# Patient Record
Sex: MALE | Race: BLACK OR AFRICAN AMERICAN | NOT HISPANIC OR LATINO | Employment: UNEMPLOYED | URBAN - METROPOLITAN AREA
[De-identification: names, ages, dates, MRNs, and addresses within clinical notes are randomized per-mention and may not be internally consistent; named-entity substitution may affect disease eponyms.]

---

## 2017-05-25 ENCOUNTER — HISTORICAL (OUTPATIENT)
Dept: ADMINISTRATIVE | Facility: HOSPITAL | Age: 47
End: 2017-05-25

## 2017-05-25 LAB
ABS NEUT (OLG): 1.77 X10(3)/MCL (ref 2.1–9.2)
ALBUMIN SERPL-MCNC: 4 GM/DL (ref 3.4–5)
ALBUMIN/GLOB SERPL: 1 RATIO (ref 1–2)
ALP SERPL-CCNC: 62 UNIT/L (ref 20–120)
ALT SERPL-CCNC: 43 UNIT/L
AST SERPL-CCNC: 37 UNIT/L
BASOPHILS # BLD AUTO: 0.01 X10(3)/MCL
BASOPHILS NFR BLD AUTO: 0 % (ref 0–1)
BILIRUB SERPL-MCNC: 0.3 MG/DL
BILIRUBIN DIRECT+TOT PNL SERPL-MCNC: <0.1 MG/DL
BILIRUBIN DIRECT+TOT PNL SERPL-MCNC: >0.2 MG/DL
BUN SERPL-MCNC: 11 MG/DL (ref 7–25)
CALCIUM SERPL-MCNC: 9.1 MG/DL (ref 8.4–10.3)
CD3+CD4+ CELLS # SPEC: 940 UNIT/L (ref 589–1505)
CD3+CD4+ CELLS NFR BLD: 45.4 % (ref 31–59)
CHLORIDE SERPL-SCNC: 104 MMOL/L (ref 96–110)
CHOLEST SERPL-MCNC: 197 MG/DL
CHOLEST/HDLC SERPL: 2.6 {RATIO} (ref 0–5)
CO2 SERPL-SCNC: 28 MMOL/L (ref 24–32)
CREAT SERPL-MCNC: 0.73 MG/DL (ref 0.7–1.4)
EOSINOPHIL # BLD AUTO: 0.11 10*3/UL
EOSINOPHIL NFR BLD AUTO: 2 % (ref 0–5)
ERYTHROCYTE [DISTWIDTH] IN BLOOD BY AUTOMATED COUNT: 11.9 % (ref 11.5–14.5)
GLOBULIN SER-MCNC: 4.4 GM/ML (ref 2.3–3.5)
GLUCOSE SERPL-MCNC: 95 MG/DL (ref 65–99)
HCT VFR BLD AUTO: 43 % (ref 40–51)
HDLC SERPL-MCNC: 76 MG/DL
HGB BLD-MCNC: 15 GM/DL (ref 13.5–17.5)
IMM GRANULOCYTES # BLD AUTO: 0.01 10*3/UL
IMM GRANULOCYTES NFR BLD AUTO: 0 %
LDLC SERPL CALC-MCNC: 107 MG/DL (ref 0–130)
LYMPHOCYTES # BLD AUTO: 2.04 X10(3)/MCL
LYMPHOCYTES # BLD AUTO: 2070 UNIT/L (ref 1260–5520)
LYMPHOCYTES NFR BLD AUTO: 46 % (ref 15–40)
LYMPHOCYTES NFR LN MANUAL: 46 % (ref 28–48)
LYMPHOMA - T-CELL MARKERS SPEC-IMP: NORMAL
MCH RBC QN AUTO: 36.7 PG (ref 26–34)
MCHC RBC AUTO-ENTMCNC: 34.9 GM/DL (ref 31–37)
MCV RBC AUTO: 105.1 FL (ref 80–100)
MONOCYTES # BLD AUTO: 0.52 X10(3)/MCL
MONOCYTES NFR BLD AUTO: 12 % (ref 4–12)
NEUTROPHILS # BLD AUTO: 1.77 X10(3)/MCL
NEUTROPHILS NFR BLD AUTO: 40 X10(3)/MCL
PLATELET # BLD AUTO: 186 X10(3)/MCL (ref 130–400)
PMV BLD AUTO: 10.4 FL (ref 7.4–10.4)
POTASSIUM SERPL-SCNC: 4.3 MMOL/L (ref 3.6–5.2)
PROT SERPL-MCNC: 8.4 GM/DL (ref 6–8)
RBC # BLD AUTO: 4.09 X10(6)/MCL (ref 4.5–5.9)
RPR SER QL: REACTIVE
SODIUM SERPL-SCNC: 136 MMOL/L (ref 135–146)
TRIGL SERPL-MCNC: 70 MG/DL
VLDLC SERPL CALC-MCNC: 14 MG/DL
WBC # BLD AUTO: 4500 /MM3 (ref 4500–11500)
WBC # SPEC AUTO: 4.5 X10(3)/MCL (ref 4.5–11)

## 2017-08-22 ENCOUNTER — HISTORICAL (OUTPATIENT)
Dept: ADMINISTRATIVE | Facility: HOSPITAL | Age: 47
End: 2017-08-22

## 2018-02-01 ENCOUNTER — HISTORICAL (OUTPATIENT)
Dept: INTERNAL MEDICINE | Facility: CLINIC | Age: 48
End: 2018-02-01

## 2018-02-01 LAB
ABS NEUT (OLG): 1.25 X10(3)/MCL (ref 2.1–9.2)
ALBUMIN SERPL-MCNC: 3.4 GM/DL (ref 3.4–5)
ALBUMIN/GLOB SERPL: 1 RATIO (ref 1–2)
ALP SERPL-CCNC: 60 UNIT/L (ref 45–117)
ALT SERPL-CCNC: 29 UNIT/L (ref 12–78)
AMPHET UR QL SCN: NEGATIVE
AST SERPL-CCNC: 24 UNIT/L (ref 15–37)
BARBITURATE SCN PRESENT UR: NEGATIVE
BASOPHILS # BLD AUTO: 0.03 X10(3)/MCL
BASOPHILS NFR BLD AUTO: 1 % (ref 0–1)
BENZODIAZ UR QL SCN: NEGATIVE
BILIRUB SERPL-MCNC: 0.4 MG/DL (ref 0.2–1)
BILIRUBIN DIRECT+TOT PNL SERPL-MCNC: 0.1 MG/DL
BILIRUBIN DIRECT+TOT PNL SERPL-MCNC: 0.3 MG/DL
BUN SERPL-MCNC: 12 MG/DL (ref 7–18)
CALCIUM SERPL-MCNC: 8.4 MG/DL (ref 8.5–10.1)
CANNABINOIDS UR QL SCN: NEGATIVE
CHLORIDE SERPL-SCNC: 109 MMOL/L (ref 98–107)
CO2 SERPL-SCNC: 22 MMOL/L (ref 21–32)
COCAINE UR QL SCN: NEGATIVE
CREAT SERPL-MCNC: 0.8 MG/DL (ref 0.6–1.3)
EOSINOPHIL # BLD AUTO: 0.17 X10(3)/MCL
EOSINOPHIL NFR BLD AUTO: 5 % (ref 0–5)
ERYTHROCYTE [DISTWIDTH] IN BLOOD BY AUTOMATED COUNT: 11.1 % (ref 11.5–14.5)
ETHANOL SERPL-MCNC: <3 MG/DL
FERRITIN SERPL-MCNC: 185.2 NG/ML (ref 26–388)
GLOBULIN SER-MCNC: 4.8 GM/ML (ref 2.3–3.5)
GLUCOSE SERPL-MCNC: 91 MG/DL (ref 74–106)
HAV AB SER QL IA: REACTIVE
HBV CORE AB SERPL QL IA: NONREACTIVE
HBV SURFACE AB SER-ACNC: 26.56 MIU/ML
HBV SURFACE AB SERPL IA-ACNC: REACTIVE M[IU]/ML
HBV SURFACE AG SERPL QL IA: NEGATIVE
HCT VFR BLD AUTO: 42.5 % (ref 40–51)
HGB BLD-MCNC: 15.1 GM/DL (ref 13.5–17.5)
HIV 1+2 AB+HIV1 P24 AG SERPL QL IA: REACTIVE
INR PPP: 0.99 (ref 0.9–1.2)
IRON SATN MFR SERPL: 37.9 % (ref 15–50)
IRON SERPL-MCNC: 111 MCG/DL (ref 65–175)
LYMPHOCYTES # BLD AUTO: 1.73 X10(3)/MCL
LYMPHOCYTES NFR BLD AUTO: 48 % (ref 15–40)
MCH RBC QN AUTO: 36 PG (ref 26–34)
MCHC RBC AUTO-ENTMCNC: 35.5 GM/DL (ref 31–37)
MCV RBC AUTO: 101.4 FL (ref 80–100)
MONOCYTES # BLD AUTO: 0.46 X10(3)/MCL
MONOCYTES NFR BLD AUTO: 13 % (ref 4–12)
NEUTROPHILS # BLD AUTO: 1.25 X10(3)/MCL
NEUTROPHILS NFR BLD AUTO: 34 X10(3)/MCL
OPIATES UR QL SCN: NEGATIVE
PCP UR QL: NEGATIVE
PH UR STRIP.AUTO: 6 [PH] (ref 5–8)
PLATELET # BLD AUTO: 203 X10(3)/MCL (ref 130–400)
PMV BLD AUTO: 10.6 FL (ref 7.4–10.4)
POTASSIUM SERPL-SCNC: 3.9 MMOL/L (ref 3.5–5.1)
PROT SERPL-MCNC: 8.2 GM/DL (ref 6.4–8.2)
PROTHROMBIN TIME: 12.4 SECOND(S) (ref 11.9–14.4)
RBC # BLD AUTO: 4.19 X10(6)/MCL (ref 4.5–5.9)
SODIUM SERPL-SCNC: 138 MMOL/L (ref 136–145)
TEMPERATURE, URINE (OHS): 24.1 DEGC (ref 20–25)
TIBC SERPL-MCNC: 293 MCG/DL (ref 250–450)
TRANSFERRIN SERPL-MCNC: 245 MG/DL (ref 200–360)
WBC # SPEC AUTO: 3.6 X10(3)/MCL (ref 4.5–11)

## 2018-05-17 ENCOUNTER — HISTORICAL (OUTPATIENT)
Dept: ADMINISTRATIVE | Facility: HOSPITAL | Age: 48
End: 2018-05-17

## 2018-05-17 LAB
ABS NEUT (OLG): 1.59 X10(3)/MCL (ref 2.1–9.2)
ALBUMIN SERPL-MCNC: 3.6 GM/DL (ref 3.4–5)
ALBUMIN/GLOB SERPL: 1 RATIO (ref 1–2)
ALP SERPL-CCNC: 63 UNIT/L (ref 45–117)
ALT SERPL-CCNC: 46 UNIT/L (ref 12–78)
AST SERPL-CCNC: 31 UNIT/L (ref 15–37)
BASOPHILS # BLD AUTO: 0.02 X10(3)/MCL
BASOPHILS NFR BLD AUTO: 0 %
BILIRUB SERPL-MCNC: 0.4 MG/DL (ref 0.2–1)
BILIRUBIN DIRECT+TOT PNL SERPL-MCNC: 0.1 MG/DL
BILIRUBIN DIRECT+TOT PNL SERPL-MCNC: 0.3 MG/DL
BUN SERPL-MCNC: 10 MG/DL (ref 7–18)
CALCIUM SERPL-MCNC: 8.6 MG/DL (ref 8.5–10.1)
CD3+CD4+ CELLS # SPEC: 891 UNIT/L (ref 589–1505)
CD3+CD4+ CELLS NFR BLD: 53 % (ref 31–59)
CHLORIDE SERPL-SCNC: 107 MMOL/L (ref 98–107)
CHOLEST SERPL-MCNC: 166 MG/DL
CHOLEST/HDLC SERPL: 2.4 {RATIO} (ref 0–5)
CO2 SERPL-SCNC: 24 MMOL/L (ref 21–32)
CREAT SERPL-MCNC: 0.9 MG/DL (ref 0.6–1.3)
DEPRECATED CALCIDIOL+CALCIFEROL SERPL-MC: 18.77 NG/ML (ref 30–80)
EOSINOPHIL # BLD AUTO: 0.15 10*3/UL
EOSINOPHIL NFR BLD AUTO: 4 %
ERYTHROCYTE [DISTWIDTH] IN BLOOD BY AUTOMATED COUNT: 12.4 % (ref 11.5–14.5)
GLOBULIN SER-MCNC: 4.9 GM/ML (ref 2.3–3.5)
GLUCOSE SERPL-MCNC: 73 MG/DL (ref 74–106)
HCT VFR BLD AUTO: 44 % (ref 40–51)
HDLC SERPL-MCNC: 70 MG/DL
HGB BLD-MCNC: 15.2 GM/DL (ref 13.5–17.5)
IMM GRANULOCYTES # BLD AUTO: 0.02 10*3/UL
IMM GRANULOCYTES NFR BLD AUTO: 0 %
LDLC SERPL CALC-MCNC: 81 MG/DL (ref 0–130)
LYMPHOCYTES # BLD AUTO: 1.68 X10(3)/MCL
LYMPHOCYTES # BLD AUTO: 1681 UNIT/L (ref 1260–5520)
LYMPHOCYTES NFR BLD AUTO: 41 % (ref 13–40)
LYMPHOCYTES NFR LN MANUAL: 41 % (ref 28–48)
LYMPHOMA - T-CELL MARKERS SPEC-IMP: ABNORMAL
MCH RBC QN AUTO: 35.8 PG (ref 26–34)
MCHC RBC AUTO-ENTMCNC: 34.5 GM/DL (ref 31–37)
MCV RBC AUTO: 103.8 FL (ref 80–100)
MONOCYTES # BLD AUTO: 0.61 X10(3)/MCL
MONOCYTES NFR BLD AUTO: 15 % (ref 4–12)
NEG CONT SPOT COUNT: NORMAL
NEUTROPHILS # BLD AUTO: 1.59 X10(3)/MCL
NEUTROPHILS NFR BLD AUTO: 39 X10(3)/MCL
PANEL A SPOT COUNT: 0
PANEL B SPOT COUNT: 0
PLATELET # BLD AUTO: 225 X10(3)/MCL (ref 130–400)
PMV BLD AUTO: 11.1 FL (ref 7.4–10.4)
POS CONT SPOT COUNT: NORMAL
POTASSIUM SERPL-SCNC: 3.8 MMOL/L (ref 3.5–5.1)
PROT SERPL-MCNC: 8.5 GM/DL (ref 6.4–8.2)
RBC # BLD AUTO: 4.24 X10(6)/MCL (ref 4.5–5.9)
RPR SER QL: REACTIVE
SODIUM SERPL-SCNC: 137 MMOL/L (ref 136–145)
T PALLIDUM AB SER QL: REACTIVE
T-SPOT.TB: NEGATIVE
TRIGL SERPL-MCNC: 75 MG/DL
TSH SERPL-ACNC: 1.36 MIU/L (ref 0.36–3.74)
VLDLC SERPL CALC-MCNC: 15 MG/DL
WBC # BLD AUTO: 4100 /MM3 (ref 4500–11500)
WBC # SPEC AUTO: 4.1 X10(3)/MCL (ref 4.5–11)

## 2018-07-24 ENCOUNTER — HISTORICAL (OUTPATIENT)
Dept: ADMINISTRATIVE | Facility: HOSPITAL | Age: 48
End: 2018-07-24

## 2018-07-24 LAB
ABS NEUT (OLG): 1.75 X10(3)/MCL (ref 2.1–9.2)
ALBUMIN SERPL-MCNC: 3.5 GM/DL (ref 3.4–5)
ALBUMIN/GLOB SERPL: 1 RATIO (ref 1–2)
ALP SERPL-CCNC: 68 UNIT/L (ref 45–117)
ALT SERPL-CCNC: 25 UNIT/L (ref 12–78)
AMPHET UR QL SCN: NEGATIVE
AST SERPL-CCNC: 24 UNIT/L (ref 15–37)
BARBITURATE SCN PRESENT UR: NEGATIVE
BASOPHILS # BLD AUTO: 0.03 X10(3)/MCL
BASOPHILS NFR BLD AUTO: 1 %
BENZODIAZ UR QL SCN: NEGATIVE
BILIRUB SERPL-MCNC: 0.4 MG/DL (ref 0.2–1)
BILIRUBIN DIRECT+TOT PNL SERPL-MCNC: 0.1 MG/DL
BILIRUBIN DIRECT+TOT PNL SERPL-MCNC: 0.3 MG/DL
BUN SERPL-MCNC: 8 MG/DL (ref 7–18)
CALCIUM SERPL-MCNC: 8.5 MG/DL (ref 8.5–10.1)
CANNABINOIDS UR QL SCN: NEGATIVE
CHLORIDE SERPL-SCNC: 107 MMOL/L (ref 98–107)
CO2 SERPL-SCNC: 24 MMOL/L (ref 21–32)
COCAINE UR QL SCN: POSITIVE
CREAT SERPL-MCNC: 0.9 MG/DL (ref 0.6–1.3)
EOSINOPHIL # BLD AUTO: 0.22 X10(3)/MCL
EOSINOPHIL NFR BLD AUTO: 5 %
ERYTHROCYTE [DISTWIDTH] IN BLOOD BY AUTOMATED COUNT: 11.2 % (ref 11.5–14.5)
ETHANOL SERPL-MCNC: <3 MG/DL
GLOBULIN SER-MCNC: 4.8 GM/ML (ref 2.3–3.5)
GLUCOSE SERPL-MCNC: 87 MG/DL (ref 74–106)
HCT VFR BLD AUTO: 43.9 % (ref 40–51)
HGB BLD-MCNC: 15.3 GM/DL (ref 13.5–17.5)
IMM GRANULOCYTES # BLD AUTO: 0.01 10*3/UL
IMM GRANULOCYTES NFR BLD AUTO: 0 %
LYMPHOCYTES # BLD AUTO: 1.87 X10(3)/MCL
LYMPHOCYTES NFR BLD AUTO: 42 % (ref 13–40)
MCH RBC QN AUTO: 35 PG (ref 26–34)
MCHC RBC AUTO-ENTMCNC: 34.9 GM/DL (ref 31–37)
MCV RBC AUTO: 100.5 FL (ref 80–100)
MONOCYTES # BLD AUTO: 0.53 X10(3)/MCL
MONOCYTES NFR BLD AUTO: 12 % (ref 4–12)
NEUTROPHILS # BLD AUTO: 1.75 X10(3)/MCL
NEUTROPHILS NFR BLD AUTO: 40 X10(3)/MCL
OPIATES UR QL SCN: NEGATIVE
PCP UR QL: NEGATIVE
PH UR STRIP.AUTO: 5.5 [PH] (ref 5–8)
PLATELET # BLD AUTO: 229 X10(3)/MCL (ref 130–400)
PMV BLD AUTO: 10 FL (ref 7.4–10.4)
POTASSIUM SERPL-SCNC: 3.9 MMOL/L (ref 3.5–5.1)
PROT SERPL-MCNC: 8.3 GM/DL (ref 6.4–8.2)
RBC # BLD AUTO: 4.37 X10(6)/MCL (ref 4.5–5.9)
SODIUM SERPL-SCNC: 138 MMOL/L (ref 136–145)
TEMPERATURE, URINE (OHS): 25 DEGC (ref 20–25)
WBC # SPEC AUTO: 4.4 X10(3)/MCL (ref 4.5–11)

## 2018-10-23 ENCOUNTER — HISTORICAL (OUTPATIENT)
Dept: ADMINISTRATIVE | Facility: HOSPITAL | Age: 48
End: 2018-10-23

## 2018-10-23 LAB
ABS NEUT (OLG): 2.23 X10(3)/MCL (ref 2.1–9.2)
ALBUMIN SERPL-MCNC: 3.3 GM/DL (ref 3.4–5)
ALBUMIN/GLOB SERPL: 1 RATIO (ref 1–2)
ALP SERPL-CCNC: 73 UNIT/L (ref 45–117)
ALT SERPL-CCNC: 42 UNIT/L (ref 12–78)
AST SERPL-CCNC: 41 UNIT/L (ref 15–37)
BASOPHILS # BLD AUTO: 0.04 X10(3)/MCL
BASOPHILS NFR BLD AUTO: 1 %
BILIRUB SERPL-MCNC: 0.4 MG/DL (ref 0.2–1)
BILIRUBIN DIRECT+TOT PNL SERPL-MCNC: <0.1 MG/DL
BILIRUBIN DIRECT+TOT PNL SERPL-MCNC: ABNORMAL MG/DL
BUN SERPL-MCNC: 11 MG/DL (ref 7–18)
CALCIUM SERPL-MCNC: 8.6 MG/DL (ref 8.5–10.1)
CD3+CD4+ CELLS # SPEC: 886 UNIT/L (ref 589–1505)
CD3+CD4+ CELLS NFR BLD: 50.1 % (ref 31–59)
CHLORIDE SERPL-SCNC: 106 MMOL/L (ref 98–107)
CO2 SERPL-SCNC: 26 MMOL/L (ref 21–32)
CREAT SERPL-MCNC: 0.9 MG/DL (ref 0.6–1.3)
DEPRECATED CALCIDIOL+CALCIFEROL SERPL-MC: 19.78 NG/ML (ref 30–80)
EOSINOPHIL # BLD AUTO: 0.56 X10(3)/MCL
EOSINOPHIL NFR BLD AUTO: 11 %
ERYTHROCYTE [DISTWIDTH] IN BLOOD BY AUTOMATED COUNT: 12.8 % (ref 11.5–14.5)
EST. AVERAGE GLUCOSE BLD GHB EST-MCNC: 108 MG/DL
GLOBULIN SER-MCNC: 4.8 GM/ML (ref 2.3–3.5)
GLUCOSE SERPL-MCNC: 87 MG/DL (ref 74–106)
HBA1C MFR BLD: 5.4 % (ref 4.2–6.3)
HCT VFR BLD AUTO: 43.4 % (ref 40–51)
HGB BLD-MCNC: 14.8 GM/DL (ref 13.5–17.5)
IMM GRANULOCYTES # BLD AUTO: 0.01 10*3/UL
IMM GRANULOCYTES NFR BLD AUTO: 0 %
LYMPHOCYTES # BLD AUTO: 1.78 X10(3)/MCL
LYMPHOCYTES # BLD AUTO: 1768 UNIT/L (ref 1260–5520)
LYMPHOCYTES NFR BLD AUTO: 34 % (ref 13–40)
LYMPHOCYTES NFR LN MANUAL: 34 % (ref 28–48)
LYMPHOMA - T-CELL MARKERS SPEC-IMP: NORMAL
MCH RBC QN AUTO: 34 PG (ref 26–34)
MCHC RBC AUTO-ENTMCNC: 34.1 GM/DL (ref 31–37)
MCV RBC AUTO: 99.8 FL (ref 80–100)
MONOCYTES # BLD AUTO: 0.61 X10(3)/MCL
MONOCYTES NFR BLD AUTO: 12 % (ref 4–12)
NEUTROPHILS # BLD AUTO: 2.23 X10(3)/MCL
NEUTROPHILS NFR BLD AUTO: 43 X10(3)/MCL
PLATELET # BLD AUTO: 210 X10(3)/MCL (ref 130–400)
PMV BLD AUTO: 10.5 FL (ref 7.4–10.4)
POTASSIUM SERPL-SCNC: 3.9 MMOL/L (ref 3.5–5.1)
PROT SERPL-MCNC: 8.1 GM/DL (ref 6.4–8.2)
RBC # BLD AUTO: 4.35 X10(6)/MCL (ref 4.5–5.9)
RPR SER QL: REACTIVE
SODIUM SERPL-SCNC: 138 MMOL/L (ref 136–145)
T PALLIDUM AB SER QL: REACTIVE
TSH SERPL-ACNC: 1.43 MIU/L (ref 0.36–3.74)
WBC # BLD AUTO: 5200 /MM3 (ref 4500–11500)
WBC # SPEC AUTO: 5.2 X10(3)/MCL (ref 4.5–11)

## 2019-02-07 ENCOUNTER — HISTORICAL (OUTPATIENT)
Dept: ADMINISTRATIVE | Facility: HOSPITAL | Age: 49
End: 2019-02-07

## 2019-02-07 LAB
ALBUMIN SERPL-MCNC: 3.5 GM/DL (ref 3.4–5)
ALBUMIN/GLOB SERPL: 0.7 RATIO (ref 1.1–2)
ALP SERPL-CCNC: 72 UNIT/L (ref 45–117)
ALT SERPL-CCNC: 39 UNIT/L (ref 12–78)
AST SERPL-CCNC: 41 UNIT/L (ref 15–37)
BILIRUB SERPL-MCNC: 0.4 MG/DL (ref 0.2–1)
BILIRUBIN DIRECT+TOT PNL SERPL-MCNC: 0.1 MG/DL
BILIRUBIN DIRECT+TOT PNL SERPL-MCNC: 0.3 MG/DL
BUN SERPL-MCNC: 8 MG/DL (ref 7–18)
CALCIUM SERPL-MCNC: 8.7 MG/DL (ref 8.5–10.1)
CHLORIDE SERPL-SCNC: 106 MMOL/L (ref 98–107)
CO2 SERPL-SCNC: 25 MMOL/L (ref 21–32)
CREAT SERPL-MCNC: 0.9 MG/DL (ref 0.6–1.3)
ERYTHROCYTE [DISTWIDTH] IN BLOOD BY AUTOMATED COUNT: 12.1 % (ref 11.5–14.5)
GLOBULIN SER-MCNC: 5 GM/ML (ref 2.3–3.5)
GLUCOSE SERPL-MCNC: 88 MG/DL (ref 74–106)
HCT VFR BLD AUTO: 41.8 % (ref 40–51)
HGB BLD-MCNC: 14.3 GM/DL (ref 13.5–17.5)
MCH RBC QN AUTO: 34.8 PG (ref 26–34)
MCHC RBC AUTO-ENTMCNC: 34.2 GM/DL (ref 31–37)
MCV RBC AUTO: 101.7 FL (ref 80–100)
PLATELET # BLD AUTO: 213 X10(3)/MCL (ref 130–400)
PMV BLD AUTO: 10.3 FL (ref 7.4–10.4)
POTASSIUM SERPL-SCNC: 3.8 MMOL/L (ref 3.5–5.1)
PROT SERPL-MCNC: 8.5 GM/DL (ref 6.4–8.2)
RBC # BLD AUTO: 4.11 X10(6)/MCL (ref 4.5–5.9)
SODIUM SERPL-SCNC: 136 MMOL/L (ref 136–145)
WBC # SPEC AUTO: 5.3 X10(3)/MCL (ref 4.5–11)

## 2019-02-12 ENCOUNTER — HISTORICAL (OUTPATIENT)
Dept: SURGERY | Facility: HOSPITAL | Age: 49
End: 2019-02-12

## 2019-02-12 LAB
AMPHET UR QL SCN: NEGATIVE
BARBITURATE SCN PRESENT UR: NEGATIVE
BENZODIAZ UR QL SCN: NEGATIVE
CANNABINOIDS UR QL SCN: NEGATIVE
COCAINE UR QL SCN: POSITIVE
OPIATES UR QL SCN: NEGATIVE
PCP UR QL: NEGATIVE
PH UR STRIP.AUTO: 6 [PH] (ref 5–8)
TEMPERATURE, URINE (OHS): 20.2 DEGC (ref 20–25)

## 2019-05-02 ENCOUNTER — HISTORICAL (OUTPATIENT)
Dept: ADMINISTRATIVE | Facility: HOSPITAL | Age: 49
End: 2019-05-02

## 2019-05-02 LAB
ABS NEUT (OLG): 1.7 X10(3)/MCL (ref 2.1–9.2)
ALBUMIN SERPL-MCNC: 3.5 GM/DL (ref 3.4–5)
ALBUMIN/GLOB SERPL: 0.6 RATIO (ref 1.1–2)
ALP SERPL-CCNC: 64 UNIT/L (ref 45–117)
ALT SERPL-CCNC: 51 UNIT/L (ref 12–78)
AST SERPL-CCNC: 63 UNIT/L (ref 15–37)
BASOPHILS # BLD AUTO: 0.02 X10(3)/MCL
BASOPHILS NFR BLD AUTO: 0 %
BILIRUB SERPL-MCNC: 0.4 MG/DL (ref 0.2–1)
BILIRUBIN DIRECT+TOT PNL SERPL-MCNC: 0.2 MG/DL
BILIRUBIN DIRECT+TOT PNL SERPL-MCNC: 0.2 MG/DL
BUN SERPL-MCNC: 9 MG/DL (ref 7–18)
CALCIUM SERPL-MCNC: 9.1 MG/DL (ref 8.5–10.1)
CD3+CD4+ CELLS # SPEC: 721 UNIT/L (ref 589–1505)
CD3+CD4+ CELLS NFR BLD: 46.3 % (ref 31–59)
CHLORIDE SERPL-SCNC: 107 MMOL/L (ref 98–107)
CHOLEST SERPL-MCNC: 184 MG/DL
CHOLEST/HDLC SERPL: 2.5 {RATIO} (ref 0–5)
CO2 SERPL-SCNC: 25 MMOL/L (ref 21–32)
CREAT SERPL-MCNC: 0.7 MG/DL (ref 0.6–1.3)
DEPRECATED CALCIDIOL+CALCIFEROL SERPL-MC: 15.47 NG/ML (ref 30–80)
EOSINOPHIL # BLD AUTO: 0.25 10*3/UL
EOSINOPHIL NFR BLD AUTO: 6 %
ERYTHROCYTE [DISTWIDTH] IN BLOOD BY AUTOMATED COUNT: 12 % (ref 11.5–14.5)
GLOBULIN SER-MCNC: 5.5 GM/ML (ref 2.3–3.5)
GLUCOSE SERPL-MCNC: 89 MG/DL (ref 74–106)
HCT VFR BLD AUTO: 43.7 % (ref 40–51)
HDLC SERPL-MCNC: 75 MG/DL
HGB BLD-MCNC: 14.8 GM/DL (ref 13.5–17.5)
IMM GRANULOCYTES # BLD AUTO: 0.01 10*3/UL
IMM GRANULOCYTES NFR BLD AUTO: 0 %
LDLC SERPL CALC-MCNC: 94 MG/DL (ref 0–130)
LYMPHOCYTES # BLD AUTO: 1.58 X10(3)/MCL
LYMPHOCYTES # BLD AUTO: 1558 UNIT/L (ref 1260–5520)
LYMPHOCYTES NFR BLD AUTO: 38 % (ref 13–40)
LYMPHOCYTES NFR LN MANUAL: 38 % (ref 28–48)
LYMPHOMA - T-CELL MARKERS SPEC-IMP: ABNORMAL
MCH RBC QN AUTO: 35.3 PG (ref 26–34)
MCHC RBC AUTO-ENTMCNC: 33.9 GM/DL (ref 31–37)
MCV RBC AUTO: 104.3 FL (ref 80–100)
MONOCYTES # BLD AUTO: 0.55 X10(3)/MCL
MONOCYTES NFR BLD AUTO: 13 % (ref 4–12)
NEG CONT SPOT COUNT: NORMAL
NEUTROPHILS # BLD AUTO: 1.7 X10(3)/MCL
NEUTROPHILS NFR BLD AUTO: 41 X10(3)/MCL
PANEL A SPOT COUNT: 0
PANEL B SPOT COUNT: 0
PLATELET # BLD AUTO: 219 X10(3)/MCL (ref 130–400)
PMV BLD AUTO: 10.6 FL (ref 7.4–10.4)
POS CONT SPOT COUNT: NORMAL
POTASSIUM SERPL-SCNC: 3.8 MMOL/L (ref 3.5–5.1)
PROT SERPL-MCNC: 9 GM/DL (ref 6.4–8.2)
RBC # BLD AUTO: 4.19 X10(6)/MCL (ref 4.5–5.9)
RPR SER QL: REACTIVE
SODIUM SERPL-SCNC: 139 MMOL/L (ref 136–145)
T PALLIDUM AB SER QL: REACTIVE
T-SPOT.TB: NEGATIVE
TRIGL SERPL-MCNC: 76 MG/DL
VLDLC SERPL CALC-MCNC: 15 MG/DL
WBC # BLD AUTO: 4100 /MM3 (ref 4500–11500)
WBC # SPEC AUTO: 4.1 X10(3)/MCL (ref 4.5–11)

## 2019-05-15 ENCOUNTER — HISTORICAL (OUTPATIENT)
Dept: ADMINISTRATIVE | Facility: HOSPITAL | Age: 49
End: 2019-05-15

## 2019-05-15 LAB
AMPHET UR QL SCN: NEGATIVE
APPEARANCE, UA: CLEAR
BACTERIA #/AREA URNS AUTO: NORMAL /[HPF]
BARBITURATE SCN PRESENT UR: NEGATIVE
BENZODIAZ UR QL SCN: NEGATIVE
BILIRUB UR QL STRIP: NEGATIVE
CANNABINOIDS UR QL SCN: NEGATIVE
COCAINE UR QL SCN: POSITIVE
COLOR UR: YELLOW
GLUCOSE (UA): NORMAL
HGB UR QL STRIP: NEGATIVE
HYALINE CASTS #/AREA URNS LPF: 0 /[LPF]
KETONES UR QL STRIP: NEGATIVE
LEUKOCYTE ESTERASE UR QL STRIP: NEGATIVE
NITRITE UR QL STRIP: NEGATIVE
OPIATES UR QL SCN: NEGATIVE
PCP UR QL: NEGATIVE
PH UR STRIP.AUTO: 7 [PH] (ref 5–8)
PH UR STRIP: 7 [PH] (ref 4.5–8)
PROT UR QL STRIP: NEGATIVE
RBC #/AREA URNS AUTO: NORMAL /[HPF]
SP GR UR STRIP: 1.01 (ref 1–1.03)
SQUAMOUS #/AREA URNS LPF: NORMAL /[LPF]
TEMPERATURE, URINE (OHS): 25 DEGC (ref 20–25)
UROBILINOGEN UR STRIP-ACNC: NORMAL
WBC #/AREA URNS AUTO: NORMAL /HPF

## 2020-05-06 ENCOUNTER — HISTORICAL (OUTPATIENT)
Dept: ADMINISTRATIVE | Facility: HOSPITAL | Age: 50
End: 2020-05-06

## 2020-05-06 LAB
ABS NEUT (OLG): 1.54 X10(3)/MCL (ref 2.1–9.2)
ALBUMIN SERPL-MCNC: 3.4 GM/DL (ref 3.4–5)
ALBUMIN/GLOB SERPL: 0.6 RATIO (ref 1.1–2)
ALP SERPL-CCNC: 78 UNIT/L (ref 45–117)
ALT SERPL-CCNC: 30 UNIT/L (ref 12–78)
APPEARANCE, UA: CLEAR
AST SERPL-CCNC: 27 UNIT/L (ref 15–37)
BACTERIA #/AREA URNS AUTO: ABNORMAL /HPF
BASOPHILS # BLD AUTO: 0 X10(3)/MCL (ref 0–0.2)
BASOPHILS NFR BLD AUTO: 1 %
BILIRUB SERPL-MCNC: 0.4 MG/DL (ref 0.2–1)
BILIRUB UR QL STRIP: NEGATIVE
BILIRUBIN DIRECT+TOT PNL SERPL-MCNC: <0.1 MG/DL (ref 0–0.2)
BILIRUBIN DIRECT+TOT PNL SERPL-MCNC: ABNORMAL MG/DL
BUN SERPL-MCNC: 8 MG/DL (ref 7–18)
CALCIUM SERPL-MCNC: 8.8 MG/DL (ref 8.5–10.1)
CD3+CD4+ CELLS # SPEC: 910 UNIT/L (ref 589–1505)
CD3+CD4+ CELLS NFR BLD: 49.3 % (ref 31–59)
CHLORIDE SERPL-SCNC: 109 MMOL/L (ref 98–107)
CO2 SERPL-SCNC: 26 MMOL/L (ref 21–32)
COLOR UR: NORMAL
CREAT SERPL-MCNC: 0.9 MG/DL (ref 0.6–1.3)
DEPRECATED CALCIDIOL+CALCIFEROL SERPL-MC: 11.7 NG/ML (ref 30–80)
EOSINOPHIL # BLD AUTO: 0.2 X10(3)/MCL (ref 0–0.9)
EOSINOPHIL NFR BLD AUTO: 4 %
ERYTHROCYTE [DISTWIDTH] IN BLOOD BY AUTOMATED COUNT: 12.4 % (ref 11.5–14.5)
GLOBULIN SER-MCNC: 5.3 GM/ML (ref 2.3–3.5)
GLUCOSE (UA): NEGATIVE
GLUCOSE SERPL-MCNC: 88 MG/DL (ref 74–106)
HCT VFR BLD AUTO: 43.1 % (ref 40–51)
HGB BLD-MCNC: 14.5 GM/DL (ref 13.5–17.5)
HGB UR QL STRIP: NEGATIVE
HYALINE CASTS #/AREA URNS LPF: ABNORMAL /LPF
KETONES UR QL STRIP: NEGATIVE
LEUKOCYTE ESTERASE UR QL STRIP: NEGATIVE
LYMPHOCYTES # BLD AUTO: 1.9 X10(3)/MCL (ref 0.6–4.6)
LYMPHOCYTES # BLD AUTO: 1845 UNIT/L (ref 1260–5520)
LYMPHOCYTES NFR BLD AUTO: 45 %
LYMPHOCYTES NFR LN MANUAL: 45 % (ref 28–48)
LYMPHOMA - T-CELL MARKERS SPEC-IMP: ABNORMAL
MCH RBC QN AUTO: 34.5 PG (ref 26–34)
MCHC RBC AUTO-ENTMCNC: 33.6 GM/DL (ref 31–37)
MCV RBC AUTO: 102.6 FL (ref 80–100)
MONOCYTES # BLD AUTO: 0.5 X10(3)/MCL (ref 0.1–1.3)
MONOCYTES NFR BLD AUTO: 12 %
NEUTROPHILS # BLD AUTO: 1.54 X10(3)/MCL (ref 2.1–9.2)
NEUTROPHILS NFR BLD AUTO: 38 %
NITRITE UR QL STRIP: NEGATIVE
PH UR STRIP: 6 [PH] (ref 4.5–8)
PLATELET # BLD AUTO: 244 X10(3)/MCL (ref 130–400)
PMV BLD AUTO: 10 FL (ref 7.4–10.4)
POTASSIUM SERPL-SCNC: 4 MMOL/L (ref 3.5–5.1)
PROT SERPL-MCNC: 8.7 GM/DL (ref 6.4–8.2)
PROT UR QL STRIP: NEGATIVE
RBC # BLD AUTO: 4.2 X10(6)/MCL (ref 4.5–5.9)
RBC #/AREA URNS AUTO: ABNORMAL /HPF
SODIUM SERPL-SCNC: 138 MMOL/L (ref 136–145)
SP GR UR STRIP: 1.01 (ref 1–1.03)
SQUAMOUS #/AREA URNS LPF: ABNORMAL /LPF
UROBILINOGEN UR STRIP-ACNC: NORMAL
WBC # BLD AUTO: 4100 /MM3 (ref 4500–11500)
WBC # SPEC AUTO: 4.1 X10(3)/MCL (ref 4.5–11)
WBC #/AREA URNS AUTO: ABNORMAL /HPF

## 2020-11-12 ENCOUNTER — HISTORICAL (OUTPATIENT)
Dept: ADMINISTRATIVE | Facility: HOSPITAL | Age: 50
End: 2020-11-12

## 2020-11-12 LAB
ABS NEUT (OLG): 2.54 X10(3)/MCL (ref 2.1–9.2)
ALBUMIN SERPL-MCNC: 4.2 GM/DL (ref 3.5–5)
ALBUMIN/GLOB SERPL: 1.1 RATIO (ref 1.1–2)
ALP SERPL-CCNC: 62 UNIT/L (ref 40–150)
ALT SERPL-CCNC: 12 UNIT/L (ref 0–55)
APPEARANCE, UA: CLEAR
AST SERPL-CCNC: 18 UNIT/L (ref 5–34)
BACTERIA #/AREA URNS AUTO: ABNORMAL /HPF
BASOPHILS # BLD AUTO: 0 X10(3)/MCL (ref 0–0.2)
BASOPHILS NFR BLD AUTO: 0 %
BILIRUB SERPL-MCNC: 0.7 MG/DL
BILIRUB UR QL STRIP: NEGATIVE
BILIRUBIN DIRECT+TOT PNL SERPL-MCNC: 0.3 MG/DL (ref 0–0.5)
BILIRUBIN DIRECT+TOT PNL SERPL-MCNC: 0.4 MG/DL (ref 0–0.8)
BUN SERPL-MCNC: 10 MG/DL (ref 8.4–25.7)
CALCIUM SERPL-MCNC: 9.3 MG/DL (ref 8.4–10.2)
CD3+CD4+ CELLS # SPEC: 943 UNIT/L (ref 589–1505)
CD3+CD4+ CELLS NFR BLD: 53.1 % (ref 31–59)
CHLORIDE SERPL-SCNC: 105 MMOL/L (ref 98–107)
CHOLEST SERPL-MCNC: 180 MG/DL
CHOLEST/HDLC SERPL: 3 {RATIO} (ref 0–5)
CO2 SERPL-SCNC: 25 MMOL/L (ref 22–29)
COLOR UR: NORMAL
CREAT SERPL-MCNC: 0.82 MG/DL (ref 0.73–1.18)
DEPRECATED CALCIDIOL+CALCIFEROL SERPL-MC: 16.9 NG/ML (ref 30–80)
EOSINOPHIL # BLD AUTO: 0.1 X10(3)/MCL (ref 0–0.9)
EOSINOPHIL NFR BLD AUTO: 1 %
ERYTHROCYTE [DISTWIDTH] IN BLOOD BY AUTOMATED COUNT: 12.2 % (ref 11.5–14.5)
EST. AVERAGE GLUCOSE BLD GHB EST-MCNC: 99.7 MG/DL
GLOBULIN SER-MCNC: 3.8 GM/DL (ref 2.4–3.5)
GLUCOSE (UA): NEGATIVE
GLUCOSE SERPL-MCNC: 115 MG/DL (ref 74–100)
HAV AB SER QL IA: REACTIVE
HBA1C MFR BLD: 5.1 %
HBV SURFACE AB SER-ACNC: 31.99 M[IU]/ML
HBV SURFACE AB SERPL IA-ACNC: REACTIVE M[IU]/ML
HCT VFR BLD AUTO: 44.1 % (ref 40–51)
HDLC SERPL-MCNC: 66 MG/DL (ref 35–60)
HGB BLD-MCNC: 15.1 GM/DL (ref 13.5–17.5)
HGB UR QL STRIP: NEGATIVE
HIV 1+2 AB+HIV1 P24 AG SERPL QL IA: REACTIVE
HYALINE CASTS #/AREA URNS LPF: ABNORMAL /LPF
IMM GRANULOCYTES # BLD AUTO: 0.01 10*3/UL
IMM GRANULOCYTES NFR BLD AUTO: 0 %
KETONES UR QL STRIP: NEGATIVE
LDLC SERPL CALC-MCNC: 104 MG/DL (ref 50–140)
LEUKOCYTE ESTERASE UR QL STRIP: NEGATIVE
LYMPHOCYTES # BLD AUTO: 1.8 X10(3)/MCL (ref 0.6–4.6)
LYMPHOCYTES # BLD AUTO: 1776 UNIT/L (ref 1260–5520)
LYMPHOCYTES NFR BLD AUTO: 37 %
LYMPHOCYTES NFR LN MANUAL: 37 % (ref 28–48)
LYMPHOMA - T-CELL MARKERS SPEC-IMP: NORMAL
MCH RBC QN AUTO: 35 PG (ref 26–34)
MCHC RBC AUTO-ENTMCNC: 34.2 GM/DL (ref 31–37)
MCV RBC AUTO: 102.3 FL (ref 80–100)
MONOCYTES # BLD AUTO: 0.4 X10(3)/MCL (ref 0.1–1.3)
MONOCYTES NFR BLD AUTO: 9 %
NEG CONT SPOT COUNT: NORMAL
NEUTROPHILS # BLD AUTO: 2.54 X10(3)/MCL (ref 2.1–9.2)
NEUTROPHILS NFR BLD AUTO: 52 %
NITRITE UR QL STRIP: NEGATIVE
PANEL A SPOT COUNT: 0
PANEL B SPOT COUNT: 0
PH UR STRIP: 6 [PH] (ref 4.5–8)
PLATELET # BLD AUTO: 222 X10(3)/MCL (ref 130–400)
PMV BLD AUTO: 10.1 FL (ref 7.4–10.4)
POS CONT SPOT COUNT: NORMAL
POTASSIUM SERPL-SCNC: 4.3 MMOL/L (ref 3.5–5.1)
PROT SERPL-MCNC: 8 GM/DL (ref 6.4–8.3)
PROT UR QL STRIP: NEGATIVE
RBC # BLD AUTO: 4.31 X10(6)/MCL (ref 4.5–5.9)
RBC #/AREA URNS AUTO: ABNORMAL /HPF
RPR SER QL: REACTIVE
SODIUM SERPL-SCNC: 136 MMOL/L (ref 136–145)
SP GR UR STRIP: 1.02 (ref 1–1.03)
SQUAMOUS #/AREA URNS LPF: ABNORMAL /LPF
T PALLIDUM AB SER QL: REACTIVE
T-SPOT.TB: NORMAL
TRIGL SERPL-MCNC: 50 MG/DL (ref 34–140)
UROBILINOGEN UR STRIP-ACNC: NORMAL
VLDLC SERPL CALC-MCNC: 10 MG/DL
WBC # BLD AUTO: 4800 /MM3 (ref 4500–11500)
WBC # SPEC AUTO: 4.8 X10(3)/MCL (ref 4.5–11)
WBC #/AREA URNS AUTO: ABNORMAL /HPF

## 2021-04-29 ENCOUNTER — HISTORICAL (OUTPATIENT)
Dept: ADMINISTRATIVE | Facility: HOSPITAL | Age: 51
End: 2021-04-29

## 2021-04-29 LAB
ABS NEUT (OLG): 2.57 X10(3)/MCL (ref 2.1–9.2)
ALBUMIN SERPL-MCNC: 3.8 GM/DL (ref 3.5–5)
ALBUMIN/GLOB SERPL: 0.9 RATIO (ref 1.1–2)
ALP SERPL-CCNC: 70 UNIT/L (ref 40–150)
ALT SERPL-CCNC: 33 UNIT/L (ref 0–55)
APPEARANCE, UA: CLEAR
AST SERPL-CCNC: 30 UNIT/L (ref 5–34)
BACTERIA #/AREA URNS AUTO: ABNORMAL /HPF
BASOPHILS # BLD AUTO: 0 X10(3)/MCL (ref 0–0.2)
BASOPHILS NFR BLD AUTO: 0 %
BILIRUB SERPL-MCNC: 0.8 MG/DL
BILIRUB UR QL STRIP: NEGATIVE
BILIRUBIN DIRECT+TOT PNL SERPL-MCNC: 0.3 MG/DL (ref 0–0.5)
BILIRUBIN DIRECT+TOT PNL SERPL-MCNC: 0.5 MG/DL (ref 0–0.8)
BUN SERPL-MCNC: 12.4 MG/DL (ref 8.4–25.7)
CALCIUM SERPL-MCNC: 9.8 MG/DL (ref 8.4–10.2)
CD3+CD4+ CELLS # SPEC: 1047 UNIT/L (ref 589–1505)
CD3+CD4+ CELLS NFR BLD: 47.1 % (ref 31–59)
CHLORIDE SERPL-SCNC: 107 MMOL/L (ref 98–107)
CHOLEST SERPL-MCNC: 188 MG/DL
CHOLEST/HDLC SERPL: 2 {RATIO} (ref 0–5)
CO2 SERPL-SCNC: 25 MMOL/L (ref 22–29)
COLOR UR: YELLOW
CREAT SERPL-MCNC: 1.16 MG/DL (ref 0.73–1.18)
DEPRECATED CALCIDIOL+CALCIFEROL SERPL-MC: 26.6 NG/ML (ref 30–80)
EOSINOPHIL # BLD AUTO: 0.1 X10(3)/MCL (ref 0–0.9)
EOSINOPHIL NFR BLD AUTO: 2 %
ERYTHROCYTE [DISTWIDTH] IN BLOOD BY AUTOMATED COUNT: 12 % (ref 11.5–14.5)
EST. AVERAGE GLUCOSE BLD GHB EST-MCNC: 102.5 MG/DL
GLOBULIN SER-MCNC: 4.4 GM/DL (ref 2.4–3.5)
GLUCOSE (UA): NEGATIVE
GLUCOSE SERPL-MCNC: 90 MG/DL (ref 74–100)
HAV IGM SERPL QL IA: NONREACTIVE
HBA1C MFR BLD: 5.2 %
HBV CORE IGM SERPL QL IA: NONREACTIVE
HBV SURFACE AG SERPL QL IA: NONREACTIVE
HCT VFR BLD AUTO: 42.8 % (ref 40–51)
HCV AB SERPL QL IA: REACTIVE
HDLC SERPL-MCNC: 83 MG/DL (ref 35–60)
HGB BLD-MCNC: 14.1 GM/DL (ref 13.5–17.5)
HGB UR QL STRIP: NEGATIVE
HYALINE CASTS #/AREA URNS LPF: ABNORMAL /LPF
IMM GRANULOCYTES # BLD AUTO: 0.02 10*3/UL
IMM GRANULOCYTES NFR BLD AUTO: 0 %
KETONES UR QL STRIP: ABNORMAL
LDLC SERPL CALC-MCNC: 75 MG/DL (ref 50–140)
LEUKOCYTE ESTERASE UR QL STRIP: NEGATIVE
LYMPHOCYTES # BLD AUTO: 2.2 X10(3)/MCL (ref 0.6–4.6)
LYMPHOCYTES # BLD AUTO: 2223 UNIT/L (ref 1260–5520)
LYMPHOCYTES NFR BLD AUTO: 39 %
LYMPHOCYTES NFR LN MANUAL: 39 % (ref 28–48)
LYMPHOMA - T-CELL MARKERS SPEC-IMP: NORMAL
MCH RBC QN AUTO: 35.3 PG (ref 26–34)
MCHC RBC AUTO-ENTMCNC: 32.9 GM/DL (ref 31–37)
MCV RBC AUTO: 107 FL (ref 80–100)
MONOCYTES # BLD AUTO: 0.7 X10(3)/MCL (ref 0.1–1.3)
MONOCYTES NFR BLD AUTO: 13 %
NEUTROPHILS # BLD AUTO: 2.57 X10(3)/MCL (ref 2.1–9.2)
NEUTROPHILS NFR BLD AUTO: 45 %
NITRITE UR QL STRIP: NEGATIVE
PH UR STRIP: 7.5 [PH] (ref 4.5–8)
PLATELET # BLD AUTO: 223 X10(3)/MCL (ref 130–400)
PMV BLD AUTO: 10.4 FL (ref 7.4–10.4)
POTASSIUM SERPL-SCNC: 4.1 MMOL/L (ref 3.5–5.1)
PROT SERPL-MCNC: 8.2 GM/DL (ref 6.4–8.3)
PROT UR QL STRIP: 10 MG/DL
RBC # BLD AUTO: 4 X10(6)/MCL (ref 4.5–5.9)
RBC #/AREA URNS AUTO: ABNORMAL /HPF
RPR SER QL: REACTIVE
SODIUM SERPL-SCNC: 141 MMOL/L (ref 136–145)
SP GR UR STRIP: 1.02 (ref 1–1.03)
SQUAMOUS #/AREA URNS LPF: ABNORMAL /LPF
T PALLIDUM AB SER QL: REACTIVE
TRIGL SERPL-MCNC: 151 MG/DL (ref 34–140)
TSH SERPL-ACNC: 1.12 UIU/ML (ref 0.35–4.94)
UROBILINOGEN UR STRIP-ACNC: 3 MG/DL
VLDLC SERPL CALC-MCNC: 30 MG/DL
WBC # BLD AUTO: 5700 /MM3 (ref 4500–11500)
WBC # SPEC AUTO: 5.7 X10(3)/MCL (ref 4.5–11)
WBC #/AREA URNS AUTO: ABNORMAL /HPF

## 2021-12-15 ENCOUNTER — HISTORICAL (OUTPATIENT)
Dept: ADMINISTRATIVE | Facility: HOSPITAL | Age: 51
End: 2021-12-15

## 2021-12-15 LAB
ABS NEUT (OLG): 1.79 X10(3)/MCL (ref 2.1–9.2)
ALBUMIN SERPL-MCNC: 4 GM/DL (ref 3.5–5)
ALBUMIN/GLOB SERPL: 1 RATIO (ref 1.1–2)
ALP SERPL-CCNC: 66 UNIT/L (ref 40–150)
ALT SERPL-CCNC: 18 UNIT/L (ref 0–55)
APPEARANCE, UA: CLEAR
AST SERPL-CCNC: 22 UNIT/L (ref 5–34)
BACTERIA #/AREA URNS AUTO: ABNORMAL /HPF
BASOPHILS # BLD AUTO: 0 X10(3)/MCL (ref 0–0.2)
BASOPHILS NFR BLD AUTO: 0 %
BILIRUB SERPL-MCNC: 1.1 MG/DL
BILIRUB UR QL STRIP: NEGATIVE
BILIRUBIN DIRECT+TOT PNL SERPL-MCNC: 0.4 MG/DL (ref 0–0.5)
BILIRUBIN DIRECT+TOT PNL SERPL-MCNC: 0.7 MG/DL (ref 0–0.8)
BUN SERPL-MCNC: 9.9 MG/DL (ref 8.4–25.7)
CALCIUM SERPL-MCNC: 9.5 MG/DL (ref 8.7–10.5)
CD3+CD4+ CELLS # SPEC: 994 UNIT/L (ref 589–1505)
CD3+CD4+ CELLS NFR BLD: 56.4 % (ref 31–59)
CHLORIDE SERPL-SCNC: 103 MMOL/L (ref 98–107)
CHOLEST SERPL-MCNC: 177 MG/DL
CHOLEST/HDLC SERPL: 2 {RATIO} (ref 0–5)
CO2 SERPL-SCNC: 25 MMOL/L (ref 22–29)
COLOR UR: ABNORMAL
CREAT SERPL-MCNC: 0.99 MG/DL (ref 0.73–1.18)
DEPRECATED CALCIDIOL+CALCIFEROL SERPL-MC: 17.1 NG/ML (ref 30–80)
EOSINOPHIL # BLD AUTO: 0.1 X10(3)/MCL (ref 0–0.9)
EOSINOPHIL NFR BLD AUTO: 2 %
ERYTHROCYTE [DISTWIDTH] IN BLOOD BY AUTOMATED COUNT: 12.7 % (ref 11.5–14.5)
EST CREAT CLEARANCE SER (OHS): 82.33 ML/MIN
GLOBULIN SER-MCNC: 4.1 GM/DL (ref 2.4–3.5)
GLUCOSE (UA): NEGATIVE
GLUCOSE SERPL-MCNC: 85 MG/DL (ref 74–100)
HAV IGM SERPL QL IA: NONREACTIVE
HBV CORE IGM SERPL QL IA: NONREACTIVE
HBV SURFACE AG SERPL QL IA: NONREACTIVE
HCT VFR BLD AUTO: 43.2 % (ref 40–51)
HCV AB SERPL QL IA: REACTIVE
HDLC SERPL-MCNC: 82 MG/DL (ref 35–60)
HGB BLD-MCNC: 15 GM/DL (ref 13.5–17.5)
HGB UR QL STRIP: NEGATIVE
HYALINE CASTS #/AREA URNS LPF: ABNORMAL /LPF
KETONES UR QL STRIP: NEGATIVE
LDLC SERPL CALC-MCNC: 80 MG/DL (ref 50–140)
LEUKOCYTE ESTERASE UR QL STRIP: NEGATIVE
LYMPHOCYTES # BLD AUTO: 1.7 X10(3)/MCL (ref 0.6–4.6)
LYMPHOCYTES # BLD AUTO: 1763 UNIT/L (ref 1260–5520)
LYMPHOCYTES NFR BLD AUTO: 43 %
LYMPHOCYTES NFR LN MANUAL: 43 % (ref 28–48)
LYMPHOMA - T-CELL MARKERS SPEC-IMP: ABNORMAL
MCH RBC QN AUTO: 36.4 PG (ref 26–34)
MCHC RBC AUTO-ENTMCNC: 34.7 GM/DL (ref 31–37)
MCV RBC AUTO: 104.9 FL (ref 80–100)
MONOCYTES # BLD AUTO: 0.5 X10(3)/MCL (ref 0.1–1.3)
MONOCYTES NFR BLD AUTO: 12 %
NEG CONT SPOT COUNT: NORMAL
NEUTROPHILS # BLD AUTO: 1.79 X10(3)/MCL (ref 2.1–9.2)
NEUTROPHILS NFR BLD AUTO: 44 %
NITRITE UR QL STRIP: NEGATIVE
NRBC BLD AUTO-RTO: 0 % (ref 0–0.2)
PANEL A SPOT COUNT: 0
PANEL B SPOT COUNT: 0
PH UR STRIP: 7 [PH] (ref 4.5–8)
PLATELET # BLD AUTO: 184 X10(3)/MCL (ref 130–400)
PMV BLD AUTO: 10.6 FL (ref 7.4–10.4)
POS CONT SPOT COUNT: NORMAL
POTASSIUM SERPL-SCNC: 4.2 MMOL/L (ref 3.5–5.1)
PROT SERPL-MCNC: 8.1 GM/DL (ref 6.4–8.3)
PROT UR QL STRIP: NEGATIVE
RBC # BLD AUTO: 4.12 X10(6)/MCL (ref 4.5–5.9)
RBC #/AREA URNS AUTO: ABNORMAL /HPF
RPR SER QL: NORMAL
SODIUM SERPL-SCNC: 137 MMOL/L (ref 136–145)
SP GR UR STRIP: 1.01 (ref 1–1.03)
SQUAMOUS #/AREA URNS LPF: ABNORMAL /LPF
T PALLIDUM AB SER QL: REACTIVE
T-SPOT.TB: NORMAL
TRIGL SERPL-MCNC: 77 MG/DL (ref 34–140)
TSH SERPL-ACNC: 1.55 UIU/ML (ref 0.35–4.94)
UROBILINOGEN UR STRIP-ACNC: 2 MG/DL
VLDLC SERPL CALC-MCNC: 15 MG/DL
WBC # BLD AUTO: 4100 /MM3 (ref 4500–11500)
WBC # SPEC AUTO: 4.1 X10(3)/MCL (ref 4.5–11)
WBC #/AREA URNS AUTO: ABNORMAL /HPF

## 2022-04-10 ENCOUNTER — HISTORICAL (OUTPATIENT)
Dept: ADMINISTRATIVE | Facility: HOSPITAL | Age: 52
End: 2022-04-10
Payer: MEDICAID

## 2022-04-29 VITALS
OXYGEN SATURATION: 98 % | WEIGHT: 180.31 LBS | DIASTOLIC BLOOD PRESSURE: 81 MMHG | WEIGHT: 190 LBS | WEIGHT: 195.56 LBS | BODY MASS INDEX: 30.69 KG/M2 | SYSTOLIC BLOOD PRESSURE: 147 MMHG | BODY MASS INDEX: 28.3 KG/M2 | HEIGHT: 67 IN | DIASTOLIC BLOOD PRESSURE: 95 MMHG | HEIGHT: 67 IN | BODY MASS INDEX: 29.82 KG/M2 | SYSTOLIC BLOOD PRESSURE: 127 MMHG | HEIGHT: 67 IN

## 2022-04-29 NOTE — PROGRESS NOTES
"   Patient:   Barry Hilton            MRN: [id number]            FIN: [id number]               Age:   46 years     Sex:  Male     :  1970   Associated Diagnoses:   Right carpal tunnel syndrome; Tobacco abuse counseling   Author:   Breezy Hunt NP      Chief Complaint   2017 12:49 CDT      RIGHT HAND PAIN, C/O PAIN 10/10        History of Present Illness   45 yo AA female presents to ortho clinic for c/o right hand pain and numbness.  Reports that the pain and numbness to right hand started "eight years ago after I had a procedure done on my right armpit".  Denies using any wrist brace for symptom relief.  Denies ever receiving injections for symptom relief.  Admits taking OTC medication as needed for pain with minimal relief.  Denies any known injury or trauma.  Denies ever having EMG/NCS completed.  Current daily smoker.  No other complaints today.      Review of Systems   ROS reviewed as documented in chart      Health Status   Allergies:    Allergic Reactions (Selected)  No Known Allergies,    Allergies (1) Active Reaction  No Known Allergies None Documented     Current medications:  (Selected)   Prescriptions  Prescribed  Atripla oral tablet: See Instructions, TAKE 1 TABLET BY MOUTH AT BEDTIME, # 30 tab(s), 4 Refill(s), Pharmacy: [facility]/pharmacy #[id number]  Vitamin D 50,000 intl units oral capsule: 50,000 IntUnit = 1 cap(s), Oral, qWeek, # 5 cap(s), 2 Refill(s), Pharmacy: [facility]/pharmacy #[id number]  gabapentin 300 mg oral capsule: 300 mg = 1 cap(s), Oral, TID, # 90 cap(s), 3 Refill(s), Pharmacy: [facility]/pharmacy #[id number]  tiZANidine 4 mg oral tablet: 4 mg = 1 tab(s), Oral, q8hr, PRN PRN as needed for muscle spasm, # 60 tab(s), 2 Refill(s), Pharmacy: [facility]/pharmacy #[id number],    Home Medications (4) Active  Atripla oral tablet See Instructions  gabapentin 300 mg oral capsule 300 mg = 1 cap(s), Oral, TID  tiZANidine 4 mg oral tablet 4 mg = 1 tab(s), PRN, Oral, q8hr  Vitamin D 50,000 intl units oral capsule 50,000 " IntUnit = 1 cap(s), Oral, qWeek  ,    No qualifying data available     Problem list:    All Problems  Abdominal mass(  Confirmed  ) / SNOMED CT 011204064 / Confirmed  HIV (Human Immunodeficiency Virus Infection) / ICD-9-CM V08 / Confirmed  Obesity / SNOMED CT 3354449646 / Probable  Tobacco user(  Probable Diagnosis  ) / SNOMED CT 207144235 / Confirmed  Hepatitis C without hepatic coma(  Confirmed  ) / SNOMED CT 88734785 / Confirmed  Vitamin D deficiency(  Confirmed  ) / SNOMED CT 97637286 / Confirmed,    Active Problems (6)  Abdominal mass(  Confirmed  )   Hepatitis C without hepatic coma(  Confirmed  )   HIV (Human Immunodeficiency Virus Infection)   Obesity   Tobacco user(  Probable Diagnosis  )   Vitamin D deficiency(  Confirmed  )         Histories   Past Medical History:    Active  HIV (Human Immunodeficiency Virus Infection) (V08)   Family History:    No family history items have been selected or recorded.   Procedure history:    Excision of pilonidal cyst or sinus; extensive (51000).  No ([id number]).  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 No    Do you ever drink more than intended? No    Has anyone been hurt or at risk by your drinking? No    Ready to change: No    Concerns about alcohol use in household: No    Employment/School  04/06/2015  Status: Unemployed    Exercise  04/06/2015  Exercise type: Walking    Home/Environment  04/06/2015  Lives with: Significant other    Living situation: Home/Independent    Alcohol abuse in household: No    Substance abuse in household: No    Smoker in household: Yes     Injuries/Abuse/Neglect in household: No    Feels unsafe at home: No    Safe place to go: Yes    Family/Friends available to help: Yes    Concern for family members at home: No    Major illness in household: No    Financial concerns: No    Concerns over TV/Computer/Game use: No    Nutrition/Health  04/06/2015  Type of diet: Regular    Substance Abuse  04/06/2015 Risk Assessment: Denies Substance Abuse    Tobacco  04/06/2015  Use: Current every day smoker    Type: Cigarettes    Tobacco use per day: 20    Number of years: 9    Started at age: 34.0 Years    Previous treatment: None    Ready to change: No    Concerns about tobacco use in household: No  .        Physical Examination   Vital Signs   8/22/2017 12:49 CDT      Peripheral Pulse Rate     91 bpm                             Systolic Blood Pressure   147 mmHg  HI                             Diastolic Blood Pressure  95 mmHg  HI     Measurements from flowsheet : Measurements   8/22/2017 12:49 CDT      Weight Dosing             86.18 kg                             Weight Measured           86.18 kg                             Weight Measured and Calculated in Lbs     189.99 lb                             Height/Length Dosing      170 cm                             Height/Length Measured    170 cm                             BSA Measured              2.02 m2                             Body Mass Index Measured  29.82 kg/m2     General:  Alert and oriented.    HENT:  Normocephalic.    Neck:  Supple.    Integumentary:  Warm, Dry, Pink.    Neurologic:  No focal deficits.    Cognition and Speech:  Speech clear and coherent.    Psychiatric:  Appropriate mood & affect.    Right hand/wrist exam:  Good ROM with no pain associated with movement.  Mild TTP to volar aspect of wrist with no swelling, bruising or erythema noted.  Tinels (+).  Phalens (+).  Durkans (+).  Skin intact.  +2 radial pulse.  Cap refill < 2 sec.      Health Maintenance      Health Maintenance     Pending  (in the next year)        OverDue           HIV Screening due  09/25/16  and every 1  year(s)        Due            Alcohol Misuse Screening due  08/22/17  and every 1  year(s)           Aspirin Therapy for CVD Prevention due  08/22/17  and every 1  year(s)        Due In Future            Influenza Vaccine not due until  10/02/17  and every 1  year(s)           Lipid Screening not due until  05/25/18  and every 1  year(s)     Satisfied (in the past 1 year)        Satisfied            Blood Pressure Screening on  08/22/17.  Satisfied by Shari Hassan MA           Body Mass Index Check on  08/22/17.  Satisfied by Shari Hassan MA           Depression Screening on  08/22/17.  Satisfied by Shari Hassan MA           Diabetes Screening on  05/25/17.  Satisfied by Netta Glover           Influenza Vaccine on  02/16/17.  Satisfied by Neetu Whitney LPN           Lipid Screening on  05/25/17.  Satisfied by Netta Glover           Obesity Screening on  08/22/17.  Satisfied by Shari Hassan MA           Tobacco Use Screening on  08/22/17.  Satisfied by Shari Hassan MA          Procedure   Joint aspiration/ injection procedure   Date/ Time:  8/22/2017 13:52:00.     Confirmed: patient, procedure, side, site, safety procedures followed.     Performed by: self.     Informed consent: signed by patient.     Indication: symptomatic relief.     Location: the right wrist.     Preparation and technique: skin prep alcohol/iodophor (Dura Prep), sterile preparation of site in usual fashion.     Joint injected: with  Lidocaine 1% 1mL and Kenalog 40mg 1mL.     Procedure tolerated: well.        Review / Management   Results review:     No qualifying data available.       Impression and Plan   Diagnosis     Right carpal tunnel syndrome (KDG08-VZ G56.01).     Tobacco abuse counseling (BUH02-YV Z71.6).       Plan:  1.  Right wrist injection.  2.  Velcro wrist brace given to use for symptom relief.  3.  Diclofenac sodium  prescription given for pain relief.  4.  Ice compresses and activity modifications as needed for symptom relief.  5.  Discussed risks of smoking and pt. does not want to quit at this time.  6.  RTC PRN.   Patient Instructions:  Carpal Tunnel Syndrome, Easy-to-Read, Steps to Quit Smoking.

## 2022-04-29 NOTE — PROGRESS NOTES
Patient:   Barry Hilton            MRN: [id number]            FIN: [id number]               Age:   48 years     Sex:  Male     :  1970   Associated Diagnoses:   None   Author:   Abiola MICHEL, Mainor KRAMER      Barry had surgery planned for 2019. This was cancelled due to a positive UDS. He will not be seen and rescheduled without a negative UDS obtained from his PCP.

## 2022-05-02 NOTE — HISTORICAL [facility] CERNER
This is a historical note converted from [facility]. Formatting and pictures may have been removed.  Please reference [facility] for original formatting and attached multimedia. Chief Complaint  Follow up, no complaints  History of Present Illness  46-year-old  male with history of co-infection HIV and hepatitis C, history of syphilis previously treated with low positive RPR titer.? Patient is compliant with Atripla, denying any compliant.? Last CD4 was 1055 in February 2016 with vital load undetectable [less than 20].? Patient still refuses any treatment for hepatitis C, liver functions within normal limit  For vitamin D deficiency patient forgot to  his vitamin D replacement  Review of Systems  14 items ROS reviewed, negative except as above  Physical Exam  Vitals & Measurements  T:?36.5? ?C ?(Oral)? HR:?71?(Peripheral)? RR:?20? BP:?137/91? HT:?168?cm? HT:?168?cm? WT:?86.8?kg? WT:?86.8?kg? BMI:?30.75?  General: Alert and oriented, no acute distress  Eye: PERRLA, EOMI  HEENT: Normocephalic atraumatic, moist oral mucosa  Neck: Supple, nontender, no carotid bruit, no JVD, no thyromegaly  Respiratory: Lungs clear to auscultation bilateral, nonlabored respirations, breath sounds equal bilaterally  Cardiovascular: Normal rate, regular rhythm, no murmur, no gallop, no peripheral edema, pulses in all extremity  Gastrointestinal: Soft, nontender, nondistended, no organomegaly, bowel sounds present in all 4 quadrants  Neurologic: Alert and oriented, normal sensation, normal motor power, no focal deficits, cranial nerves II-12 within normal limits,  Assessment/Plan  1.?HIV (human immunodeficiency virus infection)  ?CD4?1055, vital load less than 20  Continue Atripla  Vitamin D deficiency,?refill prescription?for?replacement, incarceration to ?occasional compliance  ?advised about safe sex, avoiding sharing razors, toothbrush, needles  Ordered:  .Cd4 Lymphocytes, Routine collect, *Est. 05/25/17 3:00:00  CDT, Blood, Order for future visit, *Est. Stop date 05/25/17 3:00:00 CDT, Lab Collect, HIV (human immunodeficiency virus infection)  Viral hepatitis C  Vitamin D deficiency  Tobacco user(  Probable Diagnosis...  CBC w/ Auto Diff, Routine collect, *Est. 05/25/17 3:00:00 CDT, Blood, Order for future visit, *Est. Stop date 05/25/17 3:00:00 CDT, Lab Collect, HIV (human immunodeficiency virus infection)  Viral hepatitis C  Vitamin D deficiency  Tobacco user(  Probable Diagnosis...  Clinic Follow up, *Est. 08/25/17 3:00:00 CDT, Order for future visit, HIV (human immunodeficiency virus infection)  Viral hepatitis C  Vitamin D deficiency  Tobacco user(  Probable Diagnosis  ), [facility]  Comprehensive Metabolic Panel, Routine collect, *Est. 05/25/17 3:00:00 CDT, Blood, Order for future visit, *Est. Stop date 05/25/17 3:00:00 CDT, Lab Collect, HIV (human immunodeficiency virus infection)  Viral hepatitis C  Vitamin D deficiency  Tobacco user(  Probable Diagnosis...  HIV-1 RNA Qnt By Real-Time PCR-[facility] [id number], Routine collect, *Est. 05/25/17 3:00:00 CDT, Blood, Order for future visit, *Est. Stop date 05/25/17 3:00:00 CDT, Lab Collect, HIV (human immunodeficiency virus infection)  Viral hepatitis C  Vitamin D deficiency  Tobacco user(  Probable Diagnosis...  Lipid Panel, Routine collect, *Est. 05/25/17 3:00:00 CDT, Blood, Order for future visit, *Est. Stop date 05/25/17 3:00:00 CDT, Lab Collect, HIV (human immunodeficiency virus infection)  Viral hepatitis C  Vitamin D deficiency  Tobacco user(  Probable Diagnosis...  RPR, Routine collect, *Est. 05/25/17 3:00:00 CDT, Blood, Order for future visit, *Est. Stop date 05/25/17 3:00:00 CDT, Lab Collect, HIV (human immunodeficiency virus infection)  Viral hepatitis C  Vitamin D deficiency  Tobacco user(  Probable Diagnosis...  ?  2.?Viral hepatitis C  ?Liver functions within normal limit, fiber sure f 1-2, patient continues to  refuse?treatment  Ordered:  .Cd4 Lymphocytes, Routine collect, *Est. 05/25/17 3:00:00 CDT, Blood, Order for future visit, *Est. Stop date 05/25/17 3:00:00 CDT, Lab Collect, HIV (human immunodeficiency virus infection)  Viral hepatitis C  Vitamin D deficiency  Tobacco user(  Probable Diagnosis...  CBC w/ Auto Diff, Routine collect, *Est. 05/25/17 3:00:00 CDT, Blood, Order for future visit, *Est. Stop date 05/25/17 3:00:00 CDT, Lab Collect, HIV (human immunodeficiency virus infection)  Viral hepatitis C  Vitamin D deficiency  Tobacco user(  Probable Diagnosis...  Clinic Follow up, *Est. 08/25/17 3:00:00 CDT, Order for future visit, HIV (human immunodeficiency virus infection)  Viral hepatitis C  Vitamin D deficiency  Tobacco user(  Probable Diagnosis  ), [facility]  Comprehensive Metabolic Panel, Routine collect, *Est. 05/25/17 3:00:00 CDT, Blood, Order for future visit, *Est. Stop date 05/25/17 3:00:00 CDT, Lab Collect, HIV (human immunodeficiency virus infection)  Viral hepatitis C  Vitamin D deficiency  Tobacco user(  Probable Diagnosis...  HIV-1 RNA Qnt By Real-Time PCR-[facility] [id number], Routine collect, *Est. 05/25/17 3:00:00 CDT, Blood, Order for future visit, *Est. Stop date 05/25/17 3:00:00 CDT, Lab Collect, HIV (human immunodeficiency virus infection)  Viral hepatitis C  Vitamin D deficiency  Tobacco user(  Probable Diagnosis...  Lipid Panel, Routine collect, *Est. 05/25/17 3:00:00 CDT, Blood, Order for future visit, *Est. Stop date 05/25/17 3:00:00 CDT, Lab Collect, HIV (human immunodeficiency virus infection)  Viral hepatitis C  Vitamin D deficiency  Tobacco user(  Probable Diagnosis...  RPR, Routine collect, *Est. 05/25/17 3:00:00 CDT, Blood, Order for future visit, *Est. Stop date 05/25/17 3:00:00 CDT, Lab Collect, HIV (human immunodeficiency virus infection)  Viral hepatitis C  Vitamin D deficiency  Tobacco user(  Probable Diagnosis...  ?  3.?Vitamin D  deficiency  ?Replacement  Ordered:  ergocalciferol, 50,000 IntUnit = 1 cap(s), Oral, qWeek, # 5 cap(s), 2 Refill(s), Pharmacy: [facility]/pharmacy #[id number]  .Cd4 Lymphocytes, Routine collect, *Est. 05/25/17 3:00:00 CDT, Blood, Order for future visit, *Est. Stop date 05/25/17 3:00:00 CDT, Lab Collect, HIV (human immunodeficiency virus infection)  Viral hepatitis C  Vitamin D deficiency  Tobacco user(  Probable Diagnosis...  CBC w/ Auto Diff, Routine collect, *Est. 05/25/17 3:00:00 CDT, Blood, Order for future visit, *Est. Stop date 05/25/17 3:00:00 CDT, Lab Collect, HIV (human immunodeficiency virus infection)  Viral hepatitis C  Vitamin D deficiency  Tobacco user(  Probable Diagnosis...  Clinic Follow up, *Est. 08/25/17 3:00:00 CDT, Order for future visit, HIV (human immunodeficiency virus infection)  Viral hepatitis C  Vitamin D deficiency  Tobacco user(  Probable Diagnosis  ), [facility]  Comprehensive Metabolic Panel, Routine collect, *Est. 05/25/17 3:00:00 CDT, Blood, Order for future visit, *Est. Stop date 05/25/17 3:00:00 CDT, Lab Collect, HIV (human immunodeficiency virus infection)  Viral hepatitis C  Vitamin D deficiency  Tobacco user(  Probable Diagnosis...  HIV-1 RNA Qnt By Real-Time PCR-[facility] [id number], Routine collect, *Est. 05/25/17 3:00:00 CDT, Blood, Order for future visit, *Est. Stop date 05/25/17 3:00:00 CDT, Lab Collect, HIV (human immunodeficiency virus infection)  Viral hepatitis C  Vitamin D deficiency  Tobacco user(  Probable Diagnosis...  Lipid Panel, Routine collect, *Est. 05/25/17 3:00:00 CDT, Blood, Order for future visit, *Est. Stop date 05/25/17 3:00:00 CDT, Lab Collect, HIV (human immunodeficiency virus infection)  Viral hepatitis C  Vitamin D deficiency  Tobacco user(  Probable Diagnosis...  RPR, Routine collect, *Est. 05/25/17 3:00:00 CDT, Blood, Order for future visit, *Est. Stop date 05/25/17 3:00:00 CDT, Lab Collect, HIV (human immunodeficiency virus infection)   Viral hepatitis C  Vitamin D deficiency  Tobacco user(  Probable Diagnosis...  ?  4.?Tobacco user(  Probable Diagnosis  )  ?Counseled about cessation, not interested  Ordered:  .Cd4 Lymphocytes, Routine collect, *Est. 05/25/17 3:00:00 CDT, Blood, Order for future visit, *Est. Stop date 05/25/17 3:00:00 CDT, Lab Collect, HIV (human immunodeficiency virus infection)  Viral hepatitis C  Vitamin D deficiency  Tobacco user(  Probable Diagnosis...  CBC w/ Auto Diff, Routine collect, *Est. 05/25/17 3:00:00 CDT, Blood, Order for future visit, *Est. Stop date 05/25/17 3:00:00 CDT, Lab Collect, HIV (human immunodeficiency virus infection)  Viral hepatitis C  Vitamin D deficiency  Tobacco user(  Probable Diagnosis...  Clinic Follow up, *Est. 08/25/17 3:00:00 CDT, Order for future visit, HIV (human immunodeficiency virus infection)  Viral hepatitis C  Vitamin D deficiency  Tobacco user(  Probable Diagnosis  ), [facility]  Comprehensive Metabolic Panel, Routine collect, *Est. 05/25/17 3:00:00 CDT, Blood, Order for future visit, *Est. Stop date 05/25/17 3:00:00 CDT, Lab Collect, HIV (human immunodeficiency virus infection)  Viral hepatitis C  Vitamin D deficiency  Tobacco user(  Probable Diagnosis...  HIV[facility] [id number], Routine collect, *Est. 05/25/17 3:00:00 CDT, Blood, Order for future visit, *Est. Stop date 05/25/17 3:00:00 CDT, Lab Collect, HIV (human immunodeficiency virus infection)  Viral hepatitis C  Vitamin D deficiency  Tobacco user(  Probable Diagnosis...  Lipid Panel, Routine collect, *Est. 05/25/17 3:00:00 CDT, Blood, Order for future visit, *Est. Stop date 05/25/17 3:00:00 CDT, Lab Collect, HIV (human immunodeficiency virus infection)  Viral hepatitis C  Vitamin D deficiency  Tobacco user(  Probable Diagnosis...  RPR, Routine collect, *Est. 05/25/17 3:00:00 CDT, Blood, Order for future visit, *Est. Stop date 05/25/17 3:00:00 CDT, Lab Collect, HIV  (human immunodeficiency virus infection)  Viral hepatitis C  Vitamin D deficiency  Tobacco user(  Probable Diagnosis...  ?  Orders:  efavirenz/emtricitabine/tenofovir, See Instructions, TAKE 1 TABLET BY MOUTH AT BEDTIME, # 30 tab(s), 4 Refill(s), Pharmacy: [facility]/pharmacy #[id number]  gabapentin, 300 mg = 1 cap(s), Oral, TID, # 90 cap(s), 3 Refill(s), Pharmacy: [facility]/pharmacy #[id number]   Problem List/Past Medical History  Abdominal mass(  Confirmed  )  Hepatitis C without hepatic coma(  Confirmed  )  HIV (Human Immunodeficiency Virus Infection)  Obesity  Tobacco user(  Probable Diagnosis  )  Vitamin D deficiency(  Confirmed  )  Historical  No historical problems  Procedure/Surgical History  Excision of pilonidal cyst or sinus; extensive., No.  Medications  Atripla oral tablet, See Instructions, 4 refills  gabapentin 300 mg oral capsule, 300 mg, 1 cap(s), Oral, TID, 3 refills  Mobic 7.5 mg oral tablet, 7.5 mg, 1 tab(s), Oral, Daily, PRN, 3 refills  tiZANidine 4 mg oral tablet, 4 mg, 1 tab(s), Oral, q8hr, PRN, 2 refills  Vitamin D 50,000 intl units oral capsule, 83066 IntUnit, 1 cap(s), Oral, qWeek, 2 refills  Allergies  No Known Allergies  Social History  Alcohol  Current, Beer, Liquor, 1-2 times per week, 52 drinks/episode average. Started age 14 Years. Previous treatment: None. Alcohol use interferes with work or home: No. Drinks more than intended: No. Others hurt by drinking: No. Ready to change: No. Household alcohol concerns: No.  Employment/School  Unemployed  Exercise  Exercise type: Walking.  Home/Environment  Lives with Significant other. Living situation: Home/Independent. Alcohol abuse in household: No. Substance abuse in household: No. Smoker in household: Yes. Injuries/Abuse/Neglect in household: No. Feels unsafe at home: No. Safe place to go: Yes. Family/Friends available for support: Yes. Concern for family members at home: No. Major illness in household: No. Financial concerns: No. TV/Computer concerns:  No.  Nutrition/Health  Regular  Substance Abuse - Denies Substance Abuse  Tobacco  Current every day smoker, Cigarettes, 20 per day. 9 year(s). Started age 34.0 Years. Previous treatment: None. Ready to change: No. Household tobacco concerns: No.      Patient seen and examined with resident. Agree with documented physical exam. Treatment plan reviewed and discussed with resident and is reasonable and appropriate.  MD Kajal  ?

## 2022-05-02 NOTE — HISTORICAL [facility] CERN[facility]
This is a historical note converted from [facility]. Formatting and pictures may have been removed.  Please reference [facility] for original formatting and attached multimedia. Chief Complaint  F/U Hep C and B 20 mgmt.  History of Present Illness  47 yo male with history of HIV/Hep C coinfection here today for followup  No complaints  Finished [place]  Here for SVR-12  Reports 100% compliance with Odefsey, missing no doses  Sexually active reports 100% compliance with condoms  No acute issues otherwise  No further issues today other than continued neuropathic pain  Review of Systems  Full 14 point ROS performed, all negative except as mentioned in HPI  Physical Exam  Vitals & Measurements  T:?36.7? ?C (Oral)? HR:?90(Peripheral)? RR:?12? BP:?122/79?  HT:?170?cm? HT:?170?cm? WT:?96.3?kg? WT:?96.3?kg? BMI:?33.32?  ????PE: Gen: AAOx3 in nad, comfortable  ????HEENT: no thrush  ????Neck: supple  ????CVS: RRR no m/r/g  ????C/L: CTA bilaterally  ????Abd: soft, nt/nd  ????Ext: no c/c/e  ????Skin: no rashes/nodules noted  Assessment/Plan  1.?HIV disease,?HIV disease  ?Discussed HIV status at length to include need for 100% medication compliance and adherence and safe sex counseling performed.? Patient voiced understanding to both.? Will check labs today to include cd4, viral load cbc and cmp.? Discussed importance of scheduled followup as well. Continue Odefsey.  Ordered:  emtricitabine/rilpivirine/tenofovir, 1 tab(s), Oral, Daily, # 30 tab(s), 4 Refill(s), Pharmacy: [facility]  ?  2.?Neuropathy  ?Secondary to HIV, debilitating unfortunately, continue neurontin.  ?  3.?Chronic hepatitis C,?Chronic hepatitis C  ?Check HCV Viral load today for SVR-12.? Avoid situations which could cause reinfection.  Ordered:  emtricitabine/rilpivirine/tenofovir, 1 tab(s), Oral, Daily, # 30 tab(s), 4 Refill(s), Pharmacy: [facility]  ?  Needs flu shot  ?Flu vaccine today  Ordered:  emtricitabine/rilpivirine/tenofovir, 1 tab(s),  Oral, Daily, # 30 tab(s), 4 Refill(s), Pharmacy: [facility]  ?  Orders:  gabapentin, 800 mg = 1 tab(s), Oral, TID, # 90 tab(s), 3 Refill(s), Pharmacy: [facility]   Problem List/Past Medical History  Ongoing  Abdominal mass(  Confirmed  )  Hepatitis C without hepatic coma(  Confirmed  )  HIV (Human Immunodeficiency Virus Infection)  Obesity  Tobacco user(  Probable Diagnosis  )  Vitamin D deficiency(  Confirmed  )  Historical  No qualifying data  Procedure/Surgical History  Excision of pilonidal cyst or sinus; extensive   Medications  CYCLOBENZAPR TAB 10MG, 10 mg= 1 tab(s), Oral, q8hr  DICLOFENAC TAB 75MG DR, 75 mg= 1 tab(s), Oral, BID  gabapentin 800 mg oral tablet, 800 mg= 1 tab(s), Oral, TID, 3 refills  Odefsey oral tablet, 1 tab(s), Oral, Daily, 4 refills  Allergies  No Known Allergies  Social History  Alcohol  Past, Beer, 07/24/2018  Employment/School  Unemployed, 04/06/2015  Exercise  Exercise type: Walking., 04/06/2015  Home/Environment  Lives with Significant other. Living situation: Home/Independent. Alcohol abuse in household: No. Substance abuse in household: No. Smoker in household: Yes. Injuries/Abuse/Neglect in household: No. Feels unsafe at home: No. Safe place to go: Yes. Family/Friends available for support: Yes. Concern for family members at home: No. Major illness in household: No. Financial concerns: No. TV/Computer concerns: No., 04/06/2015  Nutrition/Health  Regular, 04/06/2015  Sexual  Sexually active: No., 07/24/2018  Substance Abuse - Denies Substance Abuse, 04/06/2015  Never, 07/24/2018  Tobacco  Current every day smoker, Cigarettes, No, 20 per day., 10/23/2018  Immunizations  Vaccine Date Status Comments   influenza virus vaccine, inactivated 11/09/2017 Given    influenza virus vaccine, inactivated 02/16/2017 Given    pneumococcal 23-polyvalent vaccine 10/20/2016 Given    influenza virus vaccine, inactivated 10/20/2016 Given    influenza virus vaccine,  inactivated 02/22/2016 Given    tetanus/diphtheria/pertussis, acel(Tdap) 04/09/2015 Given Other :  thu would not scan   pneumococcal 13-valent conjugate vaccine 12/01/2014 Recorded    influenza virus vaccine, inactivated 12/01/2014 Recorded    influenza virus vaccine, inactivated 01/22/2014 Recorded    pneumococcal vacc 01/28/2013 Recorded    hepatitis A adult vaccine 09/05/2012 Recorded    hepatitis B vaccine 10/19/2011 Recorded    Health Maintenance  Health Maintenance  ???Pending?(in the next year)  ??? ??Due?  ??? ? ? ?ADL Screening due??10/23/18??and every 1??year(s)  ??? ? ? ?Alcohol Misuse Screening due??10/23/18??and every 1??year(s)  ??? ? ? ?Aspirin Therapy for CVD Prevention due??10/23/18??and every 1??year(s)  ??? ? ? ?Diabetes Screening due??10/23/18??and every?  ??? ? ? ?Influenza Vaccine due??10/23/18??and every?  ??? ??Due In Future?  ??? ? ? ?Smoking Cessation not due until??08/17/19??and every 1??year(s)  ???Satisfied?(in the past 1 year)  ??? ??Satisfied?  ??? ? ? ?Blood Pressure Screening on??10/23/18.??Satisfied by Rajinder López  ??? ? ? ?Body Mass Index Check on??10/23/18.??Satisfied by Rajinder López  ??? ? ? ?Depression Screening on??10/23/18.??Satisfied by Rajinder López  ??? ? ? ?Diabetes Screening on??07/24/18.??Satisfied by Netta Glover  ??? ? ? ?Influenza Vaccine on??10/23/18.??Satisfied by Rajinder López  ??? ? ? ?Lipid Screening on??05/17/18.??Satisfied by Susie Vanegas  ??? ? ? ?Obesity Screening on??10/23/18.??Satisfied by Rajinder López  ??? ? ? ?Smoking Cessation on??08/17/18.??Satisfied by Haroon ARIAS, Gareth Paz  ?  ?

## 2022-05-02 NOTE — HISTORICAL [facility] CERN[facility]
This is a historical note converted from [facility]. Formatting and pictures may have been removed.  Please reference [facility] for original formatting and attached multimedia. Chief Complaint  F/U for surgery booking Rt CTS  History of Present Illness  40-year-old male, right-hand-dominant, ,?EMG diagnosis of right cubital tunnel syndrome and right?carpal tunnel syndrome. ?Patient is HIV positive and hep C positive.??He has a history of cocaine use. ?He is less scheduled for surgery?carpal tunnel release cubital tunnel release back in February 2019. ?Unfortunately we have to cancel his surgery secondary to a positive UDS. ?He is here to discuss surgery. ?He says he is?quit?cocaine.? He still has symptoms of numbness?in the median nerve and ulnar nerve distributions of the right hand. ?He is using a?carpal tunnel splint which he gets some relief from.? He is tried a carpal tunnel injection in the past with mild benefit.  Review of Systems  As per HPI  Physical Exam  Vitals & Measurements  HT:?170?cm? WT:?88.7?kg? BMI:?30.69?  Gen:? NAD, A+Ox3  Cardio:? RRR  Pulm:? No increased WOB  ?   RUE:  Skin intact  No swelling  No TTP  Motor: intact ain, pin, ulnar  SILT: m/u/r  2+ RP, bcr, wwp  5 mm two-point discrimination and median ulnar nerve distributions right hand.  Positive Durkins, positive?Phalens. ?Negative Tinels at the medial elbow.  Assessment/Plan  Right hand pain?M79.641  48-year-old male, hep C positive, HIV positive, former cocaine user?with right carpal tunnel syndrome right cubital tunnel syndrome?confirmed on?electrodiagnostic testing.  -I will obtain a urine drug screen after his clinic visit today.? I explained to them that I think he would be a good candidate for right carpal tunnel release and right cubital tunnel release?however due to the fact that he had a positive UDS?several months ago?we cannot take the risk of expanding OR time?to schedule his case if he still actively using?illicit  substances?as he will be re-cancels and take a spot away from somebody else who could you surgery. ?He understands this and agrees to submit to a UDS. ?We will see him back in a couple weeks to review the results of the UDS.? If it is negative we consider moving forward with?surgery?if he is amenable.   Problem List/Past Medical History  Ongoing  Carpal tunnel syndrome  Cubital tunnel syndrome on right  Hepatitis C without hepatic coma(  Confirmed  )  HIV (Human Immunodeficiency Virus Infection)  Obesity  Tobacco user(  Probable Diagnosis  )  Vitamin D deficiency(  Confirmed  )  Historical  No qualifying data  Procedure/Surgical History  Excision of pilonidal cyst or sinus; extensive   Medications  Escitalopram 10 Mg Tablet, 10 mg= 1 tab(s), Oral, Daily  gabapentin 800 mg oral tablet, 800 mg= 1 tab(s), Oral, TID, 3 refills  Odefsey oral tablet, 1 tab(s), Oral, Daily, 4 refills  Allergies  No Known Allergies  Social History  Alcohol  Current, Beer, Liquor, 3-5 times per week, Started age 16 Years. Previous treatment: None. Alcohol use interferes with work or home: No. Drinks more than intended: No. Others hurt by drinking: No. Ready to change: No. Household alcohol concerns: No., 05/02/2019  Employment/School  Unemployed, 04/06/2015  Exercise  Exercise type: Walking., 04/06/2015  Home/Environment  Lives with Significant other. Living situation: Home/Independent. Alcohol abuse in household: No. Substance abuse in household: No. Smoker in household: Yes. Injuries/Abuse/Neglect in household: No. Feels unsafe at home: No. Safe place to go: Yes. Family/Friends available for support: Yes. Concern for family members at home: No. Major illness in household: No. Financial concerns: No. TV/Computer concerns: No., 04/06/2015  Nutrition/Health  Regular, 04/06/2015  Sexual  Gender Identity Identifies as male., 05/15/2019  Sexually active: No., 07/24/2018  Substance Abuse - Denies Substance Abuse, 04/06/2015  Never,  07/24/2018  Tobacco  10 or more cigarettes (1/2 pack or more)/day in last 30 days, Cigarettes, No, Ready to change: No. 30 per day., 05/15/2019  Immunizations  Vaccine Date Status Comments   meningococcal conjugate vaccine 05/02/2019 Given    influenza virus vaccine, inactivated 10/23/2018 Given    influenza virus vaccine, inactivated 11/09/2017 Given    influenza virus vaccine, inactivated 02/16/2017 Given    pneumococcal 23-polyvalent vaccine 10/20/2016 Given    influenza virus vaccine, inactivated 10/20/2016 Given    influenza virus vaccine, inactivated 02/22/2016 Given    tetanus/diphtheria/pertussis, acel(Tdap) 04/09/2015 Given Other :  armband would not scan   pneumococcal 13-valent conjugate vaccine 12/01/2014 Recorded    influenza virus vaccine, inactivated 12/01/2014 Recorded    influenza virus vaccine, inactivated 01/22/2014 Recorded    pneumococcal vacc 01/28/2013 Recorded    hepatitis A adult vaccine 09/05/2012 Recorded    hepatitis B vaccine 10/19/2011 Recorded    Health Maintenance  Health Maintenance  ???Pending?(in the next year)  ??? ??OverDue  ??? ? ? ?Diabetes Screening due??and every?  ??? ??Due?  ??? ? ? ?Aspirin Therapy for CVD Prevention due??05/15/19??and every 1??year(s)  ??? ??Due In Future?  ??? ? ? ?Alcohol Misuse Screening not due until??01/01/20??and every 1??year(s)  ??? ? ? ?Obesity Screening not due until??01/01/20??and every 1??year(s)  ??? ? ? ?Smoking Cessation not due until??01/01/20??and every 1??year(s)  ??? ? ? ?Blood Pressure Screening not due until??05/01/20??and every 1??year(s)  ??? ? ? ?Depression Screening not due until??05/01/20??and every 1??year(s)  ??? ? ? ?ADL Screening not due until??05/02/20??and every 1??year(s)  ??? ? ? ?Body Mass Index Check not due until??05/14/20??and every 1??year(s)  ???Satisfied?(in the past 1 year)  ??? ??Satisfied?  ??? ? ? ?ADL Screening on??05/02/19.??Satisfied by Key Orozco  ??? ? ? ?Alcohol Misuse Screening  on??05/02/19.??Satisfied by Key Orozco  ??? ? ? ?Blood Pressure Screening on??05/02/19.??Satisfied by Key Orozco  ??? ? ? ?Body Mass Index Check on??05/15/19.??Satisfied by Rajinder López  ??? ? ? ?Depression Screening on??05/02/19.??Satisfied by Key Orozco  ??? ? ? ?Diabetes Screening on??05/02/19.??Satisfied by Jamal Perez Jr.  ??? ? ? ?Influenza Vaccine on??10/23/18.??Satisfied by Kourtney Fish RN  ??? ? ? ?Lipid Screening on??05/02/19.??Satisfied by Jamal Perez Jr.  ??? ? ? ?Obesity Screening on??05/15/19.??Satisfied by Rajinder López  ??? ? ? ?Smoking Cessation on??05/02/19.??Satisfied by Key Orozco  ?  ?      Barry Hilton?s?medical history, physical exam findings right wrist /hand, diagnosis, and treatment outlined by??Shannan has been reviewed.??Treatment plan is determined to be reasonable and appropriate.?I was present during the evaluation.  Barry must have a negative UDS prior to surgical scheduling.  ?

## 2022-05-02 NOTE — HISTORICAL [facility] CERNER
This is a historical note converted from [facility]. Formatting and pictures may have been removed.  Please reference [facility] for original formatting and attached multimedia. Chief Complaint  Hep C f/u on [facility]  History of Present Illness  46 yo male with HIV/Hep C coinfection who presents to the office today without complaint for scheduled followup.  Feels well overall  No n/v/d/abd pain  No new rashes  ?  No fevers/chills/night sweats  Still c/o right hand pain tingling and burning, some improved with gabapentin, would like refill  Review of Systems  Full 14 point ROS performed, all negative except as mentioned in HPI  Physical Exam  Vitals & Measurements  T:?36.7? ?C (Oral)? HR:?72(Peripheral)? RR:?19? BP:?131/90?  HT:?170?cm? HT:?170?cm? WT:?93.6?kg? WT:?93.6?kg? BMI:?32.39?  ????PE: Gen: AAOx3 in nad, comfortable  ????HEENT: no thrush  ????Neck: supple  ????CVS: RRR no m/r/g  ????C/L: CTA bilaterally  ????Abd: soft, nt/nd  ????Ext: no c/c/e  ????Skin: no rashes/nodules noted  Assessment/Plan  1.?HIV disease  ?Discussed HIV status at length to include need for 100% medication compliance and adherence and safe sex counseling performed.? Patient voiced understanding to both.?? Discussed importance of scheduled followup as well.? Continue present ART  2.?Hepatitis C  ?????Encouraged continued abstinence from alcohol and illicit drugs. No tylenol at doses greater than 2g daily. No sharing needles, razors, nail clippers or razor blades. Counseled regarding safe sex at length, condoms offered.? Continue Harvoni, praised adherence.? Started therapy on 5/29, late on labs but obtain today   Problem List/Past Medical History  Ongoing  Abdominal mass(  Confirmed  )  Hepatitis C without hepatic coma(  Confirmed  )  HIV (Human Immunodeficiency Virus Infection)  Obesity  Tobacco user(  Probable Diagnosis  )  Vitamin D deficiency(  Confirmed  )  Historical  No qualifying data  Procedure/Surgical History  Excision of  pilonidal cyst or sinus; extensive  No   Medications  gabapentin 300 mg oral capsule, 300 mg= 1 cap(s), Oral, TID, 3 refills  Harvoni 90 mg-400 mg oral tablet, 1 tab(s), Oral, Daily, 2 refills  Mavyret 100 mg-40 mg oral tablet, 3 tab(s), Oral, Daily, 1 refills,? ?Unable to obtain  Odefsey oral tablet, 1 tab(s), Oral, Daily, 4 refills  Allergies  No Known Allergies  Social History  Alcohol  Past, Beer, 07/24/2018  Employment/School  Unemployed, 04/06/2015  Exercise  Exercise type: Walking., 04/06/2015  Home/Environment  Lives with Significant other. Living situation: Home/Independent. Alcohol abuse in household: No. Substance abuse in household: No. Smoker in household: Yes. Injuries/Abuse/Neglect in household: No. Feels unsafe at home: No. Safe place to go: Yes. Family/Friends available for support: Yes. Concern for family members at home: No. Major illness in household: No. Financial concerns: No. TV/Computer concerns: No., 04/06/2015  Nutrition/Health  Regular, 04/06/2015  Sexual  Sexually active: No., 07/24/2018  Substance Abuse - Denies Substance Abuse, 04/06/2015  Never, 07/24/2018  Tobacco  Current every day smoker, Cigarettes, 20 per day. 9 year(s). Started age 34.0 Years. Previous treatment: None. Ready to change: No. Household tobacco concerns: No., 04/06/2015  Immunizations  Vaccine Date Status Comments   influenza virus vaccine, inactivated 11/09/2017 Given    influenza virus vaccine, inactivated 02/16/2017 Given    pneumococcal 23-polyvalent vaccine 10/20/2016 Given    influenza virus vaccine, inactivated 10/20/2016 Given    influenza virus vaccine, inactivated 02/22/2016 Given    tetanus/diphtheria/pertussis, acel(Tdap) 04/09/2015 Given Other :  armband would not scan   pneumococcal 13-valent conjugate vaccine 12/01/2014 Recorded    influenza virus vaccine, inactivated 12/01/2014 Recorded    influenza virus vaccine, inactivated 01/22/2014 Recorded    pneumococcal vacc 01/28/2013 Recorded    hepatitis A  adult vaccine 09/05/2012 Recorded    hepatitis B vaccine 10/19/2011 Recorded    Health Maintenance  Health Maintenance  ???Pending?(in the next year)  ??? ??OverDue  ??? ? ? ?Influenza Vaccine due??and every?  ??? ??Due?  ??? ? ? ?Alcohol Misuse Screening due??07/24/18??and every 1??year(s)  ??? ? ? ?Aspirin Therapy for CVD Prevention due??07/24/18??and every 1??year(s)  ??? ? ? ?Diabetes Screening due??07/24/18??and every?  ??? ??Due In Future?  ??? ? ? ?Blood Pressure Screening not due until??05/17/19??and every 1??year(s)  ??? ? ? ?Body Mass Index Check not due until??05/17/19??and every 1??year(s)  ??? ? ? ?Depression Screening not due until??05/17/19??and every 1??year(s)  ???Satisfied?(in the past 1 year)  ??? ??Satisfied?  ??? ? ? ?Blood Pressure Screening on??07/24/18.??Satisfied by Rere Jackson RN  ??? ? ? ?Body Mass Index Check on??07/24/18.??Satisfied by Rere Jackson RN  ??? ? ? ?Depression Screening on??07/24/18.??Satisfied by Rere Jackson RN  ??? ? ? ?Diabetes Screening on??08/20/18.??Satisfied by Latrice Melo NP  ??? ? ? ?Influenza Vaccine on??11/09/17.??Satisfied by Kandis Killian LPN  ??? ? ? ?Lipid Screening on??05/17/18.??Satisfied by Susie Vanegas  ??? ? ? ?Obesity Screening on??07/24/18.??Satisfied by Rere Jackson RN  ??? ? ? ?Smoking Cessation on??07/24/18.??Satisfied by Rere Jackson RN  ?  ?

## 2022-05-02 NOTE — HISTORICAL [facility] CERNER
This is a historical note converted from [facility]. Formatting and pictures may have been removed.  Please reference [facility] for original formatting and attached multimedia. Chief Complaint  B20 checkup  History of Present Illness  50?yo male with history of HIV/Hep C coinfection here today for followup  No complaints  Finished Harvoni x 12 weeks, SVR12 achieved  Here for f/u without complaint other than having 2 episodes of angioedema related to seafood, symptoms have resolved but carries epi-pen with him, would like to discuss if he can eat different types of seafood today, Sheryl told him no.  Reports 100% compliance with Odefsey, missing no doses  Sexually active reports 100% compliance with condoms  No acute issues otherwise  No further issues today other than continued neuropathic pain  Review of Systems  Full 14 point ROS performed, all negative except as mentioned in HPI  Physical Exam  Vitals & Measurements  T:?36.7? ?C (Oral)? HR:?81(Peripheral)? RR:?16? BP:?132/88?  HT:?170.00?cm? WT:?83.800?kg? BMI:?29?  ????PE: Gen: AAOx3 in nad, comfortable  ????HEENT: no thrush  ????Neck: supple  ????CVS: RRR no m/r/g  ????C/L: CTA bilaterally  ????Abd: soft, nt/nd  ????Ext: no c/c/e  ????Skin: no rashes/nodules noted  Assessment/Plan  1.?HIV disease?B20  ?Discussed HIV status at length to include need for 100% medication compliance and adherence and safe sex counseling performed.? Patient voiced understanding to both.? Will check labs today to include cd4, viral load cbc and cmp.? Discussed importance of scheduled followup as well.? Refilled Odefsey.? Labs today.? Safe sex counseling performed.  Ordered:  cetirizine, 10 mg = 1 tab(s), Oral, Daily, # 30 tab(s), 3 Refill(s), Pharmacy: [facility], 170, cm, Height/Length Dosing, 11/12/20 13:27:00 CST, 83.8, kg, Weight Dosing, 11/12/20 13:27:00 CST  emtricitabine/rilpivirine/tenofovir, 1 tab(s), Oral, Daily, # 30 tab(s), 3 Refill(s), Pharmacy: [facility],  170, cm, Height/Length Dosing, 11/12/20 13:27:00 CST, 83.8, kg, Weight Dosing, 11/12/20 13:27:00 CST  ?  2.?Seafood allergy?Z91.013  start zyrtec 10mg po daily, advised to avoid seafood and carry his epi pen with him  ?  3.?Vitamin D deficiency?E55.9  ?refilled vitamin d today  Ordered:  ergocalciferol, 50,000 IntUnit = 1 cap(s), Oral, qWeek, # 12 cap(s), 0 Refill(s), Pharmacy: [facility], 170, cm, Height/Length Dosing, 11/12/20 13:27:00 CST, 83.8, kg, Weight Dosing, 11/12/20 13:27:00 CST  ?  4.?History of chronic hepatitis?Z87.19  ?Has completed svr-12 and achieved cure  ?   Problem List/Past Medical History  Ongoing  Carpal tunnel syndrome  Cubital tunnel syndrome on right  Hepatitis C without hepatic coma(  Confirmed  )  HIV (Human Immunodeficiency Virus Infection)  Obesity  Tobacco user(  Probable Diagnosis  )  Vitamin D deficiency(  Confirmed  )  Historical  No qualifying data  Procedure/Surgical History  Excision of pilonidal cyst or sinus; extensive   Medications  EPINEPHrine 0.3 mg injectable kit, 0.3 mg= 1 EA, IM, Once  ergocalciferol 50,000 intl units (1.25 mg) oral capsule, 58462 IntUnit= 1 cap(s), Oral, qWeek  Escitalopram 10 Mg Tablet, 10 mg= 1 tab(s), Oral, Daily  influenza virus vaccine, inactivated quadrivalent intramuscular suspension, 0.5 mL, IM, Once  Odefsey oral tablet, 1 tab(s), Oral, Daily, 3 refills  Zyrtec 10 mg oral tablet, 10 mg= 1 tab(s), Oral, Daily, 3 refills  Allergies  shellfish?(swelling, hives, itching)  Social History  Abuse/Neglect  No, No, Yes, 11/12/2020  Alcohol  Current, Beer, Liquor, 3-5 times per week, Started age 16 Years. Previous treatment: None. Alcohol use interferes with work or home: No. Drinks more than intended: No. Others hurt by drinking: No. Ready to change: No. Household alcohol concerns: No., 05/02/2019  Employment/School  Unemployed, 04/06/2015  Exercise  Exercise type: Walking., 04/06/2015  Home/Environment  Lives with Significant other.  Living situation: Home/Independent. Alcohol abuse in household: No. Substance abuse in household: No. Smoker in household: Yes. Injuries/Abuse/Neglect in household: No. Feels unsafe at home: No. Safe place to go: Yes. Family/Friends available for support: Yes. Concern for family members at home: No. Major illness in household: No. Financial concerns: No. TV/Computer concerns: No., 04/06/2015  Nutrition/Health  Regular, 04/06/2015  Sexual  Gender Identity Identifies as male., 05/15/2019  Sexually active: No., 07/24/2018  Substance Use - Denies Substance Abuse, 04/06/2015  Never, 07/24/2018  Tobacco  5-9 cigarettes (between 1/4 to 1/2 pack)/day in last 30 days, No, 11/12/2020  Immunizations  Vaccine Date Status Comments   meningococcal conjugate vaccine 05/02/2019 Given    influenza virus vaccine, inactivated 10/23/2018 Given    tetanus/diphtheria/pertussis, acel(Tdap) 10/15/2018 Recorded    influenza virus vaccine, inactivated 11/09/2017 Given    influenza virus vaccine, inactivated 02/16/2017 Given    pneumococcal 23-polyvalent vaccine 10/20/2016 Given    influenza virus vaccine, inactivated 10/20/2016 Given    influenza virus vaccine, inactivated 02/22/2016 Given    tetanus/diphtheria/pertussis, acel(Tdap) 04/09/2015 Given Other :  thu would not scan   pneumococcal 13-valent conjugate vaccine 12/01/2014 Recorded    influenza virus vaccine, inactivated 12/01/2014 Recorded    influenza virus vaccine, inactivated 01/22/2014 Recorded    pneumococcal vacc 01/28/2013 Recorded    hepatitis A adult vaccine 09/05/2012 Recorded    hepatitis B vaccine 10/19/2011 Recorded    influenza virus vaccine, inactivated 10/19/2011 Recorded    Health Maintenance  Health Maintenance  ???Pending?(in the next year)  ??? ??OverDue  ??? ? ? ?Smoking Cessation due??01/01/20??and every 1??year(s)  ??? ? ? ?Alcohol Misuse Screening due??01/02/20??and every 1??year(s)  ??? ? ? ?ADL Screening due??05/02/20??and every 1??year(s)  ???  ??Due?  ??? ? ? ?Influenza Vaccine due??10/01/20??and every 1??day(s)  ??? ? ? ?Aspirin Therapy for CVD Prevention due??11/12/20??and every 1??year(s)  ??? ? ? ?Colorectal Screening due??11/12/20??Unknown Frequency  ??? ? ? ?Zoster Vaccine due??11/12/20??Unknown Frequency  ??? ??Due In Future?  ??? ? ? ?Obesity Screening not due until??01/01/21??and every 1??year(s)  ???Satisfied?(in the past 1 year)  ??? ??Satisfied?  ??? ? ? ?Blood Pressure Screening on??11/12/20.??Satisfied by Camila Rosales  ??? ? ? ?Body Mass Index Check on??11/12/20.??Satisfied by Camila Rosales  ??? ? ? ?Depression Screening on??11/12/20.??Satisfied by Camila Rosales  ??? ? ? ?Diabetes Screening on??05/06/20.??Satisfied by Ana Gilmore, Jamal Brooks  ??? ? ? ?Influenza Vaccine on??11/12/20.??Satisfied by Pantera Adams MD  ??? ? ? ?Obesity Screening on??11/12/20.??Satisfied by Camila Rosales  ?

## 2022-05-02 NOTE — HISTORICAL [facility] CERNER
This is a historical note converted from [facility]. Formatting and pictures may have been removed.  Please reference [facility] for original formatting and attached multimedia. Chief Complaint  HCV F/U for SVR-12  History of Present Illness  Mr. Hilton is a 47 yo male with history of HIV/Hep C coinfection who presents to the office today without complaint for scheduled followup  Feels well overall, no n/v/d/abd pain  No new rashes  No fevers/chills/night sweats  No ha/changes in vision  Reports 100% compliance with Odefsey (ART) and missing no medications  In terms of his hepatitis C medication, was on Harvoni, finished his Harvoni in? August, but hasnt been back to see me for SVR-12/cure appt since that time  Here today for SVR-12  ?  Pretreatment HCV viral load of 3.65 million-->UD at week 4 and 12, awaiting SVR-12  ?  HIV labs:? CD4 886 with HIV VL undetectable in 10/18  Physical Exam  Vitals & Measurements  T:?36.9? ?C (Oral)? HR:?84(Peripheral)? RR:?18? BP:?132/94? SpO2:?98%?  HT:?170?cm? WT:?88.9?kg? BMI:?30.76?  ????PE: Gen: AAOx3 in nad, comfortable  ????HEENT: no thrush  ????Neck: supple  ????CVS: RRR no m/r/g  ????C/L: CTA bilaterally  ????Abd: soft, nt/nd  ????Ext: no c/c/e  ????Skin: no rashes/nodules noted  Assessment/Plan  1.?HIV disease?B20  ?Discussed HIV status at length to include need for 100% medication compliance and adherence and safe sex counseling performed.? Patient voiced understanding to both.? Will check labs today to include cd4, viral load cbc and cmp.? Discussed importance of scheduled followup as well.? Praised adherence, continue Odefsey, refills sent today.  Ordered:  emtricitabine/rilpivirine/tenofovir, 1 tab(s), Oral, Daily, # 30 tab(s), 4 Refill(s), Pharmacy: [facility]  ?  2.?Hepatitis C?B19.20  ?????Encouraged continued abstinence from alcohol and illicit drugs. No tylenol at doses greater than 2g daily. No sharing needles, razors, nail clippers or razor blades. Counseled  regarding safe sex at length, condoms offered.? Finished 12 weeks Harvoni.? SVR-12 labs today.? Advised to avoid situations which could cause reinfection.  ?  3.?Need for viral immunization?Z23  ?Menveo today  Ordered:  emtricitabine/rilpivirine/tenofovir, 1 tab(s), Oral, Daily, # 30 tab(s), 4 Refill(s), Pharmacy: [facility]  ?   Problem List/Past Medical History  Ongoing  Carpal tunnel syndrome  Cubital tunnel syndrome on right  Hepatitis C without hepatic coma(  Confirmed  )  HIV (Human Immunodeficiency Virus Infection)  Obesity  Tobacco user(  Probable Diagnosis  )  Vitamin D deficiency(  Confirmed  )  Historical  No qualifying data  Procedure/Surgical History  Excision of pilonidal cyst or sinus; extensive   Medications  Escitalopram 10 Mg Tablet, 10 mg= 1 tab(s), Oral, Daily  gabapentin 800 mg oral tablet, 800 mg= 1 tab(s), Oral, TID, 3 refills  Odefsey oral tablet, 1 tab(s), Oral, Daily, 4 refills  Allergies  No Known Allergies  Social History  Alcohol  Current, Beer, Liquor, 3-5 times per week, Started age 16 Years. Previous treatment: None. Alcohol use interferes with work or home: No. Drinks more than intended: No. Others hurt by drinking: No. Ready to change: No. Household alcohol concerns: No., 05/02/2019  Employment/School  Unemployed, 04/06/2015  Exercise  Exercise type: Walking., 04/06/2015  Home/Environment  Lives with Significant other. Living situation: Home/Independent. Alcohol abuse in household: No. Substance abuse in household: No. Smoker in household: Yes. Injuries/Abuse/Neglect in household: No. Feels unsafe at home: No. Safe place to go: Yes. Family/Friends available for support: Yes. Concern for family members at home: No. Major illness in household: No. Financial concerns: No. TV/Computer concerns: No., 04/06/2015  Nutrition/Health  Regular, 04/06/2015  Sexual  Sexually active: No., 07/24/2018  Substance Abuse - Denies Substance Abuse, 04/06/2015  Never,  07/24/2018  Tobacco  10 or more cigarettes (1/2 pack or more)/day in last 30 days, Cigarettes, No, Started age 34 Years. Previous treatment: None. Household tobacco concerns: No., 05/02/2019  Immunizations  Vaccine Date Status Comments   influenza virus vaccine, inactivated 10/23/2018 Given    influenza virus vaccine, inactivated 11/09/2017 Given    influenza virus vaccine, inactivated 02/16/2017 Given    pneumococcal 23-polyvalent vaccine 10/20/2016 Given    influenza virus vaccine, inactivated 10/20/2016 Given    influenza virus vaccine, inactivated 02/22/2016 Given    tetanus/diphtheria/pertussis, acel(Tdap) 04/09/2015 Given Other :  armband would not scan   pneumococcal 13-valent conjugate vaccine 12/01/2014 Recorded    influenza virus vaccine, inactivated 12/01/2014 Recorded    influenza virus vaccine, inactivated 01/22/2014 Recorded    pneumococcal vacc 01/28/2013 Recorded    hepatitis A adult vaccine 09/05/2012 Recorded    hepatitis B vaccine 10/19/2011 Recorded    Health Maintenance  Health Maintenance  ???Pending?(in the next year)  ??? ??OverDue  ??? ? ? ?Diabetes Screening due??and every?  ??? ??Due?  ??? ? ? ?Aspirin Therapy for CVD Prevention due??05/02/19??and every 1??year(s)  ??? ??Due In Future?  ??? ? ? ?Alcohol Misuse Screening not due until??01/01/20??and every 1??year(s)  ??? ? ? ?Obesity Screening not due until??01/01/20??and every 1??year(s)  ??? ? ? ?Smoking Cessation not due until??01/01/20??and every 1??year(s)  ??? ? ? ?Blood Pressure Screening not due until??05/01/20??and every 1??year(s)  ??? ? ? ?Body Mass Index Check not due until??05/01/20??and every 1??year(s)  ??? ? ? ?Depression Screening not due until??05/01/20??and every 1??year(s)  ???Satisfied?(in the past 1 year)  ??? ??Satisfied?  ??? ? ? ?ADL Screening on??05/02/19.??Satisfied by Key Orozco  ??? ? ? ?Alcohol Misuse Screening on??05/02/19.??Satisfied by Key Orozco  ??? ? ? ?Blood Pressure Screening  on??05/02/19.??Satisfied by Key Orozco  ??? ? ? ?Body Mass Index Check on??05/02/19.??Satisfied by Key Orozco  ??? ? ? ?Depression Screening on??05/02/19.??Satisfied by Key Orozco  ??? ? ? ?Diabetes Screening on??02/07/19.??Satisfied by Netta Glover  ??? ? ? ?Influenza Vaccine on??10/23/18.??Satisfied by Kourtney Fish RN  ??? ? ? ?Lipid Screening on??05/17/18.??Satisfied by Susie Vanegas  ??? ? ? ?Obesity Screening on??05/02/19.??Satisfied by Key Orozco  ??? ? ? ?Smoking Cessation on??05/02/19.??Satisfied by Key Orozco  ?  ?

## 2022-05-02 NOTE — HISTORICAL [facility] CERNER
This is a historical note converted from [employer]. Formatting and pictures may have been removed.  Please reference [employer] for original formatting and attached multimedia. Chief Complaint  follow up B20; c/o of 25lb weight loss since January and diarrhea off and on  History of Present Illness  50 year old male with a history of HIV, HEP C (SVR12 achieved) presents to clinic with c/o anxiety,?increased sleep, fatigue and weight loss. He reports a normal appetite, but has had some difficulty buying food due to finances; currently unemployed.?He was recently approved for food stamps, lives with a friend and feels safe at home.?He thinks?has lost 25 lbs over the last three months (records show 13 lb weight loss?since last visit?6 months ago).?He endorses both constipation and diarrhea intermittently, but denies melena or gross blood in his stool.?Pt reports increased anxiety?attributed to financial stress.?He endorses drinking 2 pints of liquor a day for over 10 years. He is compliant with his HIV medication and reports no adverse affects. He is sexually active with 1 partner and uses condoms every time. Additionally, he is concerned about a lump in his right axilla. He thinks its been there for a long time, but has recently been tender to touch. He denies any fever, night sweats, back pain, nausea, vomiting, chest pain, SOB, or abdominal pain.  Review of Systems  Constitutional:?+weight loss,?+?fatigue, no weakness, no fever  Eye:?no vision loss, eye redness, drainage, or pain  ENMT:?no sore throat, ear pain, sinus pain/congestion, nasal congestion/drainage  Respiratory:?no cough, no wheezing, no shortness of breath  Cardiovascular:?no chest pain, no palpitations, no edema  Gastrointestinal:?no nausea, vomiting, or diarrhea. No abdominal pain  Genitourinary:?no dysuria, no urinary frequency or urgency, no hematuria  Hema/Lymph:?no abnormal bruising or bleeding  Endocrine:?no heat or cold intolerance, no excessive thirst  or excessive urination  Musculoskeletal:?no muscle or joint pain, no joint swelling  Integumentary:?lump in his right axilla  Neurologic: no headache, no dizziness, no weakness or numbness  ?  Physical Exam  Vitals & Measurements  T:?36.8? ?C (Oral)? HR:?76(Peripheral)? RR:?18? BP:?132/82?  HT:?170.00?cm? WT:?77.950?kg? BMI:?26.97?  General:?well-developed well-nourished in no acute distress  Eye: PERRL, EOMI, clear conjunctiva, eyelids normal  HENT:?oropharynx and nasal mucosal surfaces moist and without exudate  Neck: full range of motion, no thyromegaly or lymphadenopathy  Respiratory:?clear to auscultation bilaterally  Cardiovascular:?regular rate and rhythm without murmurs, gallops or rubs  Gastrointestinal:?soft, non-tender, non-distended, without masses to palpation  Genitourinary: no CVA tenderness to palpation  Musculoskeletal:?full range of motion of all extremities/spine without limitation or discomfort  Integumentary: 1 cm black macule on left forearm with regular boarders, 1 golf ball sized lump in right axilla tender to palpation  Neurologic: cranial nerves II-VII grossly intact, no obvious focal neurological deficits  ?  Assessment/Plan  HIV Disease B20  -Pt on Odefsey, reports taking it every day without AE  -Discussed safe sex practices, pt acknowledged understanding  -CMC, CMP, CD4, Viral load, Hepatitis Panel, RPR, GC/CT NAAT, A1c, Lipids,?ordered  -Discussed importance of taking medication every day and following up regularly in clinic  ?   Unexplained Weight Loss  Anxiety vs Alcohol Use  -Pt has lost 13 lbs over the last 6 months.?His last viral load and CD4 count are reassuring. He denies and typical B symptoms.  -Recent financial trouble and alcohol use support poor caloric intake as probable cause  -FIT test and TSH?ordered  ?   Right Axillary Mass  -Probably a reactive lymph node  -About the size of a golfball and tender to palpation  -Referral to Surgery Clinic for evaluationh of  biopsy  ?   Skin Lesion  -Looks like a nevus with regular boarders and homogenous black color, but pt reports it has been growing in size recently  -Given recent weight loss, will punch biopsy today  ?   Anxiety  -Pt endorses constant worry about financial stress and weight loss  -Start Lexapro 10mg daily  ?   Vitamin D Deficiency  -Ergocalciferol refilled  -Vit D level ordered  ?   Hx of Hepatitis C  -SVR12 achieved, now cured  ?  ?   Gonzalo Mcdaniel  ?  ID ATTENDING ADDENDUM TO MEDICAL STUDENT NOTE ABOVE  Patient seen and examined with Medical Student. Lab and imaging review, physical examination and medication reconciliation performed personally by me.?  ?  Subjective:  Well known to me, here today c/o  1)? HIV followup:? reports 100% compliance with ART (odefsey) and missing no doses, afebrile, no cp/sob/ha/changes in vision and not sexually active  2)? Weight loss:? Impressive 25 lb weight loss subjectively in last 4 or so months, 13 lbs since last visit objectively, unintentional, has been drinking more etoh than prior and having more depression and situational difficulties than prior.? No BRBPR, no other issues to note.?  3)? Lesion to left forearm, dark, largening in size, not painful  4)? painful mass (egg sized) to right axilla, would like removed or biopsied, worrying him  5)? Depression, no si/hi  ?  Objective:  PE: Gen: AAOx3 in nad, comfortable  HEENT: no thrush  Neck: supple  CVS: RRR no m/r/g  C/L: CTA bilaterally  Abd: soft, nt/nd  Ext: no c/c/e  Skin: no rashes/nodules noted, hyperpigmented plaque to left forearm, 1x1 cm noted  Lymph:? large egg sized mobile firm painful nodule to right axilla  ?  Labs reviewed, imaging reviewed, physician notes reviewed  ?  ROS: ?Full 14 point ROS performed, all negative except as mentioned in HPI  ?  Impression:  HIV  Depression  ETOH use  Weight loss  Right axillary Mass  Left arm lesion  ?  Plan:  ?  Discussed HIV status at length to include need for 100%  medication compliance and adherence and safe sex counseling performed.? Patient voiced understanding to both.? Will check labs today to include cd4, viral load cbc and cmp.? Discussed importance of scheduled followup as well.? Refill Odefsey  ?  CT a/p in 2/21 normal  ?  Fit testing as he is 49 yo  ?  Refer to gen surg to remove/bx this right axillary mass  ?  Punch bx left forearm lesion  Procedure Note:  Date of Procedure: ?4.29.21  Procedure name: ?skin biopsy--punch  Procedure Diagnosis: ?skin lesion  Procedure in Detail: ?After consent obtained and time out performed, skin prepd with alcohol prep and locally anesthetized with 1% lidocaine with epinephrine. ?Lesion to the left upper extremity was then punched with 4mm punch bx and specimen sent to pathology for evaluation. ?Punch lesion the closed with 4-0 prolene. ?Patient tolerated procedure well and will be called with results and further treatment plan.  ?  Start Lexapro 20mg po daily  ?  ?  MD Kajal  ID Staff?  ?   Problem List/Past Medical History  Ongoing  Carpal tunnel syndrome  Cubital tunnel syndrome on right  Hepatitis C without hepatic coma(  Confirmed  )  HIV (Human Immunodeficiency Virus Infection)  Obesity  Tobacco user(  Probable Diagnosis  )  Vitamin D deficiency(  Confirmed  )  Historical  No qualifying data  Procedure/Surgical History  Excision of pilonidal cyst or sinus; extensive   Medications  EPINEPHrine 0.3 mg injectable kit, 0.3 mg= 1 EA, IM, Once,? ?Not Taking per Prescriber  ergocalciferol 50,000 intl units (1.25 mg) oral capsule, 74032 IntUnit= 1 cap(s), Oral, qWeek,? ?Not Taking, Completed Rx: needs refill  Escitalopram 10 Mg Tablet, 10 mg= 1 tab(s), Oral, Daily  Odefsey oral tablet, 1 tab(s), Oral, Daily, 3 refills  Zyrtec 10 mg oral tablet, 10 mg= 1 tab(s), Oral, Daily, 3 refills,? ?Still taking, not as prescribed: PRN; needs refill  Allergies  No Known Medication Allergies  shellfish?(swelling, hives, itching)  Social  History  Abuse/Neglect  No, No, Yes, 04/29/2021  Alcohol  Current, 1-2 times per week, 04/29/2021  Employment/School  Unemployed, 04/06/2015  Exercise  Exercise type: Walking., 04/06/2015  Home/Environment  Lives with Significant other. Living situation: Home/Independent. Alcohol abuse in household: No. Substance abuse in household: No. Smoker in household: Yes. Injuries/Abuse/Neglect in household: No. Feels unsafe at home: No. Safe place to go: Yes. Family/Friends available for support: Yes. Concern for family members at home: No. Major illness in household: No. Financial concerns: No. TV/Computer concerns: No., 04/06/2015  Nutrition/Health  Regular, 04/06/2015  Sexual  Gender Identity Identifies as male., 05/15/2019  Sexually active: No., 07/24/2018  Substance Use - Denies Substance Abuse, 04/06/2015  Never, 07/24/2018  Tobacco  4 or less cigarettes(less than 1/4 pack)/day in last 30 days, Cigarettes, No, 04/29/2021  Immunizations  Vaccine Date Status Comments   COVID-19, vector nr, [id number] - Miguel 03/23/2021 Recorded    influenza virus vaccine, inactivated 11/12/2020 Given    meningococcal conjugate vaccine 11/12/2020 Given    meningococcal conjugate vaccine 05/02/2019 Given    influenza virus vaccine, inactivated 10/23/2018 Given    tetanus/diphtheria/pertussis, acel(Tdap) 10/15/2018 Recorded    influenza virus vaccine, inactivated 11/09/2017 Given    influenza virus vaccine, inactivated 02/16/2017 Given    pneumococcal 23-polyvalent vaccine 10/20/2016 Given    influenza virus vaccine, inactivated 10/20/2016 Given    influenza virus vaccine, inactivated 02/22/2016 Given    tetanus/diphtheria/pertussis, acel(Tdap) 04/09/2015 Given Other :  armband would not scan   pneumococcal 13-valent conjugate vaccine 12/01/2014 Recorded    influenza virus vaccine, inactivated 12/01/2014 Recorded    influenza virus vaccine, inactivated 01/22/2014 Recorded    pneumococcal vacc 01/28/2013 Recorded    hepatitis A adult vaccine  09/05/2012 Recorded    hepatitis B vaccine 10/19/2011 Recorded    influenza virus vaccine, inactivated 10/19/2011 Recorded    Health Maintenance  Health Maintenance  ???Pending?(in the next year)  ??? ??OverDue  ??? ? ? ?ADL Screening due??05/02/20??and every 1??year(s)  ??? ? ? ?Smoking Cessation due??01/01/21??and every 1??year(s)  ??? ? ? ?Alcohol Misuse Screening due??01/02/21??and every 1??year(s)  ??? ??Due?  ??? ? ? ?Aspirin Therapy for CVD Prevention due??04/29/21??and every 1??year(s)  ??? ? ? ?Colorectal Screening due??04/29/21??Unknown Frequency  ??? ? ? ?Zoster Vaccine due??04/29/21??Unknown Frequency  ??? ??Due In Future?  ??? ? ? ?Influenza Vaccine not due until??10/01/21??and every 1??day(s)  ??? ? ? ?Blood Pressure Screening not due until??11/12/21??and every 1??year(s)  ??? ? ? ?Body Mass Index Check not due until??11/12/21??and every 1??year(s)  ??? ? ? ?Depression Screening not due until??11/12/21??and every 1??year(s)  ??? ? ? ?Obesity Screening not due until??01/01/22??and every 1??year(s)  ???Satisfied?(in the past 1 year)  ??? ??Satisfied?  ??? ? ? ?Blood Pressure Screening on??04/29/21.??Satisfied by Sandra Flower RN  ??? ? ? ?Body Mass Index Check on??04/29/21.??Satisfied by Sandra Flower RN.  ??? ? ? ?Depression Screening on??04/29/21.??Satisfied by Sandra Flower RN.  ??? ? ? ?Diabetes Screening on??02/26/21.??Satisfied by Miguelangel López  ??? ? ? ?Influenza Vaccine on??11/12/20.??Satisfied by Thalia Montaño  ??? ? ? ?Lipid Screening on??11/12/20.??Satisfied by Gail Gray  ??? ? ? ?Obesity Screening on??04/29/21.??Satisfied by Ching ARIAS, Sandra DEVLIN.  ?

## 2022-06-28 NOTE — PROGRESS NOTES
"Subjective:       Patient ID: Barry Hilton is a 51 y.o. male.    Chief Complaint: Follow-up (B20)    Mr Hilton is a 51 yr old BM MSM who presents for routine HIV visit.  He is on Odefsey which he states he tolerated well and rarely misses.  History of Hep C co-infection which was treated and cured.  Last sexually active about 6 months ago.  Sex always reported as protected.  Bottoms.  Pt with no current complaints.  Denies fever, chills, night sweats, rash, HA, vision changes, dizziness, CP/SOB, cough, N/V/D, abdominal pain, or urinary complaints.  Pt states is on a weekly vitamin D supplement.  Pt taking Lexapro daily with good results.  States really helps him.  No SI/HI/hallucinations.  Pt states RT axiallary mass / lymphadenopathy has resolved.  Smokes 1 - 1 1/2 ppd.  Drinks ETOH on weekends only; 1 pint mike.  No drug use reported.  Due for Twinrix #1 today to which pt is amendable.     12/15/2021 (per Dr SUGAR Grier)  Here today without any complaints for his HIV followup, tells me he is doing great on his ART (Odefsey) and missing no doses  Reports 100% compliance to same over the last month, but has missed a "few pills the months before that"   Denies fever/chills/night sweats/weight loss  No n/v/d/abd pain  No new rashes  No cp/sob  No ha/changes in vision    Sexually active occasionally, using condoms every time subjectively, exclusively the receptive partner in MSM encounters    Review of Systems   All other systems reviewed and are negative.        Objective:      Physical Exam  Vitals reviewed.   Constitutional:       General: He is awake. He is not in acute distress.     Appearance: Normal appearance.   HENT:      Head: Normocephalic and atraumatic.      Nose: Nose normal.      Mouth/Throat:      Mouth: Mucous membranes are moist.      Pharynx: Oropharynx is clear. No oropharyngeal exudate or posterior oropharyngeal erythema.      Comments: No thrush  Eyes:      Conjunctiva/sclera: Conjunctivae " normal.      Pupils: Pupils are equal, round, and reactive to light.   Neck:      Comments: No JVD noted  Cardiovascular:      Rate and Rhythm: Normal rate and regular rhythm.      Heart sounds: Normal heart sounds. No murmur heard.    No friction rub. No gallop.   Pulmonary:      Effort: Pulmonary effort is normal. No respiratory distress.      Breath sounds: Normal breath sounds. No wheezing.   Abdominal:      General: There is no distension.      Palpations: Abdomen is soft.      Tenderness: There is no abdominal tenderness.   Musculoskeletal:         General: No swelling or deformity. Normal range of motion.      Cervical back: Normal range of motion and neck supple.      Right lower leg: No edema.      Left lower leg: No edema.   Skin:     General: Skin is warm and dry.      Capillary Refill: Capillary refill takes less than 2 seconds.      Coloration: Skin is not jaundiced.      Findings: No rash.      Comments: RT axillary lymphadenopathy not palpated; no pain / tenderness; pt reports resolved   Neurological:      General: No focal deficit present.      Mental Status: He is alert and oriented to person, place, and time.   Psychiatric:         Mood and Affect: Mood normal.         Behavior: Behavior normal.         Assessment:       Problem List Items Addressed This Visit        ID    HIV disease - Primary    Relevant Medications    fubxtureot-eiwsgpbx-dmncrr ala (ODEFSEY) 200-25-25 mg Tab    Other Relevant Orders    CBC Auto Differential    CD4 Lymphocytes    Comprehensive Metabolic Panel    HIV-1 RNA, Quantitative, PCR with Reflex to Genotype    History of syphilis    Relevant Orders    SYPHILIS ANTIBODY (WITH REFLEX RPR)       Endocrine    Vitamin D deficiency    Relevant Medications    VITAMIN D2 1,250 mcg (50,000 unit) capsule    Other Relevant Orders    Vitamin D       GI    Hepatitis C virus infection cured after antiviral drug therapy    Relevant Orders    Hepatitis C RNA, Quantitative, PCR      Other  Visit Diagnoses     Routine screening for STI (sexually transmitted infection)        Relevant Orders    C.trach/N.gonor AMP RNA, (Non-Genital)    Chlamydia/GC, PCR    Chlamydia/GC, PCR    Hepatitis B Surface Antigen    Hepatitis C Antibody    Anxiety and depression        Relevant Medications    EScitalopram oxalate (LEXAPRO) 10 MG tablet    Need for vaccination        Relevant Medications    hepatitis A and B vaccine (PF) 720 SANDY unit- 20 mcg/mL suspension 720 Units (Completed)    hepatitis A and B vaccine (PF) 720 SANDY unit- 20 mcg/mL suspension 720 Units (Start on 8/29/2022  7:00 AM)          Plan:       HIV disease  -     qouuueikmv-tstqfwpx-eubeqq ala (ODEFSEY) 200-25-25 mg Tab; Take 1 tablet by mouth once daily at 6am.  Dispense: 30 tablet; Refill: 6  -     CBC Auto Differential; Future; Expected date: 06/29/2022  -     CD4 Lymphocytes  -     Comprehensive Metabolic Panel; Future; Expected date: 06/29/2022  -     HIV-1 RNA, Quantitative, PCR with Reflex to Genotype; Future; Expected date: 06/29/2022  Last CD4 994 (56.4) with VL undetectable  Continue Odefsey   Discussed HIV status at length to include need for 100% medication compliance and adherence, along with safe sex counseling performed.  Patient voiced understanding to both.  Will check labs today to include CD4, viral load, CBC and CMP.  Discussed importance of scheduled follow up as well.   RTC in 6 months with Khadijah FARAH    Hepatitis C virus infection cured after antiviral drug therapy  -     Hepatitis C RNA, Quantitative, PCR; Future; Expected date: 06/29/2022  Treated and cured  Last HCV VL undetectable 12/2021  Recheck labs today    Routine screening for STI (sexually transmitted infection)  -     C.trach/N.gonor AMP RNA, (Non-Genital)  -     Chlamydia/GC, PCR  -     Chlamydia/GC, PCR  -     Hepatitis B Surface Antigen; Future; Expected date: 06/29/2022  -     Hepatitis C Antibody; Future; Expected date: 06/29/2022  Due for GC/CT screening  (urine / oral / rectal), along with screening for syphilis and hepatitis    History of syphilis  -     SYPHILIS ANTIBODY (WITH REFLEX RPR)  Last RPR nonreactive (12/2021)  Repeat labs today    Vitamin D deficiency  -     VITAMIN D2 1,250 mcg (50,000 unit) capsule; Take 1 capsule (50,000 Units total) by mouth every 7 days.  Dispense: 4 capsule; Refill: 6  -     Vitamin D  Last Vitamin D level 17.1 (12/2021)  Continue Ergocalciferol  Recheck labs today    Anxiety and depression  -     EScitalopram oxalate (LEXAPRO) 10 MG tablet; Take 1 tablet (10 mg total) by mouth every evening.  Dispense: 30 tablet; Refill: 6  Doing well with medication  No SI/HI/hallucinations  Continue Escitalopram    Need for vaccination  -     hepatitis A and B vaccine (PF) 720 SANDY unit- 20 mcg/mL suspension 720 Units  -     hepatitis A and B vaccine (PF) 720 SANDY unit- 20 mcg/mL suspension 720 Units  Due for Twinrix vaccine #1 today  Will schedule Twinrix #2 in 2 months with nurse shot visit  Pt due for Shingrix #2 on an upcoming visit    Tobacco use  Smoking cessation encouraged  Pt not ready to quit

## 2022-06-29 ENCOUNTER — OFFICE VISIT (OUTPATIENT)
Dept: INFECTIOUS DISEASES | Facility: CLINIC | Age: 52
End: 2022-06-29
Payer: MEDICAID

## 2022-06-29 ENCOUNTER — TELEPHONE (OUTPATIENT)
Dept: INFECTIOUS DISEASES | Facility: CLINIC | Age: 52
End: 2022-06-29

## 2022-06-29 VITALS
WEIGHT: 191 LBS | TEMPERATURE: 99 F | HEART RATE: 77 BPM | DIASTOLIC BLOOD PRESSURE: 83 MMHG | RESPIRATION RATE: 18 BRPM | HEIGHT: 66 IN | SYSTOLIC BLOOD PRESSURE: 123 MMHG | BODY MASS INDEX: 30.7 KG/M2

## 2022-06-29 DIAGNOSIS — Z86.19 HISTORY OF SYPHILIS: ICD-10-CM

## 2022-06-29 DIAGNOSIS — Z23 NEED FOR VACCINATION: Primary | ICD-10-CM

## 2022-06-29 DIAGNOSIS — F32.A ANXIETY AND DEPRESSION: ICD-10-CM

## 2022-06-29 DIAGNOSIS — Z86.19 HEPATITIS C VIRUS INFECTION CURED AFTER ANTIVIRAL DRUG THERAPY: ICD-10-CM

## 2022-06-29 DIAGNOSIS — Z23 NEED FOR VACCINATION: ICD-10-CM

## 2022-06-29 DIAGNOSIS — Z11.3 ROUTINE SCREENING FOR STI (SEXUALLY TRANSMITTED INFECTION): ICD-10-CM

## 2022-06-29 DIAGNOSIS — E55.9 VITAMIN D DEFICIENCY: ICD-10-CM

## 2022-06-29 DIAGNOSIS — B20 HIV DISEASE: ICD-10-CM

## 2022-06-29 DIAGNOSIS — E83.52 HYPERCALCEMIA: ICD-10-CM

## 2022-06-29 DIAGNOSIS — F41.9 ANXIETY AND DEPRESSION: ICD-10-CM

## 2022-06-29 DIAGNOSIS — Z72.0 TOBACCO USE: ICD-10-CM

## 2022-06-29 DIAGNOSIS — B20 HIV DISEASE: Primary | ICD-10-CM

## 2022-06-29 LAB
ALBUMIN SERPL-MCNC: 3.9 GM/DL (ref 3.5–5)
ALBUMIN/GLOB SERPL: 0.9 RATIO (ref 1.1–2)
ALP SERPL-CCNC: 58 UNIT/L (ref 40–150)
ALT SERPL-CCNC: 14 UNIT/L (ref 0–55)
AST SERPL-CCNC: 17 UNIT/L (ref 5–34)
BASOPHILS # BLD AUTO: 0.02 X10(3)/MCL (ref 0–0.2)
BASOPHILS NFR BLD AUTO: 0.5 %
BILIRUBIN DIRECT+TOT PNL SERPL-MCNC: 0.4 MG/DL
BUN SERPL-MCNC: 16.2 MG/DL (ref 8.4–25.7)
C TRACH DNA SPEC QL NAA+PROBE: NOT DETECTED
C TRACH DNA SPEC QL NAA+PROBE: NOT DETECTED
CALCIUM SERPL-MCNC: 10.5 MG/DL (ref 8.4–10.2)
CHLORIDE SERPL-SCNC: 107 MMOL/L (ref 98–107)
CO2 SERPL-SCNC: 24 MMOL/L (ref 22–29)
CREAT SERPL-MCNC: 0.75 MG/DL (ref 0.73–1.18)
DEPRECATED CALCIDIOL+CALCIFEROL SERPL-MC: 27.5 NG/ML (ref 30–80)
EOSINOPHIL # BLD AUTO: 0.09 X10(3)/MCL (ref 0–0.9)
EOSINOPHIL NFR BLD AUTO: 2.1 %
ERYTHROCYTE [DISTWIDTH] IN BLOOD BY AUTOMATED COUNT: 11.6 % (ref 11.5–17)
GLOBULIN SER-MCNC: 4.2 GM/DL (ref 2.4–3.5)
GLUCOSE SERPL-MCNC: 102 MG/DL (ref 74–100)
HBV SURFACE AG SERPL QL IA: NONREACTIVE
HCT VFR BLD AUTO: 42.2 % (ref 42–52)
HCV AB SERPL QL IA: REACTIVE
HGB BLD-MCNC: 14.3 GM/DL (ref 14–18)
IMM GRANULOCYTES # BLD AUTO: 0.01 X10(3)/MCL (ref 0–0.02)
IMM GRANULOCYTES NFR BLD AUTO: 0.2 % (ref 0–0.43)
LYMPHOCYTES # BLD AUTO: 1.78 X10(3)/MCL (ref 0.6–4.6)
LYMPHOCYTES NFR BLD AUTO: 40.9 %
MCH RBC QN AUTO: 34.3 PG (ref 27–31)
MCHC RBC AUTO-ENTMCNC: 33.9 MG/DL (ref 33–36)
MCV RBC AUTO: 101.2 FL (ref 80–94)
MONOCYTES # BLD AUTO: 0.56 X10(3)/MCL (ref 0.1–1.3)
MONOCYTES NFR BLD AUTO: 12.9 %
N GONORRHOEA DNA SPEC QL NAA+PROBE: NOT DETECTED
N GONORRHOEA DNA SPEC QL NAA+PROBE: NOT DETECTED
NEUTROPHILS # BLD AUTO: 1.9 X10(3)/MCL (ref 2.1–9.2)
NEUTROPHILS NFR BLD AUTO: 43.4 %
NRBC BLD AUTO-RTO: 0 %
PLATELET # BLD AUTO: 212 X10(3)/MCL (ref 130–400)
PMV BLD AUTO: 10.7 FL (ref 9.4–12.4)
POTASSIUM SERPL-SCNC: 3.8 MMOL/L (ref 3.5–5.1)
PROT SERPL-MCNC: 8.1 GM/DL (ref 6.4–8.3)
RBC # BLD AUTO: 4.17 X10(6)/MCL (ref 4.7–6.1)
RPR SER QL: ABNORMAL
RPR SER QL: REACTIVE
RPR SER-TITR: ABNORMAL {TITER}
SODIUM SERPL-SCNC: 137 MMOL/L (ref 136–145)
T PALLIDUM AB SER QL: REACTIVE
WBC # SPEC AUTO: 4.4 X10(3)/MCL (ref 4.5–11.5)

## 2022-06-29 PROCEDURE — 99214 OFFICE O/P EST MOD 30 MIN: CPT | Mod: PBBFAC | Performed by: NURSE PRACTITIONER

## 2022-06-29 PROCEDURE — 36415 COLL VENOUS BLD VENIPUNCTURE: CPT

## 2022-06-29 PROCEDURE — 86361 T CELL ABSOLUTE COUNT: CPT | Performed by: NURSE PRACTITIONER

## 2022-06-29 PROCEDURE — 86592 SYPHILIS TEST NON-TREP QUAL: CPT | Performed by: NURSE PRACTITIONER

## 2022-06-29 PROCEDURE — 99215 PR OFFICE/OUTPT VISIT, EST, LEVL V, 40-54 MIN: ICD-10-PCS | Mod: S$PBB,,, | Performed by: NURSE PRACTITIONER

## 2022-06-29 PROCEDURE — 87522 HEPATITIS C REVRS TRNSCRPJ: CPT

## 2022-06-29 PROCEDURE — 87591 N.GONORRHOEAE DNA AMP PROB: CPT | Mod: 91 | Performed by: NURSE PRACTITIONER

## 2022-06-29 PROCEDURE — 90636 HEP A/HEP B VACC ADULT IM: CPT | Mod: PBBFAC

## 2022-06-29 PROCEDURE — 80053 COMPREHEN METABOLIC PANEL: CPT

## 2022-06-29 PROCEDURE — 87491 CHLMYD TRACH DNA AMP PROBE: CPT | Performed by: NURSE PRACTITIONER

## 2022-06-29 PROCEDURE — 86780 TREPONEMA PALLIDUM: CPT | Performed by: NURSE PRACTITIONER

## 2022-06-29 PROCEDURE — 99215 OFFICE O/P EST HI 40 MIN: CPT | Mod: S$PBB,,, | Performed by: NURSE PRACTITIONER

## 2022-06-29 PROCEDURE — 82306 VITAMIN D 25 HYDROXY: CPT | Performed by: NURSE PRACTITIONER

## 2022-06-29 PROCEDURE — 90471 IMMUNIZATION ADMIN: CPT | Mod: PBBFAC

## 2022-06-29 RX ORDER — ERGOCALCIFEROL 1.25 MG/1
50000 CAPSULE ORAL
COMMUNITY
Start: 2022-06-18 | End: 2022-06-29 | Stop reason: SDUPTHER

## 2022-06-29 RX ORDER — ESCITALOPRAM OXALATE 10 MG/1
10 TABLET ORAL
COMMUNITY
Start: 2021-12-15 | End: 2022-06-29 | Stop reason: SDUPTHER

## 2022-06-29 RX ORDER — ESCITALOPRAM OXALATE 10 MG/1
10 TABLET ORAL NIGHTLY
Qty: 30 TABLET | Refills: 6 | Status: SHIPPED | OUTPATIENT
Start: 2022-06-29 | End: 2022-07-29

## 2022-06-29 RX ORDER — EMTRICITABINE, RILPIVIRINE HYDROCHLORIDE, AND TENOFOVIR ALAFENAMIDE 200; 25; 25 MG/1; MG/1; MG/1
1 TABLET ORAL DAILY
Qty: 30 TABLET | Refills: 6 | Status: SHIPPED | OUTPATIENT
Start: 2022-06-29 | End: 2022-07-29

## 2022-06-29 RX ORDER — EMTRICITABINE, RILPIVIRINE HYDROCHLORIDE, AND TENOFOVIR ALAFENAMIDE 200; 25; 25 MG/1; MG/1; MG/1
TABLET ORAL
COMMUNITY
Start: 2021-12-15 | End: 2022-06-29 | Stop reason: SDUPTHER

## 2022-06-29 RX ORDER — ERGOCALCIFEROL 1.25 MG/1
50000 CAPSULE ORAL
Qty: 4 CAPSULE | Refills: 6 | Status: SHIPPED | OUTPATIENT
Start: 2022-06-29 | End: 2022-07-29

## 2022-06-29 RX ORDER — CETIRIZINE HYDROCHLORIDE 10 MG/1
10 TABLET ORAL DAILY
COMMUNITY
Start: 2022-05-02 | End: 2023-01-09 | Stop reason: SDUPTHER

## 2022-06-29 RX ADMIN — HEPATITIS A AND HEPATITIS B (RECOMBINANT) VACCINE 720 UNITS: 720; 20 INJECTION, SUSPENSION INTRAMUSCULAR at 10:06

## 2022-06-29 NOTE — PROGRESS NOTES
Reviewing labs.  Noted with elevated Ca at 10.5.  Please call pt and ask him to increase water intake, avoid additional Ca supplements (TUMS, Ca containing vitamins, etc), excess dietary Ca, etc.  Will plan to repeat lab in 2 weeks; lab ordered

## 2022-06-29 NOTE — TELEPHONE ENCOUNTER
----- Message from GABRIELA Buchanan sent at 6/29/2022  1:38 PM CDT -----  Reviewing labs.  Noted with elevated Ca at 10.5.  Please call pt and ask him to increase water intake, avoid additional Ca supplements (TUMS, Ca containing vitamins, etc), excess dietary Ca, etc.  Will plan to repeat lab in 2 weeks; lab ordered

## 2022-06-29 NOTE — TELEPHONE ENCOUNTER
Chief Complaint   Patient presents with     New Patient     New patient visit for rash on hips     Vicky Simons, CMA     Order for 2nd vaccine. Initial vaccine given 6/29/22.

## 2022-06-30 ENCOUNTER — TELEPHONE (OUTPATIENT)
Dept: INFECTIOUS DISEASES | Facility: CLINIC | Age: 52
End: 2022-06-30
Payer: MEDICAID

## 2022-06-30 ENCOUNTER — CLINICAL SUPPORT (OUTPATIENT)
Dept: INFECTIOUS DISEASES | Facility: CLINIC | Age: 52
End: 2022-06-30
Payer: MEDICAID

## 2022-06-30 DIAGNOSIS — A53.9 SYPHILIS: Primary | ICD-10-CM

## 2022-06-30 DIAGNOSIS — Z86.19 HISTORY OF SYPHILIS: Primary | ICD-10-CM

## 2022-06-30 LAB
AGE: 51
CD3+CD4+ CELLS # BLD: 41 % (ref 28–48)
CD3+CD4+ CELLS # SPEC: 1012.04 UNIT/L (ref 589–1505)
CD3+CD4+ CELLS NFR BLD: 56.1 %
LYMPHOCYTES # BLD AUTO: 1804 X10(3)/MCL (ref 1260–5520)
LYMPHOMA - T-CELL MARKERS SPEC-IMP: ABNORMAL
WBC # BLD AUTO: 4400 /MM3 (ref 4500–11500)

## 2022-06-30 PROCEDURE — 96372 THER/PROPH/DIAG INJ SC/IM: CPT | Mod: PBBFAC

## 2022-06-30 RX ADMIN — PENICILLIN G BENZATHINE 2.4 MILLION UNITS: 1200000 INJECTION, SUSPENSION INTRAMUSCULAR at 02:06

## 2022-06-30 NOTE — PROGRESS NOTES
Reviewed labs.  RPR nonreactive from 12/2021.  Now he had reactive titer at 0 dils.  Called and discussed with pt and cannot ensure it cannot be reinfection or just concern from lab read.  Pt desires treatment.  He will need Bicillin 2.4 million units IM x 1.  Pt states he is on his way to come get injection now.  Please schedule for nurse visit.

## 2022-06-30 NOTE — TELEPHONE ENCOUNTER
----- Message from GABRIELA Buchanan sent at 6/30/2022  1:38 PM CDT -----  Reviewed labs.  RPR nonreactive from 12/2021.  Now he had reactive titer at 0 dils.  Called and discussed with pt and cannot ensure it cannot be reinfection or just concern from lab read.  Pt desires treatment.  He will need Bicillin 2.4 million units IM x 1.  Pt states he is on his way to come get injection now.  Please schedule for nurse visit.

## 2022-06-30 NOTE — NURSING
Patient given a total of Bicillin 2.4 million units. 1.2 in left gluteal and 1.2 in right gluteal. Patient observed for 15 minutes for reactions, none noted. Patient tolerated well.

## 2022-07-01 LAB
HCV RNA SERPL NAA+PROBE-ACNC: NORMAL IU/ML
HIV1 RNA # PLAS NAA DL=20: NORMAL COPIES/ML

## 2022-07-02 LAB
C TRACH RRNA SPEC QL NAA+PROBE: NEGATIVE
N GONORRHOEA RRNA SPEC QL NAA+PROBE: NEGATIVE
SPECIMEN SOURCE: NORMAL
SPECIMEN SOURCE: NORMAL

## 2022-10-21 ENCOUNTER — TELEPHONE (OUTPATIENT)
Dept: INFECTIOUS DISEASES | Facility: CLINIC | Age: 52
End: 2022-10-21
Payer: MEDICARE

## 2022-10-21 NOTE — TELEPHONE ENCOUNTER
It seems likely his symptoms would be more related to his smoking. Encourage smoking cessation. This is something he needs to address further with his PCP though.  Please advise him of this.  Thank you

## 2022-10-21 NOTE — TELEPHONE ENCOUNTER
Spoke with patient. He states it hard breathing for long periods of time in the machine especially when he is not feeling well. He stated he as told to get a letter stating some of the side effects from his medication can cause fatigue and weakness. Please advise

## 2022-10-21 NOTE — TELEPHONE ENCOUNTER
----- Message from Joe Lowery sent at 10/21/2022 10:27 AM CDT -----  Regarding: Patient Called  #Khadijah#    Patient called wanting to know if Khadijah can send a letter to [employer] informing them of his health issues? He states that his pump needs to be turned down and hey will not do so unless they receive a litter. [phone]    Patient ID #- [id number]  Fax:[phone] - [phone]

## 2022-10-25 NOTE — TELEPHONE ENCOUNTER
Called and spoke with patient, advised him to contact his PCP to see if they would be able to help him. Patient was appreciative and understanding. He verbalized understanding.

## 2022-11-01 ENCOUNTER — OFFICE VISIT (OUTPATIENT)
Dept: URGENT CARE | Facility: CLINIC | Age: 52
End: 2022-11-01
Payer: MEDICARE

## 2022-11-01 VITALS
SYSTOLIC BLOOD PRESSURE: 146 MMHG | WEIGHT: 191.88 LBS | DIASTOLIC BLOOD PRESSURE: 87 MMHG | OXYGEN SATURATION: 100 % | TEMPERATURE: 98 F | HEIGHT: 67 IN | BODY MASS INDEX: 30.12 KG/M2 | HEART RATE: 74 BPM | RESPIRATION RATE: 18 BRPM

## 2022-11-01 DIAGNOSIS — Z11.52 ENCOUNTER FOR SCREENING FOR SEVERE ACUTE RESPIRATORY SYNDROME CORONAVIRUS 2 (SARS-COV-2) INFECTION: Primary | ICD-10-CM

## 2022-11-01 DIAGNOSIS — R68.89 FLU-LIKE SYMPTOMS: ICD-10-CM

## 2022-11-01 DIAGNOSIS — R09.81 NASAL CONGESTION: ICD-10-CM

## 2022-11-01 LAB
CTP QC/QA: YES
CTP QC/QA: YES
FLUAV AG NPH QL: NEGATIVE
FLUBV AG NPH QL: NEGATIVE
SARS-COV-2 RDRP RESP QL NAA+PROBE: NEGATIVE

## 2022-11-01 PROCEDURE — 87804 INFLUENZA ASSAY W/OPTIC: CPT | Mod: 59,PBBFAC

## 2022-11-01 PROCEDURE — 87635 SARS-COV-2 COVID-19 AMP PRB: CPT | Mod: PBBFAC

## 2022-11-01 PROCEDURE — 99213 PR OFFICE/OUTPT VISIT, EST, LEVL III, 20-29 MIN: ICD-10-PCS | Mod: S$PBB,,,

## 2022-11-01 PROCEDURE — 99213 OFFICE O/P EST LOW 20 MIN: CPT | Mod: S$PBB,,,

## 2022-11-01 PROCEDURE — 99214 OFFICE O/P EST MOD 30 MIN: CPT | Mod: PBBFAC

## 2022-11-01 RX ORDER — ERGOCALCIFEROL 1.25 MG/1
50000 CAPSULE ORAL
COMMUNITY
Start: 2022-09-26 | End: 2023-01-10 | Stop reason: SDUPTHER

## 2022-11-01 RX ORDER — EMTRICITABINE, RILPIVIRINE HYDROCHLORIDE, AND TENOFOVIR ALAFENAMIDE 200; 25; 25 MG/1; MG/1; MG/1
1 TABLET ORAL DAILY
COMMUNITY
Start: 2022-10-29 | End: 2023-01-09 | Stop reason: SDUPTHER

## 2022-11-01 RX ORDER — FLUTICASONE PROPIONATE 50 MCG
2 SPRAY, SUSPENSION (ML) NASAL DAILY
Qty: 18.2 ML | Refills: 0 | Status: SHIPPED | OUTPATIENT
Start: 2022-11-01

## 2022-11-01 RX ORDER — LORATADINE 10 MG/1
10 TABLET ORAL DAILY
Qty: 30 TABLET | Refills: 0 | Status: SHIPPED | OUTPATIENT
Start: 2022-11-01 | End: 2023-01-09 | Stop reason: SDUPTHER

## 2022-11-01 NOTE — PROGRESS NOTES
"Subjective:       Patient ID: Barry Hilton is a 52 y.o. male.    Vitals:  height is 5' 7" (1.702 m) and weight is 87 kg (191 lb 14.4 oz). His oral temperature is 98.1 °F (36.7 °C). His blood pressure is 146/87 (abnormal) and his pulse is 74. His respiration is 18 and oxygen saturation is 100%.     Chief Complaint: Diarrhea (X 10/9/22. Off and on) and Nasal Congestion (X 10/9/22. )    Pt states intermittent diarrhea and nasal congestion for one month. Pt states ID informed him that diarrhea is a common side effect of his medication. Pt not currently taking his antihistamine. Denies sick contact or fever.    Diarrhea       Constitution: Negative.   HENT:  Positive for congestion.    Neck: neck negative.   Cardiovascular: Negative.    Eyes: Negative.    Respiratory: Negative.     Gastrointestinal:  Positive for diarrhea.   Genitourinary: Negative.    Musculoskeletal: Negative.    Skin: Negative.    Allergic/Immunologic: Negative.    Neurological: Negative.      Objective:      Physical Exam   Constitutional: He is oriented to person, place, and time. normal  HENT:   Head: Normocephalic.   Ears:   Right Ear: impacted cerumen  Left Ear: impacted cerumen  Nose: Congestion present.   Mouth/Throat: Uvula is midline, oropharynx is clear and moist and mucous membranes are normal. Mucous membranes are moist. Oropharynx is clear.   Eyes: Pupils are equal, round, and reactive to light.   Neck: Neck supple.   Cardiovascular: Normal rate, regular rhythm, normal heart sounds and normal pulses.   Pulmonary/Chest: Effort normal and breath sounds normal.   Abdominal: Normal appearance. Soft.   Musculoskeletal: Normal range of motion.         General: Normal range of motion.   Neurological: He is alert and oriented to person, place, and time.   Skin: Skin is warm and dry.   Vitals reviewed.      Assessment:       1. Encounter for screening for severe acute respiratory syndrome coronavirus 2 (SARS-CoV-2) infection    2. Flu-like " symptoms    3. Nasal congestion            Plan:         Encounter for screening for severe acute respiratory syndrome coronavirus 2 (SARS-CoV-2) infection  -     POCT COVID-19 Rapid Screening    Flu-like symptoms  -     POCT Influenza A/B    Nasal congestion  -     fluticasone propionate (FLONASE) 50 mcg/actuation nasal spray; 2 sprays (100 mcg total) by Each Nostril route once daily.  Dispense: 18.2 mL; Refill: 0  -     loratadine (CLARITIN) 10 mg tablet; Take 1 tablet (10 mg total) by mouth once daily.  Dispense: 30 tablet; Refill: 0    - OTC cold/flu products as desired for symptoms  - Plenty of fluids  - Home from work/school  - Tylenol or Motrin for pain/fever    ER precautions given, pt verbalized understanding.     Please see provided patient education for guidance.

## 2022-11-01 NOTE — LETTER
November 1, 2022      [facility]  [facility] [facility] [address]  Phone: [phone]       Patient: aBrry Hilton   YOB: 1970  Date of Visit: 11/01/2022    To Whom It May Concern:    Sherri Hilton  was at [facility] on 11/01/2022. The patient may return to work/school on 11/3/22 with no restrictions. If you have any questions or concerns, or if I can be of further assistance, please do not hesitate to contact me.    Sincerely,    GABRIELA Gambino

## 2023-01-06 NOTE — PROGRESS NOTES
Subjective:       Patient ID: Barry Hilton is a 52 y.o. male.    Chief Complaint: Follow-up (B20)    Mr Reddy is a 52 yr old MSM  who presents for routine HIV visit.  He remains on Odefsey in which he is very well controlled. He reports most often compliance with ART and well tolerance. Last CD4 1012 (56.1%) with viral suppression. He has a history of hepatitis C co-infection in which he was treated and cured. Last hepatitis C viral load was undetectable. Pt reports no new exposures or need for additional screening at this time. Denies sexual activity or need for STI screening today. Bottoms. Pt would like to defer anal pap until next visit. History noted of syphilis in which he had a reactive RPR at 0 dils on last visit and was treated with Bicillin 2.4 million units IM x 1 on 6/30/22. He reports no current complaints. Denies fever, chills, night sweats, rash, HA, vision changes, dizziness, CP/SOB, cough, N/V/D, abdominal pain, or urinary complaints.  On weekly vitamin D supplement. Last eye exam about 4-5 years ago with Dr Barrett. Pt denies prior history of colonoscopy. Agrees to screening with cologuard. Smokes 1 ppd. Drinks 1 pint mike on weekends only. Denies history of illicit drug use. Pt with no immunity to Hep A and Hep B. He did not complete previous Twinrix series.  He is amendable to restarting series again today.     6/29/22  Mr Hilton is a 51 yr old  MSM who presents for routine HIV visit.  He is on Odefsey which he states he tolerated well and rarely misses.  History of Hep C co-infection which was treated and cured.  Last sexually active about 6 months ago.  Sex always reported as protected.  Bottoms.  Pt with no current complaints.  Denies fever, chills, night sweats, rash, HA, vision changes, dizziness, CP/SOB, cough, N/V/D, abdominal pain, or urinary complaints.  Pt states is on a weekly vitamin D supplement.  Pt taking Lexapro daily with good results.  States really helps him.  No  "SI/HI/hallucinations.  Pt states RT axiallary mass / lymphadenopathy has resolved.  Smokes 1 - 1 1/2 ppd.  Drinks ETOH on weekends only; 1 pint mike.  No drug use reported.  Due for Twinrix #1 today to which pt is amendable.      12/15/2021 (per Dr SUGAR Grier)  Here today without any complaints for his HIV followup, tells me he is doing great on his ART (Odefsey) and missing no doses  Reports 100% compliance to same over the last month, but has missed a "few pills the months before that"   Denies fever/chills/night sweats/weight loss  No n/v/d/abd pain  No new rashes  No cp/sob  No ha/changes in vision    Sexually active occasionally, using condoms every time subjectively, exclusively the receptive partner in MSM encounters    Review of Systems   Constitutional:  Negative for appetite change, chills, diaphoresis, fatigue and fever.   HENT: Negative.     Eyes: Negative.    Respiratory:  Negative for cough, chest tightness, shortness of breath and wheezing.    Cardiovascular:  Negative for chest pain and palpitations.   Gastrointestinal:  Negative for abdominal pain, constipation, diarrhea, nausea and vomiting.   Endocrine: Negative.    Genitourinary:  Negative for discharge, dysuria, genital sores and urgency.   Musculoskeletal: Negative.    Integumentary:  Negative for rash and wound.   Allergic/Immunologic: Negative.    Neurological:  Negative for dizziness, seizures, weakness, light-headedness and headaches.   Hematological: Negative.    Psychiatric/Behavioral: Negative.         Objective:      Physical Exam  Vitals reviewed.   Constitutional:       General: He is awake. He is not in acute distress.     Appearance: Normal appearance. He is normal weight.   HENT:      Head: Normocephalic and atraumatic.      Nose: Nose normal.      Mouth/Throat:      Mouth: Mucous membranes are moist.      Pharynx: Oropharynx is clear. No oropharyngeal exudate or posterior oropharyngeal erythema.      Comments: No thrush  Eyes:      " Conjunctiva/sclera: Conjunctivae normal.      Pupils: Pupils are equal, round, and reactive to light.   Neck:      Comments: No JVD noted  Cardiovascular:      Rate and Rhythm: Normal rate and regular rhythm.      Heart sounds: Normal heart sounds. No murmur heard.    No friction rub. No gallop.   Pulmonary:      Effort: Pulmonary effort is normal. No respiratory distress.      Breath sounds: Normal breath sounds. No wheezing.   Abdominal:      General: Abdomen is flat. There is no distension.      Palpations: Abdomen is soft. There is no mass.      Tenderness: There is no abdominal tenderness. There is no guarding or rebound.      Hernia: No hernia is present.   Musculoskeletal:         General: No swelling or deformity. Normal range of motion.      Cervical back: Normal range of motion and neck supple.      Right lower leg: No edema.      Left lower leg: No edema.   Skin:     General: Skin is warm and dry.      Capillary Refill: Capillary refill takes less than 2 seconds.      Coloration: Skin is not jaundiced.      Findings: No rash.   Neurological:      General: No focal deficit present.      Mental Status: He is alert and oriented to person, place, and time.   Psychiatric:         Mood and Affect: Mood normal.         Behavior: Behavior normal.       Assessment:       Problem List Items Addressed This Visit          ID    HIV disease - Primary    Relevant Medications    ODEFSEY 200-25-25 mg Tab    Other Relevant Orders    CBC Auto Differential (Completed)    CD4 Lymphocytes    Comprehensive Metabolic Panel (Completed)    HIV-1 RNA, Quantitative, PCR with Reflex to Genotype    Quantiferon Gold TB    Ambulatory referral/consult to Ophthalmology    History of syphilis    Relevant Orders    SYPHILIS ANTIBODY (WITH REFLEX RPR) (Completed)    RPR       Endocrine    Vitamin D deficiency    Relevant Orders    Vitamin D (Completed)       GI    Hepatitis C virus infection cured after antiviral drug therapy       Other     Tobacco use    Allergies    Relevant Medications    cetirizine (ZYRTEC) 10 MG tablet     Other Visit Diagnoses       Routine screening for STI (sexually transmitted infection)        Relevant Orders    SYPHILIS ANTIBODY (WITH REFLEX RPR) (Completed)    RPR    Cancer screening        Health care maintenance        Relevant Orders    Cologuard Screening (Multitarget Stool DNA)    Need for vaccination        Relevant Orders    Influenza - Quadrivalent *Preferred* (6 months+) (PF) (Completed)    (In Office Administered) Hepatitis A / Hepatitis B Combined Vaccine (IM) (Completed)              Plan:       HIV disease  -     ODEFSEY 200-25-25 mg Tab; Take 1 tablet by mouth once daily.  Dispense: 30 tablet; Refill: 4  -     CBC Auto Differential; Future; Expected date: 01/09/2023  -     CD4 Lymphocytes  -     Comprehensive Metabolic Panel; Future; Expected date: 01/09/2023  -     HIV-1 RNA, Quantitative, PCR with Reflex to Genotype; Future; Expected date: 01/09/2023  -     Quantiferon Gold TB  -     Ambulatory referral/consult to Ophthalmology; Future; Expected date: 01/16/2023  Last CD4 1012 (56.1%) with viral suppression.  Reviewed labs in detail with pt  Repeat labs today  Continue Odefsey  Discussed HIV status at length to include need for 100% medication compliance and adherence, along with safe sex counseling performed.  Patient voiced understanding to both.  Will check labs today to include CD4, viral load, CBC and CMP.  Discussed importance of scheduled follow up as well.   Refer to Ophthalmology services d/t HIV disease / fundus photo  RTC in 4 months with Khadijah FARAH    Hepatitis C virus infection cured after antiviral drug therapy  Treated and cured  Last HCV VL undetectable   Pt denies need for additional screening today. Denies potential exposures    Routine screening for STI (sexually transmitted infection)  -     SYPHILIS ANTIBODY (WITH REFLEX RPR)  -     RPR  Due screening for syphilis     History of  syphilis  -     SYPHILIS ANTIBODY (WITH REFLEX RPR)  -     RPR  RPR reactive at 0 dils from 6/29/22 <--- RPR nonreactive from 12/2021  Treated with Bicillin 2.4 million units IM on 6/30/22  Repeat labs today    Vitamin D deficiency  -     Vitamin D  Last level 27.5  Currently on weekly supplement  Recheck lab today  Depending on results will determine need / strength of supplement    Allergy, subsequent encounter  -     cetirizine (ZYRTEC) 10 MG tablet; Take 1 tablet (10 mg total) by mouth every evening.  Dispense: 30 tablet; Refill: 4  Requesting refill     Cancer screening  Pt requests to defer anal pap until next visit    Health care maintenance  -     Cologuard Screening (Multitarget Stool DNA); Future; Expected date: 01/09/2023  Pt denies history of colon CA (self / family), denies history of adenomas, denies history of IBS, denies blood in stool, etc  Pt agrees to cologuard screening    Tobacco use  Smoking cessation encouraged    Need for vaccination  -     Influenza - Quadrivalent *Preferred* (6 months+) (PF)  -     (In Office Administered) Hepatitis A / Hepatitis B Combined Vaccine (IM)  Due for flu vaccine and Twinrix #1 today      I spent a total of 31 minutes on the day of the visit.This includes face to face time and non-face to face time preparing to see the patient (eg, review of tests), obtaining and/or reviewing separately obtained history, documenting clinical information in the electronic or other health record, independently interpreting results and communicating results to the patient/family/caregiver, or care coordinator.

## 2023-01-09 ENCOUNTER — OFFICE VISIT (OUTPATIENT)
Dept: INFECTIOUS DISEASES | Facility: CLINIC | Age: 53
End: 2023-01-09
Payer: MEDICARE

## 2023-01-09 VITALS
WEIGHT: 185.81 LBS | SYSTOLIC BLOOD PRESSURE: 130 MMHG | BODY MASS INDEX: 29.86 KG/M2 | OXYGEN SATURATION: 100 % | DIASTOLIC BLOOD PRESSURE: 86 MMHG | TEMPERATURE: 99 F | HEIGHT: 66 IN | RESPIRATION RATE: 18 BRPM | HEART RATE: 82 BPM

## 2023-01-09 DIAGNOSIS — E55.9 VITAMIN D DEFICIENCY: ICD-10-CM

## 2023-01-09 DIAGNOSIS — Z11.3 ROUTINE SCREENING FOR STI (SEXUALLY TRANSMITTED INFECTION): ICD-10-CM

## 2023-01-09 DIAGNOSIS — T78.40XD ALLERGY, SUBSEQUENT ENCOUNTER: ICD-10-CM

## 2023-01-09 DIAGNOSIS — Z86.19 HISTORY OF SYPHILIS: ICD-10-CM

## 2023-01-09 DIAGNOSIS — Z72.0 TOBACCO USE: ICD-10-CM

## 2023-01-09 DIAGNOSIS — Z23 NEED FOR VACCINATION: ICD-10-CM

## 2023-01-09 DIAGNOSIS — B20 HIV DISEASE: Primary | ICD-10-CM

## 2023-01-09 DIAGNOSIS — Z86.19 HEPATITIS C VIRUS INFECTION CURED AFTER ANTIVIRAL DRUG THERAPY: ICD-10-CM

## 2023-01-09 DIAGNOSIS — Z12.9 CANCER SCREENING: ICD-10-CM

## 2023-01-09 DIAGNOSIS — Z00.00 HEALTH CARE MAINTENANCE: ICD-10-CM

## 2023-01-09 PROBLEM — T78.40XA ALLERGIES: Status: ACTIVE | Noted: 2023-01-09

## 2023-01-09 LAB
ALBUMIN SERPL-MCNC: 4.1 G/DL (ref 3.5–5)
ALBUMIN/GLOB SERPL: 1.1 RATIO (ref 1.1–2)
ALP SERPL-CCNC: 64 UNIT/L (ref 40–150)
ALT SERPL-CCNC: 39 UNIT/L (ref 0–55)
AST SERPL-CCNC: 33 UNIT/L (ref 5–34)
BASOPHILS # BLD AUTO: 0.03 X10(3)/MCL (ref 0–0.2)
BASOPHILS NFR BLD AUTO: 0.6 %
BILIRUBIN DIRECT+TOT PNL SERPL-MCNC: 0.6 MG/DL
BUN SERPL-MCNC: 9.7 MG/DL (ref 8.4–25.7)
CALCIUM SERPL-MCNC: 9.4 MG/DL (ref 8.4–10.2)
CHLORIDE SERPL-SCNC: 105 MMOL/L (ref 98–107)
CO2 SERPL-SCNC: 24 MMOL/L (ref 22–29)
CREAT SERPL-MCNC: 0.79 MG/DL (ref 0.73–1.18)
DEPRECATED CALCIDIOL+CALCIFEROL SERPL-MC: 24.1 NG/ML (ref 30–80)
EOSINOPHIL # BLD AUTO: 0.16 X10(3)/MCL (ref 0–0.9)
EOSINOPHIL NFR BLD AUTO: 3.1 %
ERYTHROCYTE [DISTWIDTH] IN BLOOD BY AUTOMATED COUNT: 12.2 % (ref 11.5–17)
GFR SERPLBLD CREATININE-BSD FMLA CKD-EPI: >60 MLS/MIN/1.73/M2
GLOBULIN SER-MCNC: 3.9 GM/DL (ref 2.4–3.5)
GLUCOSE SERPL-MCNC: 90 MG/DL (ref 74–100)
HCT VFR BLD AUTO: 42.4 % (ref 42–52)
HGB BLD-MCNC: 14.8 GM/DL (ref 14–18)
IMM GRANULOCYTES # BLD AUTO: 0 X10(3)/MCL (ref 0–0.04)
IMM GRANULOCYTES NFR BLD AUTO: 0 %
LYMPHOCYTES # BLD AUTO: 1.95 X10(3)/MCL (ref 0.6–4.6)
LYMPHOCYTES NFR BLD AUTO: 38.2 %
MCH RBC QN AUTO: 35.6 PG
MCHC RBC AUTO-ENTMCNC: 34.9 MG/DL (ref 33–36)
MCV RBC AUTO: 101.9 FL (ref 80–94)
MONOCYTES # BLD AUTO: 0.56 X10(3)/MCL (ref 0.1–1.3)
MONOCYTES NFR BLD AUTO: 11 %
NEUTROPHILS # BLD AUTO: 2.4 X10(3)/MCL (ref 2.1–9.2)
NEUTROPHILS NFR BLD AUTO: 47.1 %
NRBC BLD AUTO-RTO: 0 %
PLATELET # BLD AUTO: 210 X10(3)/MCL (ref 130–400)
PMV BLD AUTO: 10.9 FL (ref 7.4–10.4)
POTASSIUM SERPL-SCNC: 3.8 MMOL/L (ref 3.5–5.1)
PROT SERPL-MCNC: 8 GM/DL (ref 6.4–8.3)
RBC # BLD AUTO: 4.16 X10(6)/MCL (ref 4.7–6.1)
SODIUM SERPL-SCNC: 135 MMOL/L (ref 136–145)
T PALLIDUM AB SER QL: REACTIVE
WBC # SPEC AUTO: 5.1 X10(3)/MCL (ref 4.5–11.5)

## 2023-01-09 PROCEDURE — 1159F PR MEDICATION LIST DOCUMENTED IN MEDICAL RECORD: ICD-10-PCS | Mod: CPTII,,, | Performed by: NURSE PRACTITIONER

## 2023-01-09 PROCEDURE — 1159F MED LIST DOCD IN RCRD: CPT | Mod: CPTII,,, | Performed by: NURSE PRACTITIONER

## 2023-01-09 PROCEDURE — 80053 COMPREHEN METABOLIC PANEL: CPT | Performed by: NURSE PRACTITIONER

## 2023-01-09 PROCEDURE — 3079F DIAST BP 80-89 MM HG: CPT | Mod: CPTII,,, | Performed by: NURSE PRACTITIONER

## 2023-01-09 PROCEDURE — 86361 T CELL ABSOLUTE COUNT: CPT | Performed by: NURSE PRACTITIONER

## 2023-01-09 PROCEDURE — 3008F BODY MASS INDEX DOCD: CPT | Mod: CPTII,,, | Performed by: NURSE PRACTITIONER

## 2023-01-09 PROCEDURE — 99214 PR OFFICE/OUTPT VISIT, EST, LEVL IV, 30-39 MIN: ICD-10-PCS | Mod: S$PBB,,, | Performed by: NURSE PRACTITIONER

## 2023-01-09 PROCEDURE — 1160F PR REVIEW ALL MEDS BY PRESCRIBER/CLIN PHARMACIST DOCUMENTED: ICD-10-PCS | Mod: CPTII,,, | Performed by: NURSE PRACTITIONER

## 2023-01-09 PROCEDURE — 87536 HIV-1 QUANT&REVRSE TRNSCRPJ: CPT | Performed by: NURSE PRACTITIONER

## 2023-01-09 PROCEDURE — 82306 VITAMIN D 25 HYDROXY: CPT | Performed by: NURSE PRACTITIONER

## 2023-01-09 PROCEDURE — 86780 TREPONEMA PALLIDUM: CPT | Performed by: NURSE PRACTITIONER

## 2023-01-09 PROCEDURE — 3075F SYST BP GE 130 - 139MM HG: CPT | Mod: CPTII,,, | Performed by: NURSE PRACTITIONER

## 2023-01-09 PROCEDURE — 3075F PR MOST RECENT SYSTOLIC BLOOD PRESS GE 130-139MM HG: ICD-10-PCS | Mod: CPTII,,, | Performed by: NURSE PRACTITIONER

## 2023-01-09 PROCEDURE — 90686 IIV4 VACC NO PRSV 0.5 ML IM: CPT | Mod: PBBFAC

## 2023-01-09 PROCEDURE — 3008F PR BODY MASS INDEX (BMI) DOCUMENTED: ICD-10-PCS | Mod: CPTII,,, | Performed by: NURSE PRACTITIONER

## 2023-01-09 PROCEDURE — 3079F PR MOST RECENT DIASTOLIC BLOOD PRESSURE 80-89 MM HG: ICD-10-PCS | Mod: CPTII,,, | Performed by: NURSE PRACTITIONER

## 2023-01-09 PROCEDURE — 86592 SYPHILIS TEST NON-TREP QUAL: CPT | Performed by: NURSE PRACTITIONER

## 2023-01-09 PROCEDURE — 90472 IMMUNIZATION ADMIN EACH ADD: CPT | Mod: PBBFAC

## 2023-01-09 PROCEDURE — 86480 TB TEST CELL IMMUN MEASURE: CPT | Performed by: NURSE PRACTITIONER

## 2023-01-09 PROCEDURE — 1160F RVW MEDS BY RX/DR IN RCRD: CPT | Mod: CPTII,,, | Performed by: NURSE PRACTITIONER

## 2023-01-09 PROCEDURE — 99214 OFFICE O/P EST MOD 30 MIN: CPT | Mod: PBBFAC,25 | Performed by: NURSE PRACTITIONER

## 2023-01-09 PROCEDURE — 36415 COLL VENOUS BLD VENIPUNCTURE: CPT | Performed by: NURSE PRACTITIONER

## 2023-01-09 PROCEDURE — 85025 COMPLETE CBC W/AUTO DIFF WBC: CPT | Performed by: NURSE PRACTITIONER

## 2023-01-09 PROCEDURE — 99214 OFFICE O/P EST MOD 30 MIN: CPT | Mod: S$PBB,,, | Performed by: NURSE PRACTITIONER

## 2023-01-09 RX ORDER — EMTRICITABINE, RILPIVIRINE HYDROCHLORIDE, AND TENOFOVIR ALAFENAMIDE 200; 25; 25 MG/1; MG/1; MG/1
1 TABLET ORAL DAILY
Qty: 30 TABLET | Refills: 4 | Status: SHIPPED | OUTPATIENT
Start: 2023-01-09 | End: 2023-05-09 | Stop reason: SDUPTHER

## 2023-01-09 RX ORDER — CETIRIZINE HYDROCHLORIDE 10 MG/1
10 TABLET ORAL NIGHTLY
Qty: 30 TABLET | Refills: 4 | Status: SHIPPED | OUTPATIENT
Start: 2023-01-09 | End: 2023-12-12

## 2023-01-10 ENCOUNTER — TELEPHONE (OUTPATIENT)
Dept: INFECTIOUS DISEASES | Facility: CLINIC | Age: 53
End: 2023-01-10
Payer: MEDICARE

## 2023-01-10 DIAGNOSIS — E55.9 VITAMIN D DEFICIENCY: Primary | ICD-10-CM

## 2023-01-10 LAB
AGE: 52
CD3+CD4+ CELLS # BLD: 38 % (ref 28–48)
CD3+CD4+ CELLS # SPEC: 1284.89 UNIT/L (ref 589–1505)
CD3+CD4+ CELLS NFR BLD: 66.3 %
LYMPHOCYTES # BLD AUTO: 1938 X10(3)/MCL (ref 1260–5520)
LYMPHOMA - T-CELL MARKERS SPEC-IMP: NORMAL
RPR SER QL: REACTIVE
RPR SER-TITR: ABNORMAL {TITER}
WBC # BLD AUTO: 5100 /MM3 (ref 4500–11500)

## 2023-01-10 RX ORDER — ERGOCALCIFEROL 1.25 MG/1
50000 CAPSULE ORAL
Qty: 4 CAPSULE | Refills: 4 | Status: SHIPPED | OUTPATIENT
Start: 2023-01-10 | End: 2023-05-09 | Stop reason: ALTCHOICE

## 2023-01-10 NOTE — PROGRESS NOTES
Vitamin D level noted low at 24.1. Normal 30-80. Refill weekly Ergocalciferol sent to pharmacy. Please let pt know and stress compliance. Thank you

## 2023-01-10 NOTE — TELEPHONE ENCOUNTER
----- Message from GABRIELA Buchanan sent at 1/10/2023  9:00 AM CST -----  Vitamin D level noted low at 24.1. Normal 30-80. Refill weekly Ergocalciferol sent to pharmacy. Please let pt know and stress compliance. Thank you

## 2023-01-10 NOTE — TELEPHONE ENCOUNTER
Called and spoke with patient in regards to results and Khadijah's recommendations. Also encouraged med compliance. Patient voiced understanding.

## 2023-01-11 ENCOUNTER — TELEPHONE (OUTPATIENT)
Dept: INFECTIOUS DISEASES | Facility: CLINIC | Age: 53
End: 2023-01-11
Payer: MEDICARE

## 2023-01-11 DIAGNOSIS — Z86.19 HISTORY OF SYPHILIS: Primary | ICD-10-CM

## 2023-01-11 LAB
GAMMA INTERFERON BACKGROUND BLD IA-ACNC: 0.03 IU/ML
HIV1 RNA # PLAS NAA DL=20: NORMAL COPIES/ML
M TB IFN-G BLD-IMP: NEGATIVE
M TB IFN-G CD4+ BCKGRND COR BLD-ACNC: 0.02 IU/ML
M TB IFN-G CD4+CD8+ BCKGRND COR BLD-ACNC: 0.01 IU/ML
MITOGEN IGNF BCKGRD COR BLD-ACNC: >10 IU/ML

## 2023-01-11 NOTE — PROGRESS NOTES
RPR noted with increase from 0 dils to 2 dils. I called and discussed results with pt. Will need re-treatment for syphilis with Bicillin 2.4 million units IM x 1. Please call and schedule pt for shot clinic. Propose medication when he is able to come. Stressed partner testing / treatment / sexual precautions. Thank you.

## 2023-01-11 NOTE — TELEPHONE ENCOUNTER
----- Message from GABRIELA Buchanan sent at 1/11/2023  8:22 AM CST -----  RPR noted with increase from 0 dils to 2 dils. I called and discussed results with pt. Will need re-treatment for syphilis with Bicillin 2.4 million units IM x 1. Please call and schedule pt for shot clinic. Propose medication when he is able to come. Stressed partner testing / treatment / sexual precautions. Thank you.

## 2023-01-11 NOTE — TELEPHONE ENCOUNTER
Patient returned the call. He is scheduled for 01/13/2023 at 10:30am to receive Bicillin injection.

## 2023-01-13 ENCOUNTER — CLINICAL SUPPORT (OUTPATIENT)
Dept: INFECTIOUS DISEASES | Facility: CLINIC | Age: 53
End: 2023-01-13
Payer: MEDICARE

## 2023-01-13 DIAGNOSIS — Z86.19 HISTORY OF SYPHILIS: Primary | ICD-10-CM

## 2023-01-13 PROCEDURE — 99211 OFF/OP EST MAY X REQ PHY/QHP: CPT | Mod: PBBFAC

## 2023-01-13 PROCEDURE — 96372 THER/PROPH/DIAG INJ SC/IM: CPT | Mod: PBBFAC

## 2023-01-13 RX ADMIN — PENICILLIN G BENZATHINE 2.4 MILLION UNITS: 1200000 INJECTION, SUSPENSION INTRAMUSCULAR at 10:01

## 2023-03-17 ENCOUNTER — CLINICAL SUPPORT (OUTPATIENT)
Dept: OPHTHALMOLOGY | Facility: CLINIC | Age: 53
End: 2023-03-17
Payer: MEDICARE

## 2023-03-17 DIAGNOSIS — B20 HIV DISEASE: Primary | ICD-10-CM

## 2023-03-17 PROCEDURE — 92250 FUNDUS PHOTOGRAPHY W/I&R: CPT | Mod: PBBFAC,PO | Performed by: OPHTHALMOLOGY

## 2023-03-17 PROCEDURE — 99211 OFF/OP EST MAY X REQ PHY/QHP: CPT | Mod: PBBFAC,PO

## 2023-03-17 PROCEDURE — 92250 FUNDUS PHOTOGRAPHY W/I&R: CPT | Mod: PBBFAC,PO

## 2023-03-17 NOTE — PROGRESS NOTES
Assessment /Plan     For exam results, see Encounter Report.    HIV disease  -     Ambulatory referral/consult to Ophthalmology  -     Color Fundus Photography - OU - Both Eyes; Future      Fundus photo done

## 2023-03-23 NOTE — PROGRESS NOTES
52 year old with HIV presented for fundus photos.  Media: clear  Optic nerves: 0.3/0.45 SPH  Posterior pole: no evidence of retinopathy.    IOP was a bit elevated.  Will bring the patient in for an IOP check.

## 2023-05-09 ENCOUNTER — OFFICE VISIT (OUTPATIENT)
Dept: INFECTIOUS DISEASES | Facility: CLINIC | Age: 53
End: 2023-05-09
Payer: MEDICARE

## 2023-05-09 ENCOUNTER — LAB VISIT (OUTPATIENT)
Dept: LAB | Facility: HOSPITAL | Age: 53
End: 2023-05-09
Attending: NURSE PRACTITIONER
Payer: MEDICARE

## 2023-05-09 VITALS
WEIGHT: 187 LBS | SYSTOLIC BLOOD PRESSURE: 129 MMHG | HEART RATE: 81 BPM | HEIGHT: 66 IN | BODY MASS INDEX: 30.05 KG/M2 | TEMPERATURE: 99 F | RESPIRATION RATE: 14 BRPM | DIASTOLIC BLOOD PRESSURE: 81 MMHG

## 2023-05-09 DIAGNOSIS — Z86.19 HEPATITIS C VIRUS INFECTION CURED AFTER ANTIVIRAL DRUG THERAPY: ICD-10-CM

## 2023-05-09 DIAGNOSIS — Z11.3 ROUTINE SCREENING FOR STI (SEXUALLY TRANSMITTED INFECTION): ICD-10-CM

## 2023-05-09 DIAGNOSIS — Z86.19 HISTORY OF SYPHILIS: ICD-10-CM

## 2023-05-09 DIAGNOSIS — Z12.9 CANCER SCREENING: ICD-10-CM

## 2023-05-09 DIAGNOSIS — B20 HIV DISEASE: ICD-10-CM

## 2023-05-09 DIAGNOSIS — B20 HIV DISEASE: Primary | ICD-10-CM

## 2023-05-09 DIAGNOSIS — Z23 NEED FOR VACCINATION: ICD-10-CM

## 2023-05-09 DIAGNOSIS — E55.9 VITAMIN D DEFICIENCY: ICD-10-CM

## 2023-05-09 DIAGNOSIS — Z72.0 TOBACCO USE: ICD-10-CM

## 2023-05-09 DIAGNOSIS — Z00.00 HEALTH CARE MAINTENANCE: ICD-10-CM

## 2023-05-09 LAB
ALBUMIN SERPL-MCNC: 3.9 G/DL (ref 3.5–5)
ALBUMIN/GLOB SERPL: 1 RATIO (ref 1.1–2)
ALP SERPL-CCNC: 62 UNIT/L (ref 40–150)
ALT SERPL-CCNC: 17 UNIT/L (ref 0–55)
AST SERPL-CCNC: 23 UNIT/L (ref 5–34)
BASOPHILS # BLD AUTO: 0.02 X10(3)/MCL
BASOPHILS NFR BLD AUTO: 0.5 %
BILIRUBIN DIRECT+TOT PNL SERPL-MCNC: 0.7 MG/DL
BUN SERPL-MCNC: 9.5 MG/DL (ref 8.4–25.7)
C TRACH DNA SPEC QL NAA+PROBE: NOT DETECTED
C TRACH DNA SPEC QL NAA+PROBE: NOT DETECTED
CALCIUM SERPL-MCNC: 9.3 MG/DL (ref 8.4–10.2)
CHLORIDE SERPL-SCNC: 108 MMOL/L (ref 98–107)
CO2 SERPL-SCNC: 20 MMOL/L (ref 22–29)
CREAT SERPL-MCNC: 0.81 MG/DL (ref 0.73–1.18)
DEPRECATED CALCIDIOL+CALCIFEROL SERPL-MC: 27.5 NG/ML (ref 30–80)
EOSINOPHIL # BLD AUTO: 0.14 X10(3)/MCL (ref 0–0.9)
EOSINOPHIL NFR BLD AUTO: 3.2 %
ERYTHROCYTE [DISTWIDTH] IN BLOOD BY AUTOMATED COUNT: 11.7 % (ref 11.5–17)
GFR SERPLBLD CREATININE-BSD FMLA CKD-EPI: >60 MLS/MIN/1.73/M2
GLOBULIN SER-MCNC: 3.9 GM/DL (ref 2.4–3.5)
GLUCOSE SERPL-MCNC: 113 MG/DL (ref 74–100)
HCT VFR BLD AUTO: 43.9 % (ref 42–52)
HGB BLD-MCNC: 14.7 G/DL (ref 14–18)
IMM GRANULOCYTES # BLD AUTO: 0.01 X10(3)/MCL (ref 0–0.04)
IMM GRANULOCYTES NFR BLD AUTO: 0.2 %
LYMPHOCYTES # BLD AUTO: 2.25 X10(3)/MCL (ref 0.6–4.6)
LYMPHOCYTES NFR BLD AUTO: 50.7 %
MCH RBC QN AUTO: 35.2 PG (ref 27–31)
MCHC RBC AUTO-ENTMCNC: 33.5 G/DL (ref 33–36)
MCV RBC AUTO: 105 FL (ref 80–94)
MONOCYTES # BLD AUTO: 0.36 X10(3)/MCL (ref 0.1–1.3)
MONOCYTES NFR BLD AUTO: 8.1 %
N GONORRHOEA DNA SPEC QL NAA+PROBE: NOT DETECTED
N GONORRHOEA DNA SPEC QL NAA+PROBE: NOT DETECTED
NEUTROPHILS # BLD AUTO: 1.66 X10(3)/MCL (ref 2.1–9.2)
NEUTROPHILS NFR BLD AUTO: 37.3 %
NRBC BLD AUTO-RTO: 0 %
PLATELET # BLD AUTO: 200 X10(3)/MCL (ref 130–400)
PMV BLD AUTO: 9.8 FL (ref 7.4–10.4)
POTASSIUM SERPL-SCNC: 3.8 MMOL/L (ref 3.5–5.1)
PROT SERPL-MCNC: 7.8 GM/DL (ref 6.4–8.3)
RBC # BLD AUTO: 4.18 X10(6)/MCL (ref 4.7–6.1)
RPR SER QL: NORMAL
RPR SER-TITR: NORMAL {TITER}
SODIUM SERPL-SCNC: 137 MMOL/L (ref 136–145)
T PALLIDUM AB SER QL: REACTIVE
WBC # SPEC AUTO: 4.44 X10(3)/MCL (ref 4.5–11.5)

## 2023-05-09 PROCEDURE — 87591 N.GONORRHOEAE DNA AMP PROB: CPT | Performed by: NURSE PRACTITIONER

## 2023-05-09 PROCEDURE — 80053 COMPREHEN METABOLIC PANEL: CPT

## 2023-05-09 PROCEDURE — 3008F PR BODY MASS INDEX (BMI) DOCUMENTED: ICD-10-PCS | Mod: CPTII,,, | Performed by: NURSE PRACTITIONER

## 2023-05-09 PROCEDURE — 3074F SYST BP LT 130 MM HG: CPT | Mod: CPTII,,, | Performed by: NURSE PRACTITIONER

## 2023-05-09 PROCEDURE — 99213 OFFICE O/P EST LOW 20 MIN: CPT | Mod: PBBFAC,25 | Performed by: NURSE PRACTITIONER

## 2023-05-09 PROCEDURE — 36415 COLL VENOUS BLD VENIPUNCTURE: CPT

## 2023-05-09 PROCEDURE — 1160F RVW MEDS BY RX/DR IN RCRD: CPT | Mod: CPTII,,, | Performed by: NURSE PRACTITIONER

## 2023-05-09 PROCEDURE — 86708 HEPATITIS A ANTIBODY: CPT

## 2023-05-09 PROCEDURE — 82306 VITAMIN D 25 HYDROXY: CPT

## 2023-05-09 PROCEDURE — 1159F MED LIST DOCD IN RCRD: CPT | Mod: CPTII,,, | Performed by: NURSE PRACTITIONER

## 2023-05-09 PROCEDURE — 87491 CHLMYD TRACH DNA AMP PROBE: CPT | Mod: 90 | Performed by: NURSE PRACTITIONER

## 2023-05-09 PROCEDURE — 90750 HZV VACC RECOMBINANT IM: CPT | Mod: PBBFAC

## 2023-05-09 PROCEDURE — 86780 TREPONEMA PALLIDUM: CPT

## 2023-05-09 PROCEDURE — 87536 HIV-1 QUANT&REVRSE TRNSCRPJ: CPT | Mod: 90

## 2023-05-09 PROCEDURE — 87340 HEPATITIS B SURFACE AG IA: CPT

## 2023-05-09 PROCEDURE — 1159F PR MEDICATION LIST DOCUMENTED IN MEDICAL RECORD: ICD-10-PCS | Mod: CPTII,,, | Performed by: NURSE PRACTITIONER

## 2023-05-09 PROCEDURE — 88112 CYTOPATH CELL ENHANCE TECH: CPT | Performed by: NURSE PRACTITIONER

## 2023-05-09 PROCEDURE — 1160F PR REVIEW ALL MEDS BY PRESCRIBER/CLIN PHARMACIST DOCUMENTED: ICD-10-PCS | Mod: CPTII,,, | Performed by: NURSE PRACTITIONER

## 2023-05-09 PROCEDURE — 85025 COMPLETE CBC W/AUTO DIFF WBC: CPT

## 2023-05-09 PROCEDURE — 86592 SYPHILIS TEST NON-TREP QUAL: CPT

## 2023-05-09 PROCEDURE — 99214 PR OFFICE/OUTPT VISIT, EST, LEVL IV, 30-39 MIN: ICD-10-PCS | Mod: S$PBB,,, | Performed by: NURSE PRACTITIONER

## 2023-05-09 PROCEDURE — 3008F BODY MASS INDEX DOCD: CPT | Mod: CPTII,,, | Performed by: NURSE PRACTITIONER

## 2023-05-09 PROCEDURE — 86780 TREPONEMA PALLIDUM: CPT | Mod: 90

## 2023-05-09 PROCEDURE — 3074F PR MOST RECENT SYSTOLIC BLOOD PRESSURE < 130 MM HG: ICD-10-PCS | Mod: CPTII,,, | Performed by: NURSE PRACTITIONER

## 2023-05-09 PROCEDURE — 99214 OFFICE O/P EST MOD 30 MIN: CPT | Mod: S$PBB,,, | Performed by: NURSE PRACTITIONER

## 2023-05-09 PROCEDURE — 86706 HEP B SURFACE ANTIBODY: CPT

## 2023-05-09 PROCEDURE — 3079F DIAST BP 80-89 MM HG: CPT | Mod: CPTII,,, | Performed by: NURSE PRACTITIONER

## 2023-05-09 PROCEDURE — 90471 IMMUNIZATION ADMIN: CPT | Mod: PBBFAC

## 2023-05-09 PROCEDURE — 3079F PR MOST RECENT DIASTOLIC BLOOD PRESSURE 80-89 MM HG: ICD-10-PCS | Mod: CPTII,,, | Performed by: NURSE PRACTITIONER

## 2023-05-09 RX ORDER — VIT C/E/ZN/COPPR/LUTEIN/ZEAXAN 250MG-90MG
1000 CAPSULE ORAL DAILY
Qty: 30 CAPSULE | Refills: 0 | Status: SHIPPED | OUTPATIENT
Start: 2023-05-09 | End: 2023-05-09

## 2023-05-09 RX ORDER — EMTRICITABINE, RILPIVIRINE HYDROCHLORIDE, AND TENOFOVIR ALAFENAMIDE 200; 25; 25 MG/1; MG/1; MG/1
1 TABLET ORAL DAILY
Qty: 30 TABLET | Refills: 4 | Status: SHIPPED | OUTPATIENT
Start: 2023-05-09 | End: 2023-09-12 | Stop reason: SDUPTHER

## 2023-05-09 RX ORDER — ACETAMINOPHEN 500 MG
2000 TABLET ORAL DAILY
Qty: 30 CAPSULE | Refills: 4 | Status: SHIPPED | OUTPATIENT
Start: 2023-05-09 | End: 2023-09-12 | Stop reason: SDUPTHER

## 2023-05-09 NOTE — PROGRESS NOTES
Patient called [facility] clinic from [facility] lab. They will not draw his labs due to having [employer]. I did advise him out patient services are not covered here at [facility] and that he will need to change to another carrier if he wants to continue here at [facility]. I also instructed him to get a copy of his lab orders and go to [facility]. He verbalized understanding.

## 2023-05-09 NOTE — PROGRESS NOTES
Subjective     Patient ID: Barry Hilton is a 52 y.o. male.    Chief Complaint: b20 f/u and HIV Positive/AIDS    Mr Reddy is a 52 yr old MSM BM who presents for routine HIV visit.  Has a history of hepatitis-C coinfection which he was treated and cured.  Last hepatitis-C viral load was undetectable.  He very well controlled on Odefsey for ART.  He reports well tolerance and compliance with medication.  Last CD4 was 1283 (66.3%) with viral suppression.  Patient states he is not sexually active since January 2023.  Reports last encounter was protected.  History of syphilis.  Last RPR was at 2 Dils from 01/19/2023.  Patient was treated with Bicillin x1.  Patient is due for anal Pap today to which he is amenable.  Patient with no current complaints. Denies fever, chills, night sweats, rash, HA, vision changes, dizziness, CP/SOB, cough, N/V/D, abdominal pain, or urinary complaints.  Patient is on a weekly vitamin-D supplement.  He has a an appointment with [facility] scheduled for next week.  Smokes half a pack per day.  Drinks 1/2 - 1 gal dark liquor on the weekends only. Due for Shingrix vaccine #2 today to which pt is amendable.    1/9/23  Mr Reddy is a 52 yr old MSM BM who presents for routine HIV visit.  He remains on Odefsey in which he is very well controlled. He reports most often compliance with ART and well tolerance. Last CD4 1012 (56.1%) with viral suppression. He has a history of hepatitis C co-infection in which he was treated and cured. Last hepatitis C viral load was undetectable. Pt reports no new exposures or need for additional screening at this time. Denies sexual activity or need for STI screening today. Bottoms. Pt would like to defer anal pap until next visit. History noted of syphilis in which he had a reactive RPR at 0 dils on last visit and was treated with Bicillin 2.4 million units IM x 1 on 6/30/22. He reports no current complaints. Denies fever, chills, night sweats, rash, HA,  "vision changes, dizziness, CP/SOB, cough, N/V/D, abdominal pain, or urinary complaints.  On weekly vitamin D supplement. Last eye exam about 4-5 years ago with Dr Barrett. Pt denies prior history of colonoscopy. Agrees to screening with colshoaib. Smokes 1 ppd. Drinks 1 pint mike on weekends only. Denies history of illicit drug use. Pt with no immunity to Hep A and Hep B. He did not complete previous Twinrix series.  He is amendable to restarting series again today.      6/29/22  Mr Hilton is a 51 yr old BM MSM who presents for routine HIV visit.  He is on Odefsey which he states he tolerated well and rarely misses.  History of Hep C co-infection which was treated and cured.  Last sexually active about 6 months ago.  Sex always reported as protected.  Bottoms.  Pt with no current complaints.  Denies fever, chills, night sweats, rash, HA, vision changes, dizziness, CP/SOB, cough, N/V/D, abdominal pain, or urinary complaints.  Pt states is on a weekly vitamin D supplement.  Pt taking Lexapro daily with good results.  States really helps him.  No SI/HI/hallucinations.  Pt states RT axiallary mass / lymphadenopathy has resolved.  Smokes 1 - 1 1/2 ppd.  Drinks ETOH on weekends only; 1 pint mike.  No drug use reported.  Due for Twinrix #1 today to which pt is amendable.      12/15/2021 (per Dr SUGAR Grier)  Here today without any complaints for his HIV followup, tells me he is doing great on his ART (Odefsey) and missing no doses  Reports 100% compliance to same over the last month, but has missed a "few pills the months before that"   Denies fever/chills/night sweats/weight loss  No n/v/d/abd pain  No new rashes  No cp/sob  No ha/changes in vision  Sexually active occasionally, using condoms every time subjectively, exclusively the receptive partner in MSM encounters    Review of Systems   Constitutional:  Negative for chills, diaphoresis, fatigue and fever.   HENT:  Negative for nasal congestion, dental problem, ear pain, " facial swelling, hearing loss and sore throat.    Eyes:  Negative for photophobia, pain, redness and visual disturbance.   Respiratory:  Negative for cough, chest tightness and shortness of breath.    Cardiovascular:  Negative for chest pain, palpitations and leg swelling.   Gastrointestinal:  Negative for abdominal pain, constipation, diarrhea, nausea and vomiting.   Endocrine: Negative for cold intolerance, heat intolerance, polydipsia, polyphagia and polyuria.   Genitourinary:  Negative for dysuria, frequency, genital sores, hematuria and urgency.   Musculoskeletal:  Negative for back pain, joint swelling, leg pain, neck pain and neck stiffness.   Integumentary:  Negative for rash and wound.   Allergic/Immunologic: Negative.    Neurological:  Negative for dizziness, seizures, syncope, weakness, numbness and headaches.   Hematological: Negative.    Psychiatric/Behavioral:  Negative for confusion and hallucinations.         Objective     Physical Exam  Exam conducted with a chaperone present.   Constitutional:       General: He is awake. He is not in acute distress.     Appearance: Normal appearance. He is normal weight. He is not ill-appearing, toxic-appearing or diaphoretic.   HENT:      Head: Normocephalic and atraumatic.      Nose: Nose normal.      Mouth/Throat:      Mouth: Mucous membranes are moist.      Pharynx: Oropharynx is clear. No oropharyngeal exudate or posterior oropharyngeal erythema.      Comments: No thrush  Eyes:      General: No scleral icterus.     Conjunctiva/sclera: Conjunctivae normal.      Pupils: Pupils are equal, round, and reactive to light.   Neck:      Comments: No JVD noted  Cardiovascular:      Rate and Rhythm: Normal rate and regular rhythm.      Heart sounds: Normal heart sounds. No murmur heard.    No friction rub. No gallop.   Pulmonary:      Effort: Pulmonary effort is normal. No respiratory distress.      Breath sounds: Normal breath sounds. No wheezing.   Abdominal:       General: Bowel sounds are normal. There is no distension.      Palpations: Abdomen is soft. There is no mass.      Tenderness: There is no abdominal tenderness. There is no guarding or rebound.      Hernia: No hernia is present.   Genitourinary:     Prostate: Not enlarged, not tender and no nodules present.      Rectum: No mass, tenderness, anal fissure, external hemorrhoid or internal hemorrhoid. Normal anal tone.      Comments: Anal pap done and swabs collected. Atraumatic. Pt tolerated well. No significant grossly abnormality noted upon exam.   Musculoskeletal:         General: No swelling or deformity. Normal range of motion.      Cervical back: Normal range of motion and neck supple.      Right lower leg: No edema.      Left lower leg: No edema.   Skin:     General: Skin is warm and dry.      Capillary Refill: Capillary refill takes less than 2 seconds.      Coloration: Skin is not jaundiced.      Findings: No rash.   Neurological:      General: No focal deficit present.      Mental Status: He is alert and oriented to person, place, and time.   Psychiatric:         Mood and Affect: Mood normal.         Behavior: Behavior normal.          Assessment and Plan     Problem List Items Addressed This Visit          ID    HIV disease - Primary    Relevant Medications    ODEFSEY 200-25-25 mg Tab    Other Relevant Orders    CBC Auto Differential (Completed)    CD4 Lymphocytes    Comprehensive Metabolic Panel    HIV-1 RNA, Quantitative, PCR with Reflex to Genotype    Cytology, Fluid/Wash/Brush    History of syphilis    Relevant Orders    SYPHILIS ANTIBODY (WITH REFLEX RPR)       Endocrine    Vitamin D deficiency    Relevant Orders    Vitamin D       GI    Hepatitis C virus infection cured after antiviral drug therapy    Relevant Orders    Hepatitis C Virus Quantitative       Other    Tobacco use     Other Visit Diagnoses       Routine screening for STI (sexually transmitted infection)        Relevant Orders    SYPHILIS  ANTIBODY (WITH REFLEX RPR)    Hepatitis B Surface Antigen    C.trach/N.gonor AMP RNA    Chlamydia/GC, PCR    Chlamydia/GC, PCR    Cancer screening        Relevant Orders    Cytology, Fluid/Wash/Cavalier    Health care maintenance        Need for vaccination        Relevant Orders    Hepatitis A antibody, IgG    Hepatitis B Surface Ab, Qualitative    (In Office Administered) Zoster Recombinant Vaccine (Completed)            HIV disease  -     ODEFSEY 200-25-25 mg Tab; Take 1 tablet by mouth once daily.  Dispense: 30 tablet; Refill: 4  -     CBC Auto Differential; Future; Expected date: 05/09/2023  -     CD4 Lymphocytes; Future; Expected date: 05/09/2023  -     Comprehensive Metabolic Panel; Future; Expected date: 05/09/2023  -     HIV-1 RNA, Quantitative, PCR with Reflex to Genotype; Future; Expected date: 05/09/2023  -     Cytology, Fluid/Wash/Brush   Last CD4 was 1283 (66.3%) with viral suppression  All labs reviewed in detail with patient   Repeat labs today   Continue Odefsey   Discussed HIV status at length to include need for 100% medication compliance and adherence, along with safe sex counseling performed.  Patient voiced understanding to both.  Will check labs today to include CD4, viral load, CBC and CMP.  Discussed importance of scheduled follow up as well.   Return to clinic 4 months with Khadijah FARAH    Hepatitis C virus infection cured after antiviral drug therapy  -     Hepatitis C Virus Quantitative; Future; Expected date: 05/09/2023  Treated and cured 2019. F0 disease  Last HCV VL undetectable   Encouraged continued abstinence from alcohol and illicit drugs. No Tylenol at doses greater than 2 g daily. No sharing needles, razors, nail clippers or razor blades. Counseled regarding safe sex at length, condoms offered.   Repeat lab today    Routine screening for STI (sexually transmitted infection)  -     SYPHILIS ANTIBODY (WITH REFLEX RPR); Future; Expected date: 05/09/2023  -     Hepatitis B Surface Antigen;  Future; Expected date: 05/09/2023  -     C.trach/N.gonor AMP RNA  -     Chlamydia/GC, PCR  -     Chlamydia/GC, PCR  Due for GC/CT screening (urine / oral / rectal), along with screening for syphilis and hepatitis    History of syphilis  -     SYPHILIS ANTIBODY (WITH REFLEX RPR); Future; Expected date: 05/09/2023  -     RPR  RPR 2 dils from 1/9/23 <--- RPR reactive at 0 dils from 6/29/22 <--- RPR nonreactive from 12/2021  Treated with Bicillin 2.4 million units IM on 1/13/23  Repeat labs today    Cancer screening  -     Cytology, Fluid/Wash/Brush  Anal pap done today    Vitamin D deficiency  -     Vitamin D; Future; Expected date: 05/09/2023  Last level 24.1   Patient is on a weekly vitamin-D supplement   Repeat level today  Results will determine need/dosage of supplement     Health care maintenance  Cologuard testing has been ordered for patient in past for colon cancer screening   Patient states having an issue with his insurance for this   We will have nurse assist     Tobacco use  Smoking cessation encouraged     Need for vaccination  -     Hepatitis A antibody, IgG; Future; Expected date: 05/09/2023  -     Hepatitis B Surface Ab, Qualitative; Future; Expected date: 05/09/2023  -     (In Office Administered) Zoster Recombinant Vaccine  We will check immunity to hepatitis a and hepatitis-B   Due for Shingrix vaccine #2 today           I spent a total of 35 minutes on the day of the visit.This includes face to face time and non-face to face time preparing to see the patient (eg, review of tests), obtaining and/or reviewing separately obtained history, documenting clinical information in the electronic or other health record, independently interpreting results and communicating results to the patient/family/caregiver, or care coordinator.

## 2023-05-09 NOTE — PROGRESS NOTES
Vitamin-D level still remains a little bit low at 27.5.  Please let patient know that a daily vitamin-D supplement has been sent to his pharmacy instead of weekly.  Thank you

## 2023-05-10 ENCOUNTER — TELEPHONE (OUTPATIENT)
Dept: INFECTIOUS DISEASES | Facility: CLINIC | Age: 53
End: 2023-05-10
Payer: MEDICARE

## 2023-05-10 DIAGNOSIS — B20 HIV DISEASE: Primary | ICD-10-CM

## 2023-05-10 LAB
C TRACH RRNA SPEC QL NAA+PROBE: NEGATIVE
HAV AB SER QL IA: REACTIVE
HBV SURFACE AB SER-ACNC: 146.23 MIU/ML
HBV SURFACE AB SERPL IA-ACNC: REACTIVE M[IU]/ML
HBV SURFACE AG SERPL QL IA: NONREACTIVE
N GONORRHOEA RRNA SPEC QL NAA+PROBE: NEGATIVE
SPECIMEN SOURCE: NORMAL
SPECIMEN SOURCE: NORMAL

## 2023-05-10 NOTE — TELEPHONE ENCOUNTER
Neetu from [facility] lab called, to report they don't have enough blood for CD4. Advised Neetu that they will need to call Mr Rdedy to go back to her lab, we don't accept his insurance here.  New order placed.      THUAN Lucio

## 2023-05-11 LAB
HIV1 RNA # PLAS NAA DL=20: NORMAL COPIES/ML
PSYCHE PATHOLOGY RESULT: NORMAL

## 2023-05-12 LAB — T PALLIDUM AB SER QL: REACTIVE

## 2023-05-16 ENCOUNTER — LAB VISIT (OUTPATIENT)
Dept: LAB | Facility: HOSPITAL | Age: 53
End: 2023-05-16
Attending: NURSE PRACTITIONER
Payer: MEDICARE

## 2023-05-16 DIAGNOSIS — B20 HIV DISEASE: ICD-10-CM

## 2023-05-16 PROCEDURE — 86361 T CELL ABSOLUTE COUNT: CPT

## 2023-05-16 PROCEDURE — 36415 COLL VENOUS BLD VENIPUNCTURE: CPT

## 2023-05-18 LAB
AGE: 52
CD3+CD4+ CELLS # SPEC: 1044.3 UNIT/L (ref 589–1505)
CD3+CD4+ CELLS NFR BLD: 59 %
LYMPHOCYTES # BLD AUTO: 1770 X10(3)/MCL (ref 1260–5520)
LYMPHOCYTES NFR LN MANUAL: 37.5 % (ref 28–48)
LYMPHOMA - T-CELL MARKERS SPEC-IMP: NORMAL
WBC # BLD AUTO: 4720 /MM3 (ref 4500–11500)

## 2023-09-12 ENCOUNTER — OFFICE VISIT (OUTPATIENT)
Dept: INFECTIOUS DISEASES | Facility: CLINIC | Age: 53
End: 2023-09-12
Payer: MEDICARE

## 2023-09-12 VITALS
SYSTOLIC BLOOD PRESSURE: 120 MMHG | RESPIRATION RATE: 16 BRPM | HEART RATE: 87 BPM | BODY MASS INDEX: 29.89 KG/M2 | DIASTOLIC BLOOD PRESSURE: 79 MMHG | TEMPERATURE: 98 F | WEIGHT: 186 LBS | HEIGHT: 66 IN

## 2023-09-12 DIAGNOSIS — Z86.19 HEPATITIS C VIRUS INFECTION CURED AFTER ANTIVIRAL DRUG THERAPY: ICD-10-CM

## 2023-09-12 DIAGNOSIS — Z00.00 HEALTH CARE MAINTENANCE: ICD-10-CM

## 2023-09-12 DIAGNOSIS — Z86.19 HISTORY OF SYPHILIS: ICD-10-CM

## 2023-09-12 DIAGNOSIS — Z11.3 ROUTINE SCREENING FOR STI (SEXUALLY TRANSMITTED INFECTION): ICD-10-CM

## 2023-09-12 DIAGNOSIS — E55.9 VITAMIN D DEFICIENCY: ICD-10-CM

## 2023-09-12 DIAGNOSIS — B20 HIV DISEASE: Primary | ICD-10-CM

## 2023-09-12 LAB
ALBUMIN SERPL-MCNC: 3.8 G/DL (ref 3.5–5)
ALBUMIN/GLOB SERPL: 0.9 RATIO (ref 1.1–2)
ALP SERPL-CCNC: 63 UNIT/L (ref 40–150)
ALT SERPL-CCNC: 15 UNIT/L (ref 0–55)
APPEARANCE UR: CLEAR
AST SERPL-CCNC: 19 UNIT/L (ref 5–34)
BACTERIA #/AREA URNS AUTO: ABNORMAL /HPF
BASOPHILS # BLD AUTO: 0.03 X10(3)/MCL
BASOPHILS NFR BLD AUTO: 0.8 %
BILIRUB SERPL-MCNC: 1.3 MG/DL
BILIRUB UR QL STRIP.AUTO: NEGATIVE
BUN SERPL-MCNC: 9.2 MG/DL (ref 8.4–25.7)
C TRACH DNA SPEC QL NAA+PROBE: NOT DETECTED
CALCIUM SERPL-MCNC: 9.7 MG/DL (ref 8.4–10.2)
CHLORIDE SERPL-SCNC: 108 MMOL/L (ref 98–107)
CO2 SERPL-SCNC: 23 MMOL/L (ref 22–29)
COLOR UR: ABNORMAL
CREAT SERPL-MCNC: 0.81 MG/DL (ref 0.73–1.18)
EOSINOPHIL # BLD AUTO: 0.13 X10(3)/MCL (ref 0–0.9)
EOSINOPHIL NFR BLD AUTO: 3.5 %
ERYTHROCYTE [DISTWIDTH] IN BLOOD BY AUTOMATED COUNT: 12.4 % (ref 11.5–17)
GFR SERPLBLD CREATININE-BSD FMLA CKD-EPI: >60 MLS/MIN/1.73/M2
GLOBULIN SER-MCNC: 4.2 GM/DL (ref 2.4–3.5)
GLUCOSE SERPL-MCNC: 96 MG/DL (ref 74–100)
GLUCOSE UR QL STRIP.AUTO: NORMAL
HCT VFR BLD AUTO: 46.7 % (ref 42–52)
HGB BLD-MCNC: 16.2 G/DL (ref 14–18)
HYALINE CASTS #/AREA URNS LPF: ABNORMAL /LPF
IMM GRANULOCYTES # BLD AUTO: 0 X10(3)/MCL (ref 0–0.04)
IMM GRANULOCYTES NFR BLD AUTO: 0 %
KETONES UR QL STRIP.AUTO: NEGATIVE
LEUKOCYTE ESTERASE UR QL STRIP.AUTO: NEGATIVE
LYMPHOCYTES # BLD AUTO: 1.39 X10(3)/MCL (ref 0.6–4.6)
LYMPHOCYTES NFR BLD AUTO: 37.3 %
MCH RBC QN AUTO: 35.7 PG (ref 27–31)
MCHC RBC AUTO-ENTMCNC: 34.7 G/DL (ref 33–36)
MCV RBC AUTO: 102.9 FL (ref 80–94)
MONOCYTES # BLD AUTO: 0.4 X10(3)/MCL (ref 0.1–1.3)
MONOCYTES NFR BLD AUTO: 10.7 %
MUCOUS THREADS URNS QL MICRO: ABNORMAL /LPF
N GONORRHOEA DNA SPEC QL NAA+PROBE: NOT DETECTED
NEUTROPHILS # BLD AUTO: 1.78 X10(3)/MCL (ref 2.1–9.2)
NEUTROPHILS NFR BLD AUTO: 47.7 %
NITRITE UR QL STRIP.AUTO: NEGATIVE
NRBC BLD AUTO-RTO: 0 %
PH UR STRIP.AUTO: 5.5 [PH]
PLATELET # BLD AUTO: 222 X10(3)/MCL (ref 130–400)
PMV BLD AUTO: 10.4 FL (ref 7.4–10.4)
POTASSIUM SERPL-SCNC: 3.8 MMOL/L (ref 3.5–5.1)
PROT SERPL-MCNC: 8 GM/DL (ref 6.4–8.3)
PROT UR QL STRIP.AUTO: NEGATIVE
RBC # BLD AUTO: 4.54 X10(6)/MCL (ref 4.7–6.1)
RBC #/AREA URNS AUTO: ABNORMAL /HPF
RBC UR QL AUTO: NEGATIVE
RPR SER QL: REACTIVE
RPR SER-TITR: ABNORMAL {TITER}
SODIUM SERPL-SCNC: 138 MMOL/L (ref 136–145)
SOURCE (OHS): NORMAL
SP GR UR STRIP.AUTO: 1.02 (ref 1–1.03)
SQUAMOUS #/AREA URNS LPF: ABNORMAL /HPF
T PALLIDUM AB SER QL: REACTIVE
UROBILINOGEN UR STRIP-ACNC: NORMAL
WBC # SPEC AUTO: 3.73 X10(3)/MCL (ref 4.5–11.5)
WBC #/AREA URNS AUTO: ABNORMAL /HPF

## 2023-09-12 PROCEDURE — 81001 URINALYSIS AUTO W/SCOPE: CPT

## 2023-09-12 PROCEDURE — 87522 HEPATITIS C REVRS TRNSCRPJ: CPT

## 2023-09-12 PROCEDURE — 86361 T CELL ABSOLUTE COUNT: CPT

## 2023-09-12 PROCEDURE — 86780 TREPONEMA PALLIDUM: CPT

## 2023-09-12 PROCEDURE — 87536 HIV-1 QUANT&REVRSE TRNSCRPJ: CPT

## 2023-09-12 PROCEDURE — 80053 COMPREHEN METABOLIC PANEL: CPT

## 2023-09-12 PROCEDURE — 86480 TB TEST CELL IMMUN MEASURE: CPT

## 2023-09-12 PROCEDURE — 86592 SYPHILIS TEST NON-TREP QUAL: CPT

## 2023-09-12 PROCEDURE — 85025 COMPLETE CBC W/AUTO DIFF WBC: CPT

## 2023-09-12 PROCEDURE — 36415 COLL VENOUS BLD VENIPUNCTURE: CPT

## 2023-09-12 PROCEDURE — 99213 OFFICE O/P EST LOW 20 MIN: CPT | Mod: PBBFAC

## 2023-09-12 PROCEDURE — 87591 N.GONORRHOEAE DNA AMP PROB: CPT

## 2023-09-12 RX ORDER — ACETAMINOPHEN 500 MG
2000 TABLET ORAL DAILY
Qty: 30 CAPSULE | Refills: 4 | Status: SHIPPED | OUTPATIENT
Start: 2023-09-12 | End: 2024-02-09

## 2023-09-12 RX ORDER — EMTRICITABINE, RILPIVIRINE HYDROCHLORIDE, AND TENOFOVIR ALAFENAMIDE 200; 25; 25 MG/1; MG/1; MG/1
1 TABLET ORAL DAILY
Qty: 30 TABLET | Refills: 2 | Status: SHIPPED | OUTPATIENT
Start: 2023-09-12 | End: 2024-03-25 | Stop reason: SDUPTHER

## 2023-09-12 NOTE — PROGRESS NOTES
[facility] Infectious Diseases Clinic Note    Subjective:      HPI    54 y/o male with PMH HIV disease (last Cd4 1044 (37%), VL UD), hepatitis C s/p treatment, who presents to ID clinic today for follow up visit. He is currently on Odefsey and reports 100% compliance.    He is not currently sexually active, but previously was bottom. He denies weight loss, fever, chills, N/V/D.      HIV history:  Diagnosed 1990s - CD4 and VL at diagnosis unknown  Risk factor - MSM  History OI - None  History STIs - Syphilis  Prior ART:              - Unknown, now on Odefsey for many years  Genotype testing Unknown:              -NRTI resistance mutations: none              -NNRTI resistance mutations: none              -PI resistance mutations: none  HLA- testing Unknown  G6PD testing Unknown    Health Screening:    The following items were screened for at today's visit:  [] Substance Abuse  [] Alcohol Abuse  [] Depression  [] Employment  [] Housing Situation/Homelessness  [] Travel  [] TB Exposure   [] Domestic Abuse   [] Smoking Cessation    Labs  HIV Viral Load: check q3-6 months    Results for orders placed or performed in visit on 05/09/23   HIV-1 RNA, Quantitative, PCR with Reflex to Genotype   Result Value Ref Range    HIV-1 RNA Detect/Quant, P Undetected Undetected copies/mL     CD4 Count: check q3-6 months  Results for orders placed or performed in visit on 05/16/23   CD4 Lymphocytes   Result Value Ref Range    Patient Age 52     WBC Absolute 4,720 4,500 - 11,500 /mm3    Lymph Percent 37.5 28 - 48 %    Lymph Absolute 1,770 1,260 - 5,520 x10(3)/mcL    CD4 % 59 %    CD4 Absolute 1,044.3 589 - 1,505 unit/L    T Cell Interp       Normal absolute lymphocyte count with normal absolute CD4+ lymphocyte count.    Atilio Eng M.D.      CBC with Differential: check q3-6 months  Lab Results   Component Value Date    WBC 4.44 (L) 05/09/2023    RBC 4.18 (L) 05/09/2023    HGB 14.7 05/09/2023    HCT 43.9 05/09/2023    .0 (H)  "05/09/2023    MCH 35.2 (H) 05/09/2023    MCHC 33.5 05/09/2023    RDW 11.7 05/09/2023     05/09/2023    MPV 9.8 05/09/2023     CMP: check q3-6 months  Lab Results   Component Value Date     05/09/2023    K 3.8 05/09/2023    GLUCOSE 113 (H) 05/09/2023    BUN 9.5 05/09/2023    CREATININE 0.81 05/09/2023    CALCIUM 9.3 05/09/2023    BILITOT 0.7 05/09/2023    AST 23 05/09/2023    ALT 17 05/09/2023    ALKPHOS 62 05/09/2023    ALBUMIN 3.9 05/09/2023     Quantiferon: check q1yr  Results for orders placed or performed in visit on 01/09/23   Quantiferon Gold TB   Result Value Ref Range    QuantiFERON-Tb Gold Plus Result Negative Negative    TB1 Ag minus Nil Result 0.02 IU/mL    TB2 Ag minus Nil Result 0.01 IU/mL    Mitogen minus Nil Result >10.00 IU/mL    Nil Result 0.03 IU/mL     Syphilis IgG: check q1yr, unless Hx syphilis then RPR titer q1yr  Results for orders placed or performed in visit on 05/09/23   SYPHILIS ANTIBODY (WITH REFLEX RPR)   Result Value Ref Range    Syphilis Antibody Reactive (A) Nonreactive, Equivocal     Results for orders placed or performed in visit on 05/09/23   RPR   Result Value Ref Range    RPR Non-Reactive Non-Reactive    RPR Titer       Toxoplasmosis IgG: check once  No results found for: "TOXOIGG"  Gonorrhea: check q1yr  Results for orders placed or performed in visit on 05/09/23   C.trach/N.gonor AMP RNA   Result Value Ref Range    Chlamydia trachomatis amplified RNA Negative Negative    Neisseria gonorrhoeae amplified RNA Negative Negative    Source ORAL/THROAT     SOURCE: ORAL/THROAT      Lipid Panel: check q1yr  Lab Results   Component Value Date    TRIG 77 12/15/2021    CHOL 177 12/15/2021    HDL 82 (H) 12/15/2021    VLDL 15 12/15/2021     A1C: check q1yr  Results for orders placed or performed in visit on 04/29/21   Hemoglobin A1C   Result Value Ref Range    Estimated Average Glucose 102.5 mg/dL    Hemoglobin A1c 5.2 <<=7.0 %     Urinalysis: check q1yr, check q6mo if taking " "tenofovir  Lab Results   Component Value Date    PROTEINUA Negative 12/15/2021    UROBILINOGEN 2 (A) 12/15/2021    KETONESU Negative 12/15/2021    NITRITE Negative 12/15/2021    LEUKOCYTESUR Negative 12/15/2021    COLORU LIGHT YELLOW 12/15/2021    RBCUA 0-2 12/15/2021     Glucose-6 Phosphate Dehydrogenase: check once  No components found for: "[id number]"  Hepatitis A IgG: check once  Results for orders placed or performed in visit on 05/09/23   Hepatitis A antibody, IgG   Result Value Ref Range    Hep A IGG Reactive (A) Nonreactive     Hepatitis B Surface Antibody: check once  Results for orders placed or performed in visit on 05/09/23   Hepatitis B Surface Ab, Qualitative   Result Value Ref Range    Hepatitis B Surface Antibody Reactive (A) Nonreactive    Hepatitis B Surface Antibody Qnt 146.23 mIU/mL     Hepatitis C Antibody: check q1yr if high risk, once if low risk  Results for orders placed or performed in visit on 06/29/22   Hepatitis C Antibody   Result Value Ref Range    Hep C Ab Interp Reactive (A) Nonreactive     Varicella IgG: check once  No results found for: "VARICELLAZOS"    Vaccines  Pneumovax-23: if CD4>200 give twice q5yrs apart before age 65, then once at 65, give >8wks from last Prevnar  Prevnar-13: if CD4>200 give once, give >1yr from last Pneumovax-23  Influenza vaccine: q1yr  HPV: Gardasil-9 at 0, 2, and 6 months for ages 15-45  Tdap: if age >35 give Td q10yrs, replace with Tdap once  MenACWY: 2 dose primary series 8 weeks apart, then 1 dose booster q5yr  Immunization History   Administered Date(s) Administered    COVID-19, MRNA, LN-S, PF (Pfizer) ([place]) 01/27/2022    COVID-19, vector-nr, rS-Ad26, PF ([employer]) 03/23/2021    Hepatitis A / Hepatitis B 06/29/2022, 01/09/2023    Hepatitis A, Adult 09/05/2012    Hepatitis B, Adult 10/19/2011    Influenza - Quadrivalent 10/20/2016, 02/16/2017, 11/12/2020, 12/15/2021    Influenza - Quadrivalent - PF (6-35 months) 11/09/2017, 10/23/2018    " "Influenza - Quadrivalent - PF *Preferred* (6 months and older) 02/22/2016, 11/09/2017, 12/15/2021, 01/09/2023    Influenza - Trivalent - PF (ADULT) 10/19/2011, 01/22/2014, 12/01/2014    PPD Test 01/22/2014    Pneumococcal Conjugate - 13 Valent 12/01/2014    Pneumococcal Polysaccharide - 23 Valent 01/28/2013, 10/20/2016, 04/29/2021    Tdap 04/09/2015, 10/15/2018    Zoster Recombinant 12/15/2021, 05/09/2023    meningococcal Conjugate (MCV4O) 05/02/2019, 11/12/2020         ASCVD Risk Score:  Consider high-intensity statin therapy if 10-year ASCVD risk score >7.5%  The 10-year ASCVD risk score (Maria Antonia MARTIN, et al., 2019) is: 7.7%    Values used to calculate the score:      Age: 53 years      Sex: Male      Is Non- : Yes      Diabetic: No      Tobacco smoker: Yes      Systolic Blood Pressure: 120 mmHg      Is BP treated: No      HDL Cholesterol: 82 mg/dL      Total Cholesterol: 177 mg/dL    Review of Systems   Constitutional:  Negative for chills, fever, malaise/fatigue and weight loss.   HENT:  Negative for congestion and sore throat.    Eyes:  Negative for blurred vision.   Respiratory:  Negative for cough, sputum production and shortness of breath.    Cardiovascular:  Negative for chest pain, palpitations and leg swelling.   Gastrointestinal:  Negative for abdominal pain, diarrhea, nausea and vomiting.   Genitourinary:  Negative for dysuria.   Musculoskeletal:  Negative for joint pain.   Skin:  Negative for rash.   Neurological:  Negative for focal weakness, weakness and headaches.       The following portions of the patient's history were reviewed and updated as appropriate: allergies, current medications, past family history, past medical history, past social history, past surgical history and problem list.    Objective:   Vitals:  /79 (BP Location: Left arm, Patient Position: Sitting, BP Method: Medium (Automatic))   Pulse 87   Temp 98 °F (36.7 °C) (Oral)   Resp 16   Ht 5' 6" (1.676 " m)   Wt 84.4 kg (186 lb)   BMI 30.02 kg/m²  Body mass index is 30.02 kg/m².    Gen: awake, alert, NAD  HEENT: NC/AT, EOMi, anicteric sclera, no rhinorrhea, OP clear  Neck: no cervical LAD  CV: regular rate normal rhythm no murmur  Resp: easy WOB on RA CTABL no w/r/r  Abd: +BS, soft, NTTP, no HSM  Ext: warm, no LE edema  Skin: no rash  Neuro: no focal deficits       Assessment      1) HIV disease(last Cd4 1044 (37%), VL UD). Well controlled on ART  2) h/o Hepatitis C s/p treatment, last VL negative  3) h/o syphilis s/p treatment  4) High risk sexual behavior  5) Vit D def  6) Healthcare maint      Plan    - Order repeat labs today (CBC, CMP, Cd4, VL)  - Continue Odefsey 1 tab PO daily. Refills given  - Educated on importance of 100% medication compliance  - Educated on need for barrier contraception   - Reviewed vaccinations today. Up to date, no vaccines given.  - Will check Vit D level and continue Vit D supplementation  - Ordering DEXA scan  - Checking HCV VL to document continued SVR  - Anal PAP completed 5/2023. Will repeat 5/2024    RTC 3 months    Subhash Baum MD  Infectious Diseases Faculty

## 2023-09-13 LAB — HCV RNA SERPL NAA+PROBE-ACNC: NORMAL IU/ML

## 2023-09-14 LAB
AGE: 53
CD3+CD4+ CELLS # SPEC: 765.96 UNIT/L (ref 589–1505)
CD3+CD4+ CELLS NFR BLD: 55.5 %
GAMMA INTERFERON BACKGROUND BLD IA-ACNC: 0.05 IU/ML
HIV1 RNA # PLAS NAA DL=20: NORMAL COPIES/ML
LYMPHOCYTES # BLD AUTO: 1380.1 X10(3)/MCL (ref 1260–5520)
LYMPHOCYTES NFR LN MANUAL: 37 % (ref 28–48)
LYMPHOMA - T-CELL MARKERS SPEC-IMP: ABNORMAL
M TB IFN-G BLD-IMP: NEGATIVE
M TB IFN-G CD4+ BCKGRND COR BLD-ACNC: 0.03 IU/ML
M TB IFN-G CD4+CD8+ BCKGRND COR BLD-ACNC: 0.03 IU/ML
MITOGEN IGNF BCKGRD COR BLD-ACNC: 9.95 IU/ML
WBC # BLD AUTO: 3730 /MM3 (ref 4500–11500)

## 2023-10-04 ENCOUNTER — HOSPITAL ENCOUNTER (OUTPATIENT)
Dept: RADIOLOGY | Facility: HOSPITAL | Age: 53
Discharge: HOME | End: 2023-10-04
Attending: STUDENT IN AN ORGANIZED HEALTH CARE EDUCATION/TRAINING PROGRAM
Payer: MEDICARE

## 2023-10-04 DIAGNOSIS — B20 HIV DISEASE: ICD-10-CM

## 2023-10-04 PROCEDURE — 77080 DXA BONE DENSITY AXIAL SKELETON 1 OR MORE SITES: ICD-10-PCS | Mod: 26,,, | Performed by: STUDENT IN AN ORGANIZED HEALTH CARE EDUCATION/TRAINING PROGRAM

## 2023-10-04 PROCEDURE — 77080 DXA BONE DENSITY AXIAL: CPT | Mod: TC

## 2023-10-04 PROCEDURE — 77080 DXA BONE DENSITY AXIAL: CPT | Mod: 26,,, | Performed by: STUDENT IN AN ORGANIZED HEALTH CARE EDUCATION/TRAINING PROGRAM

## 2023-10-21 ENCOUNTER — TELEPHONE (OUTPATIENT)
Dept: INTERNAL MEDICINE | Facility: CLINIC | Age: 53
End: 2023-10-21
Payer: MEDICARE

## 2023-10-21 NOTE — TELEPHONE ENCOUNTER
Patient's DEXA scan showed osteopenia which is thinned bones. He is at risk for progressing to osteoporosis. I recommend he continue to take vitamin D and calcium supplements and follow up with his PCP to discuss if further treatment is needed.    Subhash Baum MD  Infectious Diseases Faculty   of Medicine

## 2023-10-23 NOTE — TELEPHONE ENCOUNTER
Attempted to call pt no answer, left voice message will send a pt portal message with the DEXA scan results.

## 2023-12-12 ENCOUNTER — OFFICE VISIT (OUTPATIENT)
Dept: INFECTIOUS DISEASES | Facility: CLINIC | Age: 53
End: 2023-12-12
Payer: MEDICARE

## 2023-12-12 VITALS
TEMPERATURE: 98 F | HEART RATE: 82 BPM | RESPIRATION RATE: 15 BRPM | DIASTOLIC BLOOD PRESSURE: 82 MMHG | WEIGHT: 185 LBS | HEIGHT: 66 IN | BODY MASS INDEX: 29.73 KG/M2 | SYSTOLIC BLOOD PRESSURE: 120 MMHG

## 2023-12-12 DIAGNOSIS — Z86.19 HEPATITIS C VIRUS INFECTION CURED AFTER ANTIVIRAL DRUG THERAPY: ICD-10-CM

## 2023-12-12 DIAGNOSIS — Z86.19 HISTORY OF SYPHILIS: ICD-10-CM

## 2023-12-12 DIAGNOSIS — B20 HIV DISEASE: Primary | ICD-10-CM

## 2023-12-12 DIAGNOSIS — Z00.00 HEALTHCARE MAINTENANCE: ICD-10-CM

## 2023-12-12 DIAGNOSIS — E55.9 VITAMIN D DEFICIENCY: ICD-10-CM

## 2023-12-12 LAB
ALBUMIN SERPL-MCNC: 3.7 G/DL (ref 3.5–5)
ALBUMIN/GLOB SERPL: 0.9 RATIO (ref 1.1–2)
ALP SERPL-CCNC: 63 UNIT/L (ref 40–150)
ALT SERPL-CCNC: 10 UNIT/L (ref 0–55)
AST SERPL-CCNC: 16 UNIT/L (ref 5–34)
BASOPHILS # BLD AUTO: 0.02 X10(3)/MCL
BASOPHILS NFR BLD AUTO: 0.4 %
BILIRUB SERPL-MCNC: 0.7 MG/DL
BUN SERPL-MCNC: 16.4 MG/DL (ref 8.4–25.7)
CALCIUM SERPL-MCNC: 8.9 MG/DL (ref 8.4–10.2)
CHLORIDE SERPL-SCNC: 109 MMOL/L (ref 98–107)
CO2 SERPL-SCNC: 21 MMOL/L (ref 22–29)
CREAT SERPL-MCNC: 0.83 MG/DL (ref 0.73–1.18)
DEPRECATED CALCIDIOL+CALCIFEROL SERPL-MC: 28.4 NG/ML (ref 30–80)
EOSINOPHIL # BLD AUTO: 0.2 X10(3)/MCL (ref 0–0.9)
EOSINOPHIL NFR BLD AUTO: 4.5 %
ERYTHROCYTE [DISTWIDTH] IN BLOOD BY AUTOMATED COUNT: 11.3 % (ref 11.5–17)
GFR SERPLBLD CREATININE-BSD FMLA CKD-EPI: >60 MLS/MIN/1.73/M2
GLOBULIN SER-MCNC: 3.9 GM/DL (ref 2.4–3.5)
GLUCOSE SERPL-MCNC: 85 MG/DL (ref 74–100)
HCT VFR BLD AUTO: 43.7 % (ref 42–52)
HGB BLD-MCNC: 14.9 G/DL (ref 14–18)
IMM GRANULOCYTES # BLD AUTO: 0 X10(3)/MCL (ref 0–0.04)
IMM GRANULOCYTES NFR BLD AUTO: 0 %
LYMPHOCYTES # BLD AUTO: 1.93 X10(3)/MCL (ref 0.6–4.6)
LYMPHOCYTES NFR BLD AUTO: 43.1 %
MCH RBC QN AUTO: 33.9 PG (ref 27–31)
MCHC RBC AUTO-ENTMCNC: 34.1 G/DL (ref 33–36)
MCV RBC AUTO: 99.5 FL (ref 80–94)
MONOCYTES # BLD AUTO: 0.51 X10(3)/MCL (ref 0.1–1.3)
MONOCYTES NFR BLD AUTO: 11.4 %
NEUTROPHILS # BLD AUTO: 1.82 X10(3)/MCL (ref 2.1–9.2)
NEUTROPHILS NFR BLD AUTO: 40.6 %
NRBC BLD AUTO-RTO: 0 %
PLATELET # BLD AUTO: 197 X10(3)/MCL (ref 130–400)
PMV BLD AUTO: 10.4 FL (ref 7.4–10.4)
POTASSIUM SERPL-SCNC: 4.1 MMOL/L (ref 3.5–5.1)
PROT SERPL-MCNC: 7.6 GM/DL (ref 6.4–8.3)
RBC # BLD AUTO: 4.39 X10(6)/MCL (ref 4.7–6.1)
RPR SER QL: NORMAL
RPR SER-TITR: NORMAL {TITER}
SODIUM SERPL-SCNC: 136 MMOL/L (ref 136–145)
T PALLIDUM AB SER QL: REACTIVE
WBC # SPEC AUTO: 4.48 X10(3)/MCL (ref 4.5–11.5)

## 2023-12-12 PROCEDURE — 86780 TREPONEMA PALLIDUM: CPT

## 2023-12-12 PROCEDURE — 86592 SYPHILIS TEST NON-TREP QUAL: CPT

## 2023-12-12 PROCEDURE — 85025 COMPLETE CBC W/AUTO DIFF WBC: CPT

## 2023-12-12 PROCEDURE — 99213 OFFICE O/P EST LOW 20 MIN: CPT | Mod: PBBFAC

## 2023-12-12 PROCEDURE — 86361 T CELL ABSOLUTE COUNT: CPT

## 2023-12-12 PROCEDURE — 87536 HIV-1 QUANT&REVRSE TRNSCRPJ: CPT

## 2023-12-12 PROCEDURE — 80053 COMPREHEN METABOLIC PANEL: CPT

## 2023-12-12 PROCEDURE — 36415 COLL VENOUS BLD VENIPUNCTURE: CPT

## 2023-12-12 PROCEDURE — 82306 VITAMIN D 25 HYDROXY: CPT

## 2023-12-12 NOTE — PROGRESS NOTES
[facility] [place] Infectious Diseases Clinic Note    Subjective:      HPI    Mr. Barry Hilton with PMH HIV disease (last Cd4 765 (55.5%), VL undetetable) who presents to ID clinic today for follow up visit. The patient is currently taking Odefsey and endorses 100% compliance. Patient states he has no acute complaints. He is currently not sexually active. His last DEXA scan showed osteopenia, he has been taking supplement vitamin D and calcium since October. He denies fevers, chills, abdominal pain, shortness of breath, chest pain, nausea, vomiting, diarrhea, or headache.         HIV history:  Diagnosed 1991 - CD4 and VL at diagnosis unknown  Risk factor - MSM  History OI - N/A  History STIs - HCV, syphilis, chlamydia  Prior ART:   - Atripla until 2017   - Odefsey since 2017-present  Genotype testing unknown              -NRTI resistance mutations: none              -NNRTI resistance mutations: none              -PI resistance mutations: none  HLA- testing negative  G6PD testing unknown    Health Screening:    The following items were screened for at today's visit:  [x] Substance Abuse  [x] Alcohol Abuse  [x] Depression  [x] Employment  [x] Housing Situation/Homelessness  [x] Travel  [x] TB Exposure   [x] Domestic Abuse   [x] Smoking Cessation    Labs  HIV Viral Load: check q3-6 months    Results for orders placed or performed in visit on 09/12/23   HIV-1 RNA, Quantitative, PCR with Reflex to Genotype   Result Value Ref Range    HIV-1 RNA Detect/Quant, P Undetected Undetected copies/mL     CD4 Count: check q3-6 months  Results for orders placed or performed in visit on 09/12/23   CD4 Lymphocytes   Result Value Ref Range    Patient Age 53     WBC Absolute 3,730 (L) 4,500 - 11,500 /mm3    Lymph Percent 37 28 - 48 %    Lymph Absolute 1,380.1 1,260 - 5,520 x10(3)/mcL    CD4 % 55.5 %    CD4 Absolute 765.9555 589 - 1,505 unit/L    T Cell Interp       Normal absolute lymphocyte count with normal absolute CD4+ lymphocyte  .    Atilio Eng M.D.     Narrative    This test was developed and its performance characteristics determined by [facility]. It has not been cleared or approved by the US Food and Drug Administration. The FDA does not require this test to go through premarket FDA review. This test is used for clinical purposes. It should not be regarded as investigational or for research. This laboratory is certified under the Clinical Laboratory Improvement Amendments (CLIA) as qualified to perform high complexity clinical laboratory testing.     CBC with Differential: check q3-6 months  Lab Results   Component Value Date    WBC 4.48 (L) 12/12/2023    RBC 4.39 (L) 12/12/2023    HGB 14.9 12/12/2023    HCT 43.7 12/12/2023    MCV 99.5 (H) 12/12/2023    MCH 33.9 (H) 12/12/2023    MCHC 34.1 12/12/2023    RDW 11.3 (L) 12/12/2023     12/12/2023    MPV 10.4 12/12/2023     CMP: check q3-6 months  Lab Results   Component Value Date     09/12/2023    K 3.8 09/12/2023    GLUCOSE 96 09/12/2023    BUN 9.2 09/12/2023    CREATININE 0.81 09/12/2023    CALCIUM 9.7 09/12/2023    BILITOT 1.3 09/12/2023    AST 19 09/12/2023    ALT 15 09/12/2023    ALKPHOS 63 09/12/2023    ALBUMIN 3.8 09/12/2023     Quantiferon: check q1yr  Results for orders placed or performed in visit on 09/12/23   Quantiferon Gold TB   Result Value Ref Range    QuantiFERON-Tb Gold Plus Result Negative Negative    TB1 Ag minus Nil Result 0.03 IU/mL    TB2 Ag minus Nil Result 0.03 IU/mL    Mitogen minus Nil Result 9.95 IU/mL    Nil Result 0.05 IU/mL     Syphilis IgG: check q1yr, unless Hx syphilis then RPR titer q1yr  Results for orders placed or performed in visit on 09/12/23   SYPHILIS ANTIBODY (WITH REFLEX RPR)   Result Value Ref Range    Syphilis Antibody Reactive (A) Nonreactive, Equivocal     Results for orders placed or performed in visit on 09/12/23   RPR   Result Value Ref Range    RPR Reactive (A) Non-Reactive    RPR  Titer 0 dils (A) (none)     Toxoplasmosis IgG: check once  No results found for this or any previous visit.  Gonorrhea: check q1yr  Results for orders placed or performed in visit on 05/09/23   C.trach/N.gonor AMP RNA   Result Value Ref Range    Chlamydia trachomatis amplified RNA Negative Negative    Neisseria gonorrhoeae amplified RNA Negative Negative    Source ORAL/THROAT     SOURCE: ORAL/THROAT      Lipid Panel: check q1yr  Lab Results   Component Value Date    TRIG 77 12/15/2021    CHOL 177 12/15/2021    HDL 82 (H) 12/15/2021    VLDL 15 12/15/2021     A1C: check q1yr  Results for orders placed or performed in visit on 04/29/21   Hemoglobin A1C   Result Value Ref Range    Estimated Average Glucose 102.5 mg/dL    Hemoglobin A1c 5.2 <<=7.0 %     Urinalysis: check q1yr, check q6mo if taking tenofovir  Lab Results   Component Value Date    PROTEINUA Negative 09/12/2023    UROBILINOGEN Normal 09/12/2023    KETONESU Negative 12/15/2021    NITRITE Negative 12/15/2021    LEUKOCYTESUR Negative 09/12/2023    COLORU LIGHT YELLOW 12/15/2021    RBCUA 0-5 09/12/2023     Glucose-6 Phosphate Dehydrogenase: check once  No results found for this or any previous visit.  Hepatitis A IgG: check once  Results for orders placed or performed in visit on 05/09/23   Hepatitis A antibody, IgG   Result Value Ref Range    Hep A IGG Reactive (A) Nonreactive     Hepatitis B Surface Antibody: check once  Results for orders placed or performed in visit on 05/09/23   Hepatitis B Surface Ab, Qualitative   Result Value Ref Range    Hepatitis B Surface Antibody Reactive (A) Nonreactive    Hepatitis B Surface Antibody Qnt 146.23 mIU/mL     Hepatitis C Antibody: check q1yr if high risk, once if low risk  Results for orders placed or performed in visit on 06/29/22   Hepatitis C Antibody   Result Value Ref Range    Hep C Ab Interp Reactive (A) Nonreactive         Vaccines  Pneumovax-23: if CD4>200 give twice q5yrs apart before age 65, then once at 65,  give >8wks from last Prevnar  Prevnar-13: if CD4>200 give once, give >1yr from last Pneumovax-23  Influenza vaccine: q1yr  HPV: Gardasil-9 at 0, 2, and 6 months for ages 15-45  Tdap: if age >35 give Td q10yrs, replace with Tdap once  MenACWY: 2 dose primary series 8 weeks apart, then 1 dose booster q5yr  Shingrix: All HIV above >17 y/o receive 2 dose series, timed 4 weeks to 6 months apart  Immunization History   Administered Date(s) Administered    COVID-19, MRNA, LN-S, PF (Pfizer) ([place]) 01/27/2022    COVID-19, vector-nr, rS-Ad26, PF ([place]) 03/23/2021    Hepatitis A / Hepatitis B 06/29/2022, 01/09/2023    Hepatitis A, Adult 09/05/2012    Hepatitis B, Adult 10/19/2011    Influenza - Quadrivalent 10/20/2016, 02/16/2017, 11/12/2020, 12/15/2021    Influenza - Quadrivalent - PF (6-35 months) 11/09/2017, 10/23/2018    Influenza - Quadrivalent - PF *Preferred* (6 months and older) 02/22/2016, 11/09/2017, 12/15/2021, 01/09/2023    Influenza - Trivalent - PF (ADULT) 10/19/2011, 01/22/2014, 12/01/2014    PPD Test 01/22/2014    Pneumococcal Conjugate - 13 Valent 12/01/2014    Pneumococcal Polysaccharide - 23 Valent 01/28/2013, 10/20/2016, 04/29/2021    Tdap 04/09/2015, 10/15/2018    Zoster Recombinant 12/15/2021, 05/09/2023    meningococcal Conjugate (MCV4O) 05/02/2019, 11/12/2020       Imaging  DEXA Scan: if age>50, check q10yrs  Results for orders placed or performed during the hospital encounter of 10/04/23   DXA Bone Density Axial Skeleton 1 or more sites    Narrative    EXAMINATION:  DXA BONE DENSITY AXIAL SKELETON 1 OR MORE SITES    CLINICAL HISTORY:  Human immunodeficiency virus (HIV) disease    TECHNIQUE:  DXA scanning was performed over bilateral hips and the lumbar spine.  Review of the images confirms satisfactory positioning and technique.    COMPARISON:  None    FINDINGS:  LUMBAR SPINE    The L1 to L4 vertebral bone mineral density is equal to 1.043 g/cm squared with a T score of -1.2.    LEFT HIP    The  left femoral neck bone mineral density is equal to 0.852 g/cm squared with a T score of -1.3.    The left total hip bone mineral density is equal to 0.911 g/cm squared with a T score of -0.8.    RIGHT HIP    The right femoral neck bone mineral density is equal to 0.813 g/cm squared with a T score of -1.6.    The right total hip bone mineral density is equal to 0.830 g/cm squared with a T score of -1.4.    FRAX 10-YEAR PROBABILITY OF FRACTURE    There is a 2.9% risk of a major osteoporotic fracture and a 0.8% risk of hip fracture in the next 10 years (FRAX).      Impression    Low bone mass.    Consider FDA approved medical therapies in postmenopausal women and men aged 50 years and older, based on the following:    *A hip or vertebral (clinical or morphometric) fracture  *T score less than or equal to -2.5 at the femoral neck or spine after appropriate evaluation to exclude secondary causes.  *Low bone mass -- also known as osteopenia (T score between -1.0 and -2.5 at the femoral neck or spine) and a 10 year probability of hip fracture greater than or equal to 3% or a 10 year probability of major osteoporosis-related fracture greater than or equal to 20% based on the [place]-adapted WHO algorithm.  *Clinicians judgment and/or patient preference may indicate treatment for people with 10 year fracture probabilities is above or below these levels.      Electronically signed by: Matt Hernandez  Date:    10/06/2023  Time:    17:01     Colonguard: check q1yr  No results found for this or any previous visit.  Abdominal Ultrasound: if age 65-75 and male and ever smoked check once, not needed if had CT abdomen  Mammogram: if female and age 50-75 check q2yrs  No results found for this or any previous visit.    Pathology:  Pap Smear: if female and age >21 check cervical pap q1yr, if male and MSM check rectal pap q1yr  Results for orders placed or performed in visit on 05/09/23   Cytology, Fluid/Wash/Brush   Result Value Ref Range  "   Pathology Result         ASCVD Risk Score:  Consider high-intensity statin therapy if 10-year ASCVD risk score >7.5%  The 10-year ASCVD risk score (Maria Antonia MARTIN, et al., 2019) is: 7.7%    Values used to calculate the score:      Age: 53 years      Sex: Male      Is Non- : Yes      Diabetic: No      Tobacco smoker: Yes      Systolic Blood Pressure: 120 mmHg      Is BP treated: No      HDL Cholesterol: 82 mg/dL      Total Cholesterol: 177 mg/dL    Review of Systems   Constitutional:  Negative for chills, fever and malaise/fatigue.   HENT:  Negative for congestion and sore throat.    Respiratory:  Negative for hemoptysis and shortness of breath.    Cardiovascular:  Negative for chest pain and claudication.   Gastrointestinal:  Negative for abdominal pain, diarrhea, nausea and vomiting.   Genitourinary:  Negative for dysuria and urgency.   Musculoskeletal:  Negative for joint pain and neck pain.   Neurological:  Negative for dizziness, sensory change, weakness and headaches.   Psychiatric/Behavioral:  Negative for depression and suicidal ideas. The patient is not nervous/anxious.      The following portions of the patient's history were reviewed and updated as appropriate: allergies, current medications, past family history, past medical history, past social history, past surgical history and problem list.    Objective:   Vitals:  /82 (BP Location: Left arm, Patient Position: Sitting, BP Method: Medium (Automatic))   Pulse 82   Temp 98.1 °F (36.7 °C)   Resp 15   Ht 5' 6" (1.676 m)   Wt 83.9 kg (185 lb)   BMI 29.86 kg/m²  Body mass index is 29.86 kg/m².    Gen: awake, alert, NAD  HEENT: NC/AT, EOMi, anicteric sclera, no rhinorrhea, OP clear  Neck: no cervical LAD  CV: regular rate normal rhythm no murmur  Resp: easy WOB on RA CTABL no w/r/r  Abd: +BS, soft, NTTP, no HSM  Ext: warm, no LE edema  Skin: no rash  Neuro: no focal deficits       Assessment      1) HIV disease, most recent " CD4 765 (55.5%) and VL undetected  on 09/2023.  -Patient on Odefsey, endorses 100% compliance. He has no acute complaints on the medication.     Plan    - Order repeat labs today (CBC, CMP, Cd4, VL)  - Checking yearly quantiferon gold. Next due:  - Continue Odefsey. Refills given  - Educated on importance of 100% medication compliance  - Educated on need for barrier contraception   - Reviewed vaccinations today. Due for flu, covid. Patient denied vaccination at this time.  - Injectable HIV medication Ant discussed with patient. Patient provided pamphlet of information on biologic, will do some independent research and make decision next visit. Patient counseled on risk/benefit of injectables for HIV management.     RTC 3 months    Jewels England, MS3  [employer]  [facility] Infectious Diseases

## 2023-12-12 NOTE — PROGRESS NOTES
[facility] Infectious Diseases Clinic Note     Subjective:      HPI     Mr. Barry Hilton with PMH HIV disease (last Cd4 765 (55.5%), VL undetetable) who presents to ID clinic today for follow up visit. The patient is currently taking Odefsey and endorses 100% compliance. Patient states he has no acute complaints. He is currently not sexually active. His last DEXA scan showed osteopenia, he has been taking supplement vitamin D and calcium since October. He denies fevers, chills, abdominal pain, shortness of breath, chest pain, nausea, vomiting, diarrhea, or headache.            HIV history:  Diagnosed 1991 - CD4 and VL at diagnosis unknown  Risk factor - MSM  History OI - N/A  History STIs - HCV, syphilis, chlamydia  Prior ART:              - Atripla until 2017              - Odefsey since 2017-present  Genotype testing unknown              -NRTI resistance mutations: none              -NNRTI resistance mutations: none              -PI resistance mutations: none  HLA- testing negative  G6PD testing unknown     Health Screening:    The following items were screened for at today's visit:  [x] Substance Abuse  [x] Alcohol Abuse  [x] Depression  [x] Employment  [x] Housing Situation/Homelessness  [x] Travel  [x] TB Exposure   [x] Domestic Abuse   [x] Smoking Cessation     Labs  HIV Viral Load: check q3-6 months            Results for orders placed or performed in visit on 09/12/23   HIV-1 RNA, Quantitative, PCR with Reflex to Genotype   Result Value Ref Range     HIV-1 RNA Detect/Quant, P Undetected Undetected copies/mL      CD4 Count: check q3-6 months         Results for orders placed or performed in visit on 09/12/23   CD4 Lymphocytes   Result Value Ref Range     Patient Age 53       WBC Absolute 3,730 (L) 4,500 - 11,500 /mm3     Lymph Percent 37 28 - 48 %     Lymph Absolute 1,380.1 1,260 - 5,520 x10(3)/mcL     CD4 % 55.5 %     CD4 Absolute 765.9555 589 - 1,505 unit/L     T Cell Interp           Normal absolute  lymphocyte count with normal absolute CD4+ lymphocyte count.     Atilio Eng M.D.      Narrative     This test was developed and its performance characteristics determined by [facility]. It has not been cleared or approved by the US Food and Drug Administration. The FDA does not require this test to go through premarket FDA review. This test is used for clinical purposes. It should not be regarded as investigational or for research. This laboratory is certified under the Clinical Laboratory Improvement Amendments (CLIA) as qualified to perform high complexity clinical laboratory testing.      CBC with Differential: check q3-6 months        Lab Results   Component Value Date     WBC 4.48 (L) 12/12/2023     RBC 4.39 (L) 12/12/2023     HGB 14.9 12/12/2023     HCT 43.7 12/12/2023     MCV 99.5 (H) 12/12/2023     MCH 33.9 (H) 12/12/2023     MCHC 34.1 12/12/2023     RDW 11.3 (L) 12/12/2023      12/12/2023     MPV 10.4 12/12/2023      CMP: check q3-6 months        Lab Results   Component Value Date      09/12/2023     K 3.8 09/12/2023     GLUCOSE 96 09/12/2023     BUN 9.2 09/12/2023     CREATININE 0.81 09/12/2023     CALCIUM 9.7 09/12/2023     BILITOT 1.3 09/12/2023     AST 19 09/12/2023     ALT 15 09/12/2023     ALKPHOS 63 09/12/2023     ALBUMIN 3.8 09/12/2023      Quantiferon: check q1yr        Results for orders placed or performed in visit on 09/12/23   Quantiferon Gold TB   Result Value Ref Range     QuantiFERON-Tb Gold Plus Result Negative Negative     TB1 Ag minus Nil Result 0.03 IU/mL     TB2 Ag minus Nil Result 0.03 IU/mL     Mitogen minus Nil Result 9.95 IU/mL     Nil Result 0.05 IU/mL      Syphilis IgG: check q1yr, unless Hx syphilis then RPR titer q1yr        Results for orders placed or performed in visit on 09/12/23   SYPHILIS ANTIBODY (WITH REFLEX RPR)   Result Value Ref Range     Syphilis Antibody Reactive (A) Nonreactive, Equivocal            Results for  orders placed or performed in visit on 09/12/23   RPR   Result Value Ref Range     RPR Reactive (A) Non-Reactive     RPR Titer 0 dils (A) (none)      Toxoplasmosis IgG: check once  No results found for this or any previous visit.  Gonorrhea: check q1yr        Results for orders placed or performed in visit on 05/09/23   C.trach/N.gonor AMP RNA   Result Value Ref Range     Chlamydia trachomatis amplified RNA Negative Negative     Neisseria gonorrhoeae amplified RNA Negative Negative     Source ORAL/THROAT       SOURCE: ORAL/THROAT        Lipid Panel: check q1yr        Lab Results   Component Value Date     TRIG 77 12/15/2021     CHOL 177 12/15/2021     HDL 82 (H) 12/15/2021     VLDL 15 12/15/2021      A1C: check q1yr        Results for orders placed or performed in visit on 04/29/21   Hemoglobin A1C   Result Value Ref Range     Estimated Average Glucose 102.5 mg/dL     Hemoglobin A1c 5.2 <<=7.0 %      Urinalysis: check q1yr, check q6mo if taking tenofovir        Lab Results   Component Value Date     PROTEINUA Negative 09/12/2023     UROBILINOGEN Normal 09/12/2023     KETONESU Negative 12/15/2021     NITRITE Negative 12/15/2021     LEUKOCYTESUR Negative 09/12/2023     COLORU LIGHT YELLOW 12/15/2021     RBCUA 0-5 09/12/2023      Glucose-6 Phosphate Dehydrogenase: check once  No results found for this or any previous visit.  Hepatitis A IgG: check once        Results for orders placed or performed in visit on 05/09/23   Hepatitis A antibody, IgG   Result Value Ref Range     Hep A IGG Reactive (A) Nonreactive      Hepatitis B Surface Antibody: check once        Results for orders placed or performed in visit on 05/09/23   Hepatitis B Surface Ab, Qualitative   Result Value Ref Range     Hepatitis B Surface Antibody Reactive (A) Nonreactive     Hepatitis B Surface Antibody Qnt 146.23 mIU/mL     Hepatitis C Antibody: check q1yr if high risk, once if low risk        Results for orders placed or performed in visit on  06/29/22   Hepatitis C Antibody   Result Value Ref Range     Hep C Ab Interp Reactive (A) Nonreactive           Vaccines  Pneumovax-23: if CD4>200 give twice q5yrs apart before age 65, then once at 65, give >8wks from last Prevnar  Prevnar-13: if CD4>200 give once, give >1yr from last Pneumovax-23  Influenza vaccine: q1yr  HPV: Gardasil-9 at 0, 2, and 6 months for ages 15-45  Tdap: if age >35 give Td q10yrs, replace with Tdap once  MenACWY: 2 dose primary series 8 weeks apart, then 1 dose booster q5yr  Shingrix: All HIV above >17 y/o receive 2 dose series, timed 4 weeks to 6 months apart       Immunization History   Administered Date(s) Administered    COVID-19, MRNA, LN-S, PF (Pfizer) ([employer]) 01/27/2022    COVID-19, vector-nr, rS-Ad26, PF [email or web address]) 03/23/2021    Hepatitis A / Hepatitis B 06/29/2022, 01/09/2023    Hepatitis A, Adult 09/05/2012    Hepatitis B, Adult 10/19/2011    Influenza - Quadrivalent 10/20/2016, 02/16/2017, 11/12/2020, 12/15/2021    Influenza - Quadrivalent - PF (6-35 months) 11/09/2017, 10/23/2018    Influenza - Quadrivalent - PF *Preferred* (6 months and older) 02/22/2016, 11/09/2017, 12/15/2021, 01/09/2023    Influenza - Trivalent - PF (ADULT) 10/19/2011, 01/22/2014, 12/01/2014    PPD Test 01/22/2014    Pneumococcal Conjugate - 13 Valent 12/01/2014    Pneumococcal Polysaccharide - 23 Valent 01/28/2013, 10/20/2016, 04/29/2021    Tdap 04/09/2015, 10/15/2018    Zoster Recombinant 12/15/2021, 05/09/2023    meningococcal Conjugate (MCV4O) 05/02/2019, 11/12/2020         Imaging  DEXA Scan: if age>50, check q10yrs      Results for orders placed or performed during the hospital encounter of 10/04/23   DXA Bone Density Axial Skeleton 1 or more sites     Narrative     EXAMINATION:  DXA BONE DENSITY AXIAL SKELETON 1 OR MORE SITES     CLINICAL HISTORY:  Human immunodeficiency virus (HIV) disease     TECHNIQUE:  DXA scanning was performed over bilateral hips and the lumbar spine.  Review of the images  confirms satisfactory positioning and technique.     COMPARISON:  None     FINDINGS:  LUMBAR SPINE     The L1 to L4 vertebral bone mineral density is equal to 1.043 g/cm squared with a T score of -1.2.     LEFT HIP     The left femoral neck bone mineral density is equal to 0.852 g/cm squared with a T score of -1.3.     The left total hip bone mineral density is equal to 0.911 g/cm squared with a T score of -0.8.     RIGHT HIP     The right femoral neck bone mineral density is equal to 0.813 g/cm squared with a T score of -1.6.     The right total hip bone mineral density is equal to 0.830 g/cm squared with a T score of -1.4.     FRAX 10-YEAR PROBABILITY OF FRACTURE     There is a 2.9% risk of a major osteoporotic fracture and a 0.8% risk of hip fracture in the next 10 years (FRAX).        Impression     Low bone mass.     Consider FDA approved medical therapies in postmenopausal women and men aged 50 years and older, based on the following:     *A hip or vertebral (clinical or morphometric) fracture  *T score less than or equal to -2.5 at the femoral neck or spine after appropriate evaluation to exclude secondary causes.  *Low bone mass -- also known as osteopenia (T score between -1.0 and -2.5 at the femoral neck or spine) and a 10 year probability of hip fracture greater than or equal to 3% or a 10 year probability of major osteoporosis-related fracture greater than or equal to 20% based on the [place]-adapted WHO algorithm.  *Clinicians judgment and/or patient preference may indicate treatment for people with 10 year fracture probabilities is above or below these levels.        Electronically signed by:       Matt Hernandez  Date:                                        10/06/2023  Time:                                       17:01      Colonguard: check q1yr  No results found for this or any previous visit.  Abdominal Ultrasound: if age 65-75 and male and ever smoked check once, not needed if had CT abdomen  Mammogram:  "if female and age 50-75 check q2yrs  No results found for this or any previous visit.     Pathology:  Pap Smear: if female and age >21 check cervical pap q1yr, if male and MSM check rectal pap q1yr        Results for orders placed or performed in visit on 05/09/23   Cytology, Fluid/Wash/Brush   Result Value Ref Range     Pathology Result             ASCVD Risk Score:  Consider high-intensity statin therapy if 10-year ASCVD risk score >7.5%  The 10-year ASCVD risk score (Maria Antonia MARTIN, et al., 2019) is: 7.7%    Values used to calculate the score:      Age: 53 years      Sex: Male      Is Non- : Yes      Diabetic: No      Tobacco smoker: Yes      Systolic Blood Pressure: 120 mmHg      Is BP treated: No      HDL Cholesterol: 82 mg/dL      Total Cholesterol: 177 mg/dL     Review of Systems   Constitutional:  Negative for chills, fever and malaise/fatigue.   HENT:  Negative for congestion and sore throat.    Respiratory:  Negative for hemoptysis and shortness of breath.    Cardiovascular:  Negative for chest pain and claudication.   Gastrointestinal:  Negative for abdominal pain, diarrhea, nausea and vomiting.   Genitourinary:  Negative for dysuria and urgency.   Musculoskeletal:  Negative for joint pain and neck pain.   Neurological:  Negative for dizziness, sensory change, weakness and headaches.   Psychiatric/Behavioral:  Negative for depression and suicidal ideas. The patient is not nervous/anxious.       The following portions of the patient's history were reviewed and updated as appropriate: allergies, current medications, past family history, past medical history, past social history, past surgical history and problem list.     Objective:   Vitals:  /82 (BP Location: Left arm, Patient Position: Sitting, BP Method: Medium (Automatic))   Pulse 82   Temp 98.1 °F (36.7 °C)   Resp 15   Ht 5' 6" (1.676 m)   Wt 83.9 kg (185 lb)   BMI 29.86 kg/m²  Body mass index is 29.86 kg/m².     Gen: " awake, alert, NAD  HEENT: NC/AT, EOMi, anicteric sclera, no rhinorrhea, OP clear  Neck: no cervical LAD  CV: regular rate normal rhythm no murmur  Resp: easy WOB on RA CTABL no w/r/r  Abd: +BS, soft, NTTP, no HSM  Ext: warm, no LE edema  Skin: no rash  Neuro: no focal deficits         Assessment       1) HIV disease, most recent CD4 765 (55.5%) and VL undetected  on 09/2023.  -Patient on Odefsey, endorses 100% compliance. He has no acute complaints on the medication.   2) Syphilis s/p treatment, more recent RPR at 0 dils  3) h/o [place] [id number]  4) Vit D def  5) HCM     Plan     - Order repeat labs today (CBC, CMP, Cd4, VL)  - Checking yearly quantiferon gold. Next due: 9/2024  - Continue Odefsey. Refills given  - Educated on importance of 100% medication compliance  - Educated on need for barrier contraception   - Reviewed vaccinations today. Due for flu, covid. Patient declined vaccination at this time.  - Injectable HIV medication Cabenuva discussed with patient. Patient provided pamphlet of information and he will do some independent research and make decision next visit. Patient counseled on risk/benefit of injectables for HIV management.      RTC 3 months     Jewels England, MS3  [employer]  [facility] Infectious Diseases      Subhash Baum MD  Infectious Diseases Faculty   of Medicine

## 2023-12-13 LAB
AGE: 53
CD3+CD4+ CELLS # SPEC: 1126 UNIT/L (ref 589–1505)
CD3+CD4+ CELLS NFR BLD: 58.3 %
LYMPHOCYTES # BLD AUTO: 1930.88 X10(3)/MCL (ref 1260–5520)
LYMPHOCYTES NFR LN MANUAL: 43.1 % (ref 28–48)
LYMPHOMA - T-CELL MARKERS SPEC-IMP: ABNORMAL
WBC # BLD AUTO: 4480 /MM3 (ref 4500–11500)

## 2023-12-14 LAB
HIV1 RNA # PLAS NAA DL=20: NORMAL COPIES/ML
T PALLIDUM AB SER QL: REACTIVE

## 2023-12-18 ENCOUNTER — TELEPHONE (OUTPATIENT)
Dept: INFECTIOUS DISEASES | Facility: CLINIC | Age: 53
End: 2023-12-18
Payer: MEDICARE

## 2023-12-18 NOTE — TELEPHONE ENCOUNTER
"Barry walked in with a form from [facility] [employer] "physician Disability Certification" asking for Dr Baum to fill out and sign for him to get into a low income apt.     Form needs to be emailed to [email or web address]  "

## 2023-12-19 NOTE — TELEPHONE ENCOUNTER
----- Message from Michaela Schuler sent at 12/19/2023  8:50 AM CST -----  Patient of Dr Baum       Patient calling for update on paperwork that needs to be filled out by provider.    Paperwork was dropped off yesterday to clinic per patient and needs to be filled out today     Please contact patient when paperwork is emailed as requested, review message from yesterday in     patient's chart.     Thanks     [phone]

## 2023-12-19 NOTE — TELEPHONE ENCOUNTER
Form filled out and emailed as per pt's request.  Form scanned to chart.    Called Barry to let know form has been emailed.

## 2024-01-17 ENCOUNTER — TELEPHONE (OUTPATIENT)
Dept: INFECTIOUS DISEASES | Facility: CLINIC | Age: 54
End: 2024-01-17
Payer: MEDICAID

## 2024-01-17 NOTE — TELEPHONE ENCOUNTER
Phoned patient. Asked about questions. States he found out that it was his PCP that had called him. He asked to excuse the call.

## 2024-03-25 DIAGNOSIS — B20 HIV DISEASE: ICD-10-CM

## 2024-03-25 RX ORDER — EMTRICITABINE, RILPIVIRINE HYDROCHLORIDE, AND TENOFOVIR ALAFENAMIDE 200; 25; 25 MG/1; MG/1; MG/1
1 TABLET ORAL DAILY
Qty: 30 TABLET | Refills: 2 | Status: SHIPPED | OUTPATIENT
Start: 2024-03-25 | End: 2024-04-29 | Stop reason: SDUPTHER

## 2024-03-25 NOTE — TELEPHONE ENCOUNTER
Last visit with Subhash Baum MD: Visit date not found  Last visit in [facility] INFECTIOUS DISEASE: 12/12/2023    Patient's next visit in [facility] INFECTIOUS DISEASE: 5/2/2024

## 2024-03-25 NOTE — TELEPHONE ENCOUNTER
Spoke to Barry told him to check with the pharmacy in one hour that ms Lucio would be sending the script.

## 2024-04-29 ENCOUNTER — OFFICE VISIT (OUTPATIENT)
Dept: INFECTIOUS DISEASES | Facility: CLINIC | Age: 54
End: 2024-04-29

## 2024-04-29 ENCOUNTER — LAB VISIT (OUTPATIENT)
Dept: LAB | Facility: HOSPITAL | Age: 54
End: 2024-04-29
Attending: STUDENT IN AN ORGANIZED HEALTH CARE EDUCATION/TRAINING PROGRAM

## 2024-04-29 VITALS
DIASTOLIC BLOOD PRESSURE: 78 MMHG | WEIGHT: 174.19 LBS | SYSTOLIC BLOOD PRESSURE: 135 MMHG | HEIGHT: 66 IN | BODY MASS INDEX: 27.99 KG/M2 | RESPIRATION RATE: 16 BRPM | TEMPERATURE: 98 F | HEART RATE: 80 BPM

## 2024-04-29 DIAGNOSIS — Z00.00 HEALTH CARE MAINTENANCE: ICD-10-CM

## 2024-04-29 DIAGNOSIS — B20 HIV DISEASE: Primary | ICD-10-CM

## 2024-04-29 DIAGNOSIS — E55.9 VITAMIN D DEFICIENCY: ICD-10-CM

## 2024-04-29 DIAGNOSIS — Z86.19 HISTORY OF SYPHILIS: ICD-10-CM

## 2024-04-29 DIAGNOSIS — Z12.9 CANCER SCREENING: ICD-10-CM

## 2024-04-29 DIAGNOSIS — B20 HIV DISEASE: ICD-10-CM

## 2024-04-29 LAB
ALBUMIN SERPL-MCNC: 4 G/DL (ref 3.5–5)
ALBUMIN/GLOB SERPL: 1 RATIO (ref 1.1–2)
ALP SERPL-CCNC: 65 UNIT/L (ref 40–150)
ALT SERPL-CCNC: 13 UNIT/L (ref 0–55)
APPEARANCE UR: CLEAR
AST SERPL-CCNC: 21 UNIT/L (ref 5–34)
BACTERIA #/AREA URNS AUTO: ABNORMAL /HPF
BASOPHILS # BLD AUTO: 0.02 X10(3)/MCL
BASOPHILS NFR BLD AUTO: 0.5 %
BILIRUB SERPL-MCNC: 1.6 MG/DL
BILIRUB UR QL STRIP.AUTO: NEGATIVE
BUN SERPL-MCNC: 7.7 MG/DL (ref 8.4–25.7)
CALCIUM SERPL-MCNC: 9.7 MG/DL (ref 8.4–10.2)
CHLORIDE SERPL-SCNC: 106 MMOL/L (ref 98–107)
CO2 SERPL-SCNC: 24 MMOL/L (ref 22–29)
COLOR UR AUTO: YELLOW
CREAT SERPL-MCNC: 0.81 MG/DL (ref 0.73–1.18)
DEPRECATED CALCIDIOL+CALCIFEROL SERPL-MC: 18 NG/ML (ref 30–80)
EOSINOPHIL # BLD AUTO: 0.05 X10(3)/MCL (ref 0–0.9)
EOSINOPHIL NFR BLD AUTO: 1.3 %
ERYTHROCYTE [DISTWIDTH] IN BLOOD BY AUTOMATED COUNT: 11.8 % (ref 11.5–17)
GFR SERPLBLD CREATININE-BSD FMLA CKD-EPI: >60 MLS/MIN/1.73/M2
GLOBULIN SER-MCNC: 4.1 GM/DL (ref 2.4–3.5)
GLUCOSE SERPL-MCNC: 81 MG/DL (ref 74–100)
GLUCOSE UR QL STRIP.AUTO: NORMAL
HCT VFR BLD AUTO: 42.6 % (ref 42–52)
HGB BLD-MCNC: 14.8 G/DL (ref 14–18)
HYALINE CASTS #/AREA URNS LPF: ABNORMAL /LPF
IMM GRANULOCYTES # BLD AUTO: 0.01 X10(3)/MCL (ref 0–0.04)
IMM GRANULOCYTES NFR BLD AUTO: 0.3 %
KETONES UR QL STRIP.AUTO: NEGATIVE
LEUKOCYTE ESTERASE UR QL STRIP.AUTO: NEGATIVE
LYMPHOCYTES # BLD AUTO: 1.8 X10(3)/MCL (ref 0.6–4.6)
LYMPHOCYTES NFR BLD AUTO: 45.8 %
MCH RBC QN AUTO: 34.4 PG (ref 27–31)
MCHC RBC AUTO-ENTMCNC: 34.7 G/DL (ref 33–36)
MCV RBC AUTO: 99.1 FL (ref 80–94)
MONOCYTES # BLD AUTO: 0.46 X10(3)/MCL (ref 0.1–1.3)
MONOCYTES NFR BLD AUTO: 11.7 %
MUCOUS THREADS URNS QL MICRO: ABNORMAL /LPF
NEUTROPHILS # BLD AUTO: 1.59 X10(3)/MCL (ref 2.1–9.2)
NEUTROPHILS NFR BLD AUTO: 40.4 %
NITRITE UR QL STRIP.AUTO: NEGATIVE
NRBC BLD AUTO-RTO: 0 %
PH UR STRIP.AUTO: 6 [PH]
PLATELET # BLD AUTO: 204 X10(3)/MCL (ref 130–400)
PMV BLD AUTO: 10.4 FL (ref 7.4–10.4)
POTASSIUM SERPL-SCNC: 3.9 MMOL/L (ref 3.5–5.1)
PROT SERPL-MCNC: 8.1 GM/DL (ref 6.4–8.3)
PROT UR QL STRIP.AUTO: NEGATIVE
RBC # BLD AUTO: 4.3 X10(6)/MCL (ref 4.7–6.1)
RBC #/AREA URNS AUTO: ABNORMAL /HPF
RBC UR QL AUTO: ABNORMAL
RPR SER QL: NORMAL
RPR SER-TITR: NORMAL {TITER}
SODIUM SERPL-SCNC: 138 MMOL/L (ref 136–145)
SP GR UR STRIP.AUTO: 1.02 (ref 1–1.03)
SQUAMOUS #/AREA URNS LPF: ABNORMAL /HPF
T PALLIDUM AB SER QL: REACTIVE
UROBILINOGEN UR STRIP-ACNC: ABNORMAL
WBC # SPEC AUTO: 3.93 X10(3)/MCL (ref 4.5–11.5)
WBC #/AREA URNS AUTO: ABNORMAL /HPF

## 2024-04-29 PROCEDURE — 86592 SYPHILIS TEST NON-TREP QUAL: CPT

## 2024-04-29 PROCEDURE — 86780 TREPONEMA PALLIDUM: CPT

## 2024-04-29 PROCEDURE — 86780 TREPONEMA PALLIDUM: CPT | Mod: 59

## 2024-04-29 PROCEDURE — 82306 VITAMIN D 25 HYDROXY: CPT

## 2024-04-29 PROCEDURE — 87536 HIV-1 QUANT&REVRSE TRNSCRPJ: CPT

## 2024-04-29 PROCEDURE — 99213 OFFICE O/P EST LOW 20 MIN: CPT | Mod: PBBFAC

## 2024-04-29 PROCEDURE — 85025 COMPLETE CBC W/AUTO DIFF WBC: CPT

## 2024-04-29 PROCEDURE — 81015 MICROSCOPIC EXAM OF URINE: CPT

## 2024-04-29 PROCEDURE — 36415 COLL VENOUS BLD VENIPUNCTURE: CPT

## 2024-04-29 PROCEDURE — 80053 COMPREHEN METABOLIC PANEL: CPT

## 2024-04-29 PROCEDURE — 86360 T CELL ABSOLUTE COUNT/RATIO: CPT

## 2024-04-29 RX ORDER — EMTRICITABINE, RILPIVIRINE HYDROCHLORIDE, AND TENOFOVIR ALAFENAMIDE 200; 25; 25 MG/1; MG/1; MG/1
1 TABLET ORAL DAILY
Qty: 30 TABLET | Refills: 2 | Status: SHIPPED | OUTPATIENT
Start: 2024-04-29 | End: 2024-07-28

## 2024-04-29 NOTE — PROGRESS NOTES
[facility] Infectious Diseases Clinic Note    Subjective:      HPI    Mr. Barry Hilton with PMH HIV disease (last Cd4 1126 (58.3%), VL undetetable) who presents to ID clinic today for follow up visit. The patient is currently taking Odefsey and endorses 100% compliance. Patient states he has no acute complaints. He is currently not sexually active. His last DEXA scan showed osteopenia, he has been taking supplement vitamin D and calcium since October of 2023. He denies fevers, chills, abdominal pain, shortness of breath, chest pain, nausea, vomiting, diarrhea, or headache.        HIV history:  Diagnosed 1991 - CD4 and VL at diagnosis unknown  Risk factor - MSM  History OI - N/A  History STIs - HCV, syphilis, chlamydia  Prior ART:              - Atripla until 2017              - Odefsey since 2017-present  Genotype testing unknown              -NRTI resistance mutations: none              -NNRTI resistance mutations: none              -PI resistance mutations: none  HLA- testing negative  G6PD testing unknown    Health Screening:    The following items were screened for at today's visit:  [x] Substance Abuse  [x] Alcohol Abuse  [x] Depression  [x] Employment  [x] Housing Situation/Homelessness  [] Travel  [] TB Exposure   [] Domestic Abuse   [] Smoking Cessation    Labs  HIV Viral Load: check q3-6 months  HIV VL: Undetectable, Date: 12/12/2023      CD4 Count: check q3-6 months  Lab Results   Component Value Date    WBCABSOLUTE 4,480 (L) 12/12/2023    LYMPHPERCENT 43.1 12/12/2023    LYMPHABSOLUT 1,930.88 12/12/2023    [id number] 1,126 12/12/2023    CD4PCT 58.3 12/12/2023    TCELLINTERP  12/12/2023     Normal absolute lymphocyte count with normal absolute CD4+ lymphocyte count.    Atilio Eng M.D.        CBC with Differential: check q3-6 months  Lab Results   Component Value Date    WBC 4.48 (L) 12/12/2023    RBC 4.39 (L) 12/12/2023    HGB 14.9 12/12/2023    HCT 43.7 12/12/2023    MCV 99.5 (H) 12/12/2023    MCH  "33.9 (H) 12/12/2023    MCHC 34.1 12/12/2023    RDW 11.3 (L) 12/12/2023     12/12/2023    MPV 10.4 12/12/2023     CMP: check q3-6 months  Lab Results   Component Value Date     12/12/2023    K 4.1 12/12/2023    GLUCOSE 85 12/12/2023    BUN 16.4 12/12/2023    CREATININE 0.83 12/12/2023    CALCIUM 8.9 12/12/2023    BILITOT 0.7 12/12/2023    AST 16 12/12/2023    ALT 10 12/12/2023    ALKPHOS 63 12/12/2023    ALBUMIN 3.7 12/12/2023     Quantiferon: check q1yr  TB Gold: 9/2023 Not Detected    Syphilis IgG: check q1yr, unless Hx syphilis then RPR titer q1yr  Lab Results   Component Value Date    SYPHAB Reactive (A) 12/12/2023    LABRPR Non-Reactive 12/12/2023    PRPQ 0 dils (A) 09/12/2023     Toxoplasmosis IgG: check once  No results found for: "TXIGMS", "TOXIGGS", "TOXIGGVL"  Gonorrhea: check q1yr  Lab Results   Component Value Date    NGONNO Not Detected 09/12/2023    LABCHLAPCR Not Detected 09/12/2023     Lipid Panel: check q1yr  Lab Results   Component Value Date    TRIG 77 12/15/2021    CHOL 177 12/15/2021    HDL 82 (H) 12/15/2021    VLDL 15 12/15/2021     A1C: check q1yr  Lab Results   Component Value Date    HGBA1C 5.2 04/29/2021     Urinalysis: check q1yr, check q6mo if taking tenofovir  Lab Results   Component Value Date    PROTEINUA Negative 09/12/2023    UROBILINOGEN Normal 09/12/2023    KETONESU Negative 12/15/2021    NITRITE Negative 12/15/2021    LEUKOCYTESUR Negative 09/12/2023    COLORU LIGHT YELLOW 12/15/2021    RBCUA 0-5 09/12/2023     Glucose-6 Phosphate Dehydrogenase: check once  No results found for: "X6CMODX"  Hepatitis A IgG: check once  Lab Results   Component Value Date    HEPAIGG Reactive (A) 05/09/2023     Hepatitis B Surface Antibody: check once  Lab Results   Component Value Date    HEPBSAB Reactive (A) 05/09/2023    HBSABQUANT 146.23 05/09/2023     Hepatitis C Antibody: check q1yr if high risk, once if low risk  Lab Results   Component Value Date    HEPCAB Reactive (A) 06/29/2022 "         Vaccines  Pneumovax-23: if CD4>200 give twice q5yrs apart before age 65, then once at 65, give >8wks from last Prevnar  Prevnar-13: if CD4>200 give once, give >1yr from last Pneumovax-23  Influenza vaccine: q1yr  HPV: Gardasil-9 at 0, 2, and 6 months for ages 15-45  Tdap: if age >35 give Td q10yrs, replace with Tdap once  MenACWY: 2 dose primary series 8 weeks apart, then 1 dose booster q5yr  Shingrix: All HIV above >17 y/o receive 2 dose series, timed 4 weeks to 6 months apart  Immunization History   Administered Date(s) Administered    COVID-19, MRNA, LN-S, PF (Pfizer) (Gray Cap) 01/27/2022    COVID-19, vector-nr, rS-Ad26, PF (Jamalon) 03/23/2021    Hepatitis A / Hepatitis B 06/29/2022, 01/09/2023    Hepatitis A, Adult 09/05/2012    Hepatitis B, Adult 10/19/2011    Influenza - Quadrivalent 10/20/2016, 02/16/2017, 11/12/2020, 12/15/2021    Influenza - Quadrivalent - PF (6-35 months) 11/09/2017, 10/23/2018    Influenza - Quadrivalent - PF *Preferred* (6 months and older) 02/22/2016, 11/09/2017, 12/15/2021, 01/09/2023    Influenza - Trivalent - PF (ADULT) 10/19/2011, 01/22/2014, 12/01/2014    Meningococcal Conjugate (MCV4O) 2 Vial (2mo-55yr) 05/02/2019, 11/12/2020    PPD Test 01/22/2014    Pneumococcal Conjugate - 13 Valent 12/01/2014    Pneumococcal Polysaccharide - 23 Valent 01/28/2013, 10/20/2016, 04/29/2021    Tdap 04/09/2015, 10/15/2018    Zoster Recombinant 12/15/2021, 05/09/2023       Imaging  DEXA Scan: if age>50, check q10yrs  Results for orders placed or performed during the hospital encounter of 10/04/23   DXA Bone Density Axial Skeleton 1 or more sites    Narrative    EXAMINATION:  DXA BONE DENSITY AXIAL SKELETON 1 OR MORE SITES    CLINICAL HISTORY:  Human immunodeficiency virus (HIV) disease    TECHNIQUE:  DXA scanning was performed over bilateral hips and the lumbar spine.  Review of the images confirms satisfactory positioning and technique.    COMPARISON:  None    FINDINGS:  LUMBAR  "SPINE    The L1 to L4 vertebral bone mineral density is equal to 1.043 g/cm squared with a T score of -1.2.    LEFT HIP    The left femoral neck bone mineral density is equal to 0.852 g/cm squared with a T score of -1.3.    The left total hip bone mineral density is equal to 0.911 g/cm squared with a T score of -0.8.    RIGHT HIP    The right femoral neck bone mineral density is equal to 0.813 g/cm squared with a T score of -1.6.    The right total hip bone mineral density is equal to 0.830 g/cm squared with a T score of -1.4.    FRAX 10-YEAR PROBABILITY OF FRACTURE    There is a 2.9% risk of a major osteoporotic fracture and a 0.8% risk of hip fracture in the next 10 years (FRAX).      Impression    Low bone mass.    Consider FDA approved medical therapies in postmenopausal women and men aged 50 years and older, based on the following:    *A hip or vertebral (clinical or morphometric) fracture  *T score less than or equal to -2.5 at the femoral neck or spine after appropriate evaluation to exclude secondary causes.  *Low bone mass -- also known as osteopenia (T score between -1.0 and -2.5 at the femoral neck or spine) and a 10 year probability of hip fracture greater than or equal to 3% or a 10 year probability of major osteoporosis-related fracture greater than or equal to 20% based on the [place]-adapted WHO algorithm.  *Clinicians judgment and/or patient preference may indicate treatment for people with 10 year fracture probabilities is above or below these levels.      Electronically signed by: Matt Hernandez  Date:    10/06/2023  Time:    17:01       Pathology:  Pap Smear: if female and age >21 check cervical pap q1yr, if male and MSM check rectal pap q1yr  No results found for: "PAP"    ASCVD Risk Score:  Consider high-intensity statin therapy if 10-year ASCVD risk score >7.5%  The 10-year ASCVD risk score (Maria Antonia MARTIN, et al., 2019) is: 9.5%    Values used to calculate the score:      Age: 53 years      Sex: Male   " "   Is Non- : Yes      Diabetic: No      Tobacco smoker: Yes      Systolic Blood Pressure: 135 mmHg      Is BP treated: No      HDL Cholesterol: 82 mg/dL      Total Cholesterol: 177 mg/dL    Review of Systems   Constitutional:  Negative for chills, fever, malaise/fatigue and weight loss.   HENT:  Negative for congestion and sore throat.    Eyes:  Negative for blurred vision.   Respiratory:  Negative for cough, sputum production and shortness of breath.    Cardiovascular:  Negative for chest pain, palpitations and leg swelling.   Gastrointestinal:  Negative for abdominal pain, diarrhea, nausea and vomiting.   Genitourinary:  Negative for dysuria.   Musculoskeletal:  Negative for joint pain.   Skin:  Negative for rash.   Neurological:  Negative for focal weakness, weakness and headaches.       The following portions of the patient's history were reviewed and updated as appropriate: allergies, current medications, past family history, past medical history, past social history, past surgical history and problem list.    Objective:   Vitals:  /78 (BP Location: Left arm, Patient Position: Sitting, BP Method: Medium (Automatic))   Pulse 80   Temp 98.1 °F (36.7 °C) (Oral)   Resp 16   Ht 5' 6" (1.676 m)   Wt 79 kg (174 lb 2.6 oz)   BMI 28.11 kg/m²  Body mass index is 28.11 kg/m².    Gen: awake, alert, NAD  HEENT: NC/AT, EOMi, anicteric sclera, no rhinorrhea, OP clear  Neck: no cervical LAD  CV: regular rate normal rhythm no murmur  Resp: easy WOB on RA CTABL no w/r/r  Abd: +BS, soft, NTTP, no HSM  Ext: warm, no LE edema  Skin: no rash  Neuro: no focal deficits       Assessment      1) HIV disease, most recent CD4 1126 (58.3%) and VL undetected  on 12/2023.  -Patient on Odefsey, endorses 100% compliance. He has no acute complaints on the medication.   2) Syphilis s/p treatment, most recent RPR nonreactive  3) h/o HCV s/p treatment with Harvoni in 2018  4) Vit D def  5) HCM    Plan    - Order " repeat labs today (CBC, CMP, Cd4, VL)  - Ordering Syphilis, UA  - Anal PAP: due 5/2024, pt deferred until next visit  - Checking yearly quantiferon gold. Next due: 9/2024  - Continue Odefsey. Refills given  - Educated on importance of 100% medication compliance  - Educated on need for barrier contraception   - Reviewed vaccinations today. Up to date.  - Injectable HIV medication Cabenuva discussed with patient. Pt would like to continue Odefsey at this time, will continue to consider cabenuva. Patient counseled on risk/benefit of injectables for HIV management.     RTC 3 months      Attending attestation    I hereby acknowledge that I am relying upon documentation authored by a medical student working under my supervision and further I hereby attest that I have verified the student documentation or findings by personally performing the physical exam and medical decision making activities of the Evaluation and Management service to be billed. Note has been edited to reflect updated assessment and plan.    Subhash Baum MD  Infectious Diseases Faculty   of Medicine

## 2024-04-29 NOTE — PROGRESS NOTES
[facility] Infectious Diseases Clinic Note    Subjective:      HPI    Mr. Barry Hilton with PMH HIV disease (last Cd4 1126 (58.3%), VL undetetable) who presents to ID clinic today for follow up visit. The patient is currently taking Odefsey and endorses 100% compliance. Patient states he has no acute complaints. He is currently not sexually active. His last DEXA scan showed osteopenia, he has been taking supplement vitamin D and calcium since October of 2023. He denies fevers, chills, abdominal pain, shortness of breath, chest pain, nausea, vomiting, diarrhea, or headache.        HIV history:  Diagnosed 1991 - CD4 and VL at diagnosis unknown  Risk factor - MSM  History OI - N/A  History STIs - HCV, syphilis, chlamydia  Prior ART:              - Atripla until 2017              - Odefsey since 2017-present  Genotype testing unknown              -NRTI resistance mutations: none              -NNRTI resistance mutations: none              -PI resistance mutations: none  HLA- testing negative  G6PD testing unknown    Health Screening:    The following items were screened for at today's visit:  [x] Substance Abuse  [x] Alcohol Abuse  [x] Depression  [x] Employment  [x] Housing Situation/Homelessness  [] Travel  [] TB Exposure   [] Domestic Abuse   [] Smoking Cessation    Labs  HIV Viral Load: check q3-6 months  HIV VL: Undetectable, Date: 12/12/2023      CD4 Count: check q3-6 months  Lab Results   Component Value Date    WBCABSOLUTE 4,480 (L) 12/12/2023    LYMPHPERCENT 43.1 12/12/2023    LYMPHABSOLUT 1,930.88 12/12/2023    [id number] 1,126 12/12/2023    CD4PCT 58.3 12/12/2023    TCELLINTERP  12/12/2023     Normal absolute lymphocyte count with normal absolute CD4+ lymphocyte count.    Atilio Eng M.D.        CBC with Differential: check q3-6 months  Lab Results   Component Value Date    WBC 4.48 (L) 12/12/2023    RBC 4.39 (L) 12/12/2023    HGB 14.9 12/12/2023    HCT 43.7 12/12/2023    MCV 99.5 (H) 12/12/2023    MCH  "33.9 (H) 12/12/2023    MCHC 34.1 12/12/2023    RDW 11.3 (L) 12/12/2023     12/12/2023    MPV 10.4 12/12/2023     CMP: check q3-6 months  Lab Results   Component Value Date     12/12/2023    K 4.1 12/12/2023    GLUCOSE 85 12/12/2023    BUN 16.4 12/12/2023    CREATININE 0.83 12/12/2023    CALCIUM 8.9 12/12/2023    BILITOT 0.7 12/12/2023    AST 16 12/12/2023    ALT 10 12/12/2023    ALKPHOS 63 12/12/2023    ALBUMIN 3.7 12/12/2023     Quantiferon: check q1yr  TB Gold: 9/12/23 Not Detected    Syphilis IgG: check q1yr, unless Hx syphilis then RPR titer q1yr  Lab Results   Component Value Date    SYPHAB Reactive (A) 12/12/2023    LABRPR Non-Reactive 12/12/2023    PRPQ 0 dils (A) 09/12/2023     Toxoplasmosis IgG: check once  No results found for: "TXIGMS", "TOXIGGS", "TOXIGGVL"  Gonorrhea: check q1yr  Lab Results   Component Value Date    NGONNO Not Detected 09/12/2023    LABCHLAPCR Not Detected 09/12/2023     Lipid Panel: check q1yr  Lab Results   Component Value Date    TRIG 77 12/15/2021    CHOL 177 12/15/2021    HDL 82 (H) 12/15/2021    VLDL 15 12/15/2021     A1C: check q1yr  Lab Results   Component Value Date    HGBA1C 5.2 04/29/2021     Urinalysis: check q1yr, check q6mo if taking tenofovir  Lab Results   Component Value Date    PROTEINUA Negative 09/12/2023    UROBILINOGEN Normal 09/12/2023    KETONESU Negative 12/15/2021    NITRITE Negative 12/15/2021    LEUKOCYTESUR Negative 09/12/2023    COLORU LIGHT YELLOW 12/15/2021    RBCUA 0-5 09/12/2023     Glucose-6 Phosphate Dehydrogenase: check once  No results found for: "T9JPIJV"  Hepatitis A IgG: check once  Lab Results   Component Value Date    HEPAIGG Reactive (A) 05/09/2023     Hepatitis B Surface Antibody: check once  Lab Results   Component Value Date    HEPBSAB Reactive (A) 05/09/2023    HBSABQUANT 146.23 05/09/2023     Hepatitis C Antibody: check q1yr if high risk, once if low risk  Lab Results   Component Value Date    HEPCAB Reactive (A) 06/29/2022 "         Vaccines  Pneumovax-23: if CD4>200 give twice q5yrs apart before age 65, then once at 65, give >8wks from last Prevnar  Prevnar-13: if CD4>200 give once, give >1yr from last Pneumovax-23  Influenza vaccine: q1yr  HPV: Gardasil-9 at 0, 2, and 6 months for ages 15-45  Tdap: if age >35 give Td q10yrs, replace with Tdap once  MenACWY: 2 dose primary series 8 weeks apart, then 1 dose booster q5yr  Shingrix: All HIV above >17 y/o receive 2 dose series, timed 4 weeks to 6 months apart  Immunization History   Administered Date(s) Administered    COVID-19, MRNA, LN-S, PF (Pfizer) (Gray Cap) 01/27/2022    COVID-19, vector-nr, rS-Ad26, PF (Nutzvieh24) 03/23/2021    Hepatitis A / Hepatitis B 06/29/2022, 01/09/2023    Hepatitis A, Adult 09/05/2012    Hepatitis B, Adult 10/19/2011    Influenza - Quadrivalent 10/20/2016, 02/16/2017, 11/12/2020, 12/15/2021    Influenza - Quadrivalent - PF (6-35 months) 11/09/2017, 10/23/2018    Influenza - Quadrivalent - PF *Preferred* (6 months and older) 02/22/2016, 11/09/2017, 12/15/2021, 01/09/2023    Influenza - Trivalent - PF (ADULT) 10/19/2011, 01/22/2014, 12/01/2014    Meningococcal Conjugate (MCV4O) 2 Vial (2mo-55yr) 05/02/2019, 11/12/2020    PPD Test 01/22/2014    Pneumococcal Conjugate - 13 Valent 12/01/2014    Pneumococcal Polysaccharide - 23 Valent 01/28/2013, 10/20/2016, 04/29/2021    Tdap 04/09/2015, 10/15/2018    Zoster Recombinant 12/15/2021, 05/09/2023       Imaging  DEXA Scan: if age>50, check q10yrs  Results for orders placed or performed during the hospital encounter of 10/04/23   DXA Bone Density Axial Skeleton 1 or more sites    Narrative    EXAMINATION:  DXA BONE DENSITY AXIAL SKELETON 1 OR MORE SITES    CLINICAL HISTORY:  Human immunodeficiency virus (HIV) disease    TECHNIQUE:  DXA scanning was performed over bilateral hips and the lumbar spine.  Review of the images confirms satisfactory positioning and technique.    COMPARISON:  None    FINDINGS:  LUMBAR  "SPINE    The L1 to L4 vertebral bone mineral density is equal to 1.043 g/cm squared with a T score of -1.2.    LEFT HIP    The left femoral neck bone mineral density is equal to 0.852 g/cm squared with a T score of -1.3.    The left total hip bone mineral density is equal to 0.911 g/cm squared with a T score of -0.8.    RIGHT HIP    The right femoral neck bone mineral density is equal to 0.813 g/cm squared with a T score of -1.6.    The right total hip bone mineral density is equal to 0.830 g/cm squared with a T score of -1.4.    FRAX 10-YEAR PROBABILITY OF FRACTURE    There is a 2.9% risk of a major osteoporotic fracture and a 0.8% risk of hip fracture in the next 10 years (FRAX).      Impression    Low bone mass.    Consider FDA approved medical therapies in postmenopausal women and men aged 50 years and older, based on the following:    *A hip or vertebral (clinical or morphometric) fracture  *T score less than or equal to -2.5 at the femoral neck or spine after appropriate evaluation to exclude secondary causes.  *Low bone mass -- also known as osteopenia (T score between -1.0 and -2.5 at the femoral neck or spine) and a 10 year probability of hip fracture greater than or equal to 3% or a 10 year probability of major osteoporosis-related fracture greater than or equal to 20% based on the [place]-adapted WHO algorithm.  *Clinicians judgment and/or patient preference may indicate treatment for people with 10 year fracture probabilities is above or below these levels.      Electronically signed by: Matt Hernandez  Date:    10/06/2023  Time:    17:01       Pathology:  Pap Smear: if female and age >21 check cervical pap q1yr, if male and MSM check rectal pap q1yr  No results found for: "PAP"    ASCVD Risk Score:  Consider high-intensity statin therapy if 10-year ASCVD risk score >7.5%  The 10-year ASCVD risk score (Maria Antonia MARTIN, et al., 2019) is: 7%    Values used to calculate the score:      Age: 53 years      Sex: Male     "  Is Non- : Yes      Diabetic: No      Tobacco smoker: Yes      Systolic Blood Pressure: 113 mmHg      Is BP treated: No      HDL Cholesterol: 82 mg/dL      Total Cholesterol: 177 mg/dL    ROS    The following portions of the patient's history were reviewed and updated as appropriate: allergies, current medications, past family history, past medical history, past social history, past surgical history and problem list.    Objective:   Vitals:  There were no vitals taken for this visit. There is no height or weight on file to calculate BMI.    Gen: awake, alert, NAD  HEENT: NC/AT, EOMi, anicteric sclera, no rhinorrhea, OP clear  Neck: no cervical LAD  CV: regular rate normal rhythm no murmur  Resp: easy WOB on RA CTABL no w/r/r  Abd: +BS, soft, NTTP, no HSM  Ext: warm, no LE edema  Skin: no rash  Neuro: no focal deficits       Assessment      1) HIV disease, most recent CD4 1126 (58.3%) and VL undetected  on 12/2023.  -Patient on Odefsey, endorses 100% compliance. He has no acute complaints on the medication.   2) Syphilis s/p treatment, more recent RPR nonreactive  3) h/o HCV s/p treatment with Harvoni in [id number]  4) Vit D def  5) HCM    Plan    - Order repeat labs today (CBC, CMP, Cd4, VL)  - Ordering Syphilis, UA  - Anal PAP: due 5/2024, pt would like to wait until next visit  - Checking yearly quantiferon gold. Next due: 9/2024  - Continue Odefsey. Refills given  - Educated on importance of 100% medication compliance  - Educated on need for barrier contraception   - Reviewed vaccinations today. None due  - Injectable HIV medication Cabenuva discussed with patient. Pt would like to continue Odefsey at this time, will continue to consider cabenuva. Patient counseled on risk/benefit of injectables for HIV management.     RTC 3 months

## 2024-04-30 LAB
CD3 CELLS # BLD: 1600 CELLS/MCL (ref 550–2202)
CD3 CELLS NFR BLD: 89 % (ref 58–86)
CD3+CD4+ CELLS # BLD: 1027 CELLS/MCL (ref 365–1437)
CD3+CD4+ CELLS NFR BLD: 57 % (ref 32–64)
CD3+CD4+ CELLS/CD3+CD8+ CLL BLD: 1.8 %
CD3+CD8+ CELLS # BLD: 569 CELLS/MCL (ref 117–846)
CD3+CD8+ CELLS NFR BLD: 32 % (ref 11–40)
CD45 CELLS # BLD: 1.8 THOU/MCL (ref 0.82–2.84)

## 2024-05-01 ENCOUNTER — TELEPHONE (OUTPATIENT)
Dept: INFECTIOUS DISEASES | Facility: CLINIC | Age: 54
End: 2024-05-01
Payer: MEDICAID

## 2024-05-01 LAB
HIV1 RNA # PLAS NAA DL=20: NORMAL COPIES/ML
T PALLIDUM AB SER QL AGGL: POSITIVE

## 2024-05-01 RX ORDER — ERGOCALCIFEROL 1.25 MG/1
50000 CAPSULE ORAL
Qty: 12 CAPSULE | Refills: 0 | Status: SHIPPED | OUTPATIENT
Start: 2024-05-01 | End: 2024-07-30

## 2024-05-01 NOTE — TELEPHONE ENCOUNTER
Please let the patient know that his HIV labs look great. His Vit D level is low so I recommend we restart supplementation for this. I sent a script to [facility] for Vit D 50,000 units weekly for 3 months. We can check the level again at his next visit.    Subhash Baum MD  Infectious Diseases Faculty   of Medicine

## 2024-09-17 ENCOUNTER — LAB VISIT (OUTPATIENT)
Dept: LAB | Facility: HOSPITAL | Age: 54
End: 2024-09-17
Payer: MEDICARE

## 2024-09-17 ENCOUNTER — OFFICE VISIT (OUTPATIENT)
Dept: INFECTIOUS DISEASES | Facility: CLINIC | Age: 54
End: 2024-09-17
Payer: MEDICARE

## 2024-09-17 ENCOUNTER — TELEPHONE (OUTPATIENT)
Dept: INFECTIOUS DISEASES | Facility: CLINIC | Age: 54
End: 2024-09-17

## 2024-09-17 VITALS
BODY MASS INDEX: 29.57 KG/M2 | SYSTOLIC BLOOD PRESSURE: 134 MMHG | DIASTOLIC BLOOD PRESSURE: 84 MMHG | RESPIRATION RATE: 16 BRPM | HEART RATE: 69 BPM | HEIGHT: 66 IN | TEMPERATURE: 98 F | WEIGHT: 184 LBS

## 2024-09-17 DIAGNOSIS — E55.9 VITAMIN D DEFICIENCY: ICD-10-CM

## 2024-09-17 DIAGNOSIS — Z23 IMMUNIZATION DUE: ICD-10-CM

## 2024-09-17 DIAGNOSIS — Z00.00 HEALTH CARE MAINTENANCE: ICD-10-CM

## 2024-09-17 DIAGNOSIS — B20 HIV DISEASE: Primary | ICD-10-CM

## 2024-09-17 DIAGNOSIS — B20 HIV DISEASE: ICD-10-CM

## 2024-09-17 LAB
25(OH)D3+25(OH)D2 SERPL-MCNC: 33 NG/ML (ref 30–80)
ALBUMIN SERPL-MCNC: 3.8 G/DL (ref 3.5–5)
ALBUMIN/GLOB SERPL: 1 RATIO (ref 1.1–2)
ALP SERPL-CCNC: 63 UNIT/L (ref 40–150)
ALT SERPL-CCNC: 13 UNIT/L (ref 0–55)
ANION GAP SERPL CALC-SCNC: 8 MEQ/L
AST SERPL-CCNC: 17 UNIT/L (ref 5–34)
BACTERIA #/AREA URNS AUTO: ABNORMAL /HPF
BASOPHILS # BLD AUTO: 0.02 X10(3)/MCL
BASOPHILS NFR BLD AUTO: 0.5 %
BILIRUB SERPL-MCNC: 0.4 MG/DL
BILIRUB UR QL STRIP.AUTO: NEGATIVE
BUN SERPL-MCNC: 14.6 MG/DL (ref 8.4–25.7)
CALCIUM SERPL-MCNC: 9.3 MG/DL (ref 8.4–10.2)
CHLORIDE SERPL-SCNC: 107 MMOL/L (ref 98–107)
CLARITY UR: CLEAR
CO2 SERPL-SCNC: 23 MMOL/L (ref 22–29)
COLOR UR AUTO: COLORLESS
CREAT SERPL-MCNC: 0.81 MG/DL (ref 0.73–1.18)
CREAT/UREA NIT SERPL: 18
EOSINOPHIL # BLD AUTO: 0.1 X10(3)/MCL (ref 0–0.9)
EOSINOPHIL NFR BLD AUTO: 2.5 %
ERYTHROCYTE [DISTWIDTH] IN BLOOD BY AUTOMATED COUNT: 11.9 % (ref 11.5–17)
GFR SERPLBLD CREATININE-BSD FMLA CKD-EPI: >60 ML/MIN/1.73/M2
GLOBULIN SER-MCNC: 3.9 GM/DL (ref 2.4–3.5)
GLUCOSE SERPL-MCNC: 95 MG/DL (ref 74–100)
GLUCOSE UR QL STRIP: NORMAL
HCT VFR BLD AUTO: 41.9 % (ref 42–52)
HGB BLD-MCNC: 14.6 G/DL (ref 14–18)
HGB UR QL STRIP: NEGATIVE
HYALINE CASTS #/AREA URNS LPF: ABNORMAL /LPF
IMM GRANULOCYTES # BLD AUTO: 0.01 X10(3)/MCL (ref 0–0.04)
IMM GRANULOCYTES NFR BLD AUTO: 0.2 %
KETONES UR QL STRIP: NEGATIVE
LEUKOCYTE ESTERASE UR QL STRIP: NEGATIVE
LYMPHOCYTES # BLD AUTO: 1.68 X10(3)/MCL (ref 0.6–4.6)
LYMPHOCYTES NFR BLD AUTO: 41.2 %
MCH RBC QN AUTO: 35.3 PG (ref 27–31)
MCHC RBC AUTO-ENTMCNC: 34.8 G/DL (ref 33–36)
MCV RBC AUTO: 101.2 FL (ref 80–94)
MONOCYTES # BLD AUTO: 0.46 X10(3)/MCL (ref 0.1–1.3)
MONOCYTES NFR BLD AUTO: 11.3 %
NEUTROPHILS # BLD AUTO: 1.81 X10(3)/MCL (ref 2.1–9.2)
NEUTROPHILS NFR BLD AUTO: 44.3 %
NITRITE UR QL STRIP: NEGATIVE
NRBC BLD AUTO-RTO: 0 %
PH UR STRIP: 6 [PH]
PLATELET # BLD AUTO: 234 X10(3)/MCL (ref 130–400)
PMV BLD AUTO: 10.9 FL (ref 7.4–10.4)
POTASSIUM SERPL-SCNC: 4.2 MMOL/L (ref 3.5–5.1)
PROT SERPL-MCNC: 7.7 GM/DL (ref 6.4–8.3)
PROT UR QL STRIP: NEGATIVE
RBC # BLD AUTO: 4.14 X10(6)/MCL (ref 4.7–6.1)
RBC #/AREA URNS AUTO: ABNORMAL /HPF
RPR SER QL: NORMAL
SODIUM SERPL-SCNC: 138 MMOL/L (ref 136–145)
SP GR UR STRIP.AUTO: 1.02 (ref 1–1.03)
SQUAMOUS #/AREA URNS LPF: ABNORMAL /HPF
T PALLIDUM AB SER QL: REACTIVE
UROBILINOGEN UR STRIP-ACNC: NORMAL
WBC # BLD AUTO: 4.08 X10(3)/MCL (ref 4.5–11.5)
WBC #/AREA URNS AUTO: ABNORMAL /HPF

## 2024-09-17 PROCEDURE — 86360 T CELL ABSOLUTE COUNT/RATIO: CPT

## 2024-09-17 PROCEDURE — 36415 COLL VENOUS BLD VENIPUNCTURE: CPT

## 2024-09-17 PROCEDURE — 80053 COMPREHEN METABOLIC PANEL: CPT

## 2024-09-17 PROCEDURE — 86780 TREPONEMA PALLIDUM: CPT

## 2024-09-17 PROCEDURE — 87536 HIV-1 QUANT&REVRSE TRNSCRPJ: CPT

## 2024-09-17 PROCEDURE — 82306 VITAMIN D 25 HYDROXY: CPT

## 2024-09-17 PROCEDURE — 86480 TB TEST CELL IMMUN MEASURE: CPT

## 2024-09-17 PROCEDURE — 81001 URINALYSIS AUTO W/SCOPE: CPT

## 2024-09-17 PROCEDURE — G0008 ADMIN INFLUENZA VIRUS VAC: HCPCS | Mod: PBBFAC

## 2024-09-17 PROCEDURE — 86592 SYPHILIS TEST NON-TREP QUAL: CPT

## 2024-09-17 PROCEDURE — 85025 COMPLETE CBC W/AUTO DIFF WBC: CPT

## 2024-09-17 PROCEDURE — 99213 OFFICE O/P EST LOW 20 MIN: CPT | Mod: PBBFAC,25

## 2024-09-17 PROCEDURE — 90656 IIV3 VACC NO PRSV 0.5 ML IM: CPT | Mod: PBBFAC

## 2024-09-17 RX ORDER — ACETAMINOPHEN 500 MG
2000 TABLET ORAL DAILY
COMMUNITY
Start: 2024-09-05

## 2024-09-17 RX ORDER — IBUPROFEN 800 MG/1
800 TABLET ORAL EVERY 6 HOURS PRN
COMMUNITY
Start: 2024-06-26

## 2024-09-17 RX ORDER — DICLOFENAC SODIUM 10 MG/G
4 GEL TOPICAL 4 TIMES DAILY PRN
COMMUNITY
Start: 2024-06-09

## 2024-09-17 RX ORDER — EMTRICITABINE, RILPIVIRINE HYDROCHLORIDE, AND TENOFOVIR ALAFENAMIDE 200; 25; 25 MG/1; MG/1; MG/1
1 TABLET ORAL DAILY
Qty: 30 TABLET | Refills: 5 | Status: SHIPPED | OUTPATIENT
Start: 2024-09-17 | End: 2025-03-16

## 2024-09-17 RX ADMIN — INFLUENZA VIRUS VACCINE 0.5 ML: 15; 15; 15 SUSPENSION INTRAMUSCULAR at 09:09

## 2024-09-17 NOTE — TELEPHONE ENCOUNTER
Spoke to Barry, he is no longer here at [facility] and not sure when he can return to recollect anal pap.  He will call me back.  (Swab was sent to lab in wrong container)

## 2024-09-17 NOTE — PROGRESS NOTES
[facility] Infectious Diseases Clinic Note    Subjective:      HPI    Mr. Barry Hilton with PMH HIV disease (last Cd4 1027(57%), VL undetetable) who presents to ID clinic today for follow up visit. The patient is currently taking Odefsey and endorses 100% compliance. No difficulty getting medications. Reports right hand pain which is chronic secondary to carpal tunnel.  No acute complaints. He is still not sexually active. His last DEXA scan showed osteopenia, he has been taking supplement vitamin D and calcium since October of 2023. He denies fevers, chills, abdominal pain, shortness of breath, chest pain, nausea, vomiting, diarrhea, or headache.        HIV history:  Diagnosed 1991 - CD4 and VL at diagnosis unknown  Risk factor - MSM  History OI - N/A  History STIs - HCV, syphilis, chlamydia  Prior ART:              - Atripla until 2017              - Odefsey since 2017-present  Genotype testing unknown              -NRTI resistance mutations: none              -NNRTI resistance mutations: none              -PI resistance mutations: none  HLA- testing negative  G6PD testing unknown    Health Screening:    The following items were screened for at today's visit:  [x] Substance Abuse  [x] Alcohol Abuse  [x] Depression  [x] Employment  [x] Housing Situation/Homelessness  [] Travel  [] TB Exposure   [] Domestic Abuse   [] Smoking Cessation    Labs  HIV Viral Load: check q3-6 months  HIV VL: Undetectable, Date: 4/29/2024      CD4 Count: check q3-6 months  Lab Results   Component Value Date    WBCABSOLUTE 4,480 (L) 12/12/2023    LYMPHPERCENT 43.1 12/12/2023    LYMPHABSOLUT 1,930.88 12/12/2023    AN7BEGSIXAB 1,126 12/12/2023    TCELLINTERP  12/12/2023     Normal absolute lymphocyte count with normal absolute CD4+ lymphocyte count.    Atilio Eng M.D.        CBC with Differential: check q3-6 months  Lab Results   Component Value Date    WBC 3.93 (L) 04/29/2024    RBC 4.30 (L) 04/29/2024    HGB 14.8 04/29/2024    HCT  "42.6 04/29/2024    MCV 99.1 (H) 04/29/2024    MCH 34.4 (H) 04/29/2024    MCHC 34.7 04/29/2024    RDW 11.8 04/29/2024     04/29/2024    MPV 10.4 04/29/2024     CMP: check q3-6 months  Lab Results   Component Value Date     04/29/2024    K 3.9 04/29/2024     04/29/2024    GLUCOSE 81 04/29/2024    BUN 7.7 (L) 04/29/2024    CREATININE 0.81 04/29/2024    CALCIUM 9.7 04/29/2024    BILITOT 1.6 (H) 04/29/2024    AST 21 04/29/2024    ALT 13 04/29/2024    ALKPHOS 65 04/29/2024    ALBUMIN 4.0 04/29/2024     Quantiferon: check q1yr  TB Gold: 9/2023 Not Detected    Syphilis IgG: check q1yr, unless Hx syphilis then RPR titer q1yr  Lab Results   Component Value Date    SYPHAB Reactive (A) 04/29/2024    LABRPR Non-Reactive 04/29/2024    PRPQ 0 dils (A) 09/12/2023     Toxoplasmosis IgG: check once  No results found for: "TXIGMS", "TOXIGGS", "TOXIGGVL"  Gonorrhea: check q1yr  Lab Results   Component Value Date    NGONNO Not Detected 09/12/2023    LABCHLAPCR Not Detected 09/12/2023     Lipid Panel: check q1yr  Lab Results   Component Value Date    TRIG 77 12/15/2021    CHOL 177 12/15/2021    HDL 82 (H) 12/15/2021    VLDL 15 12/15/2021     A1C: check q1yr  Lab Results   Component Value Date    HGBA1C 5.2 04/29/2021     Urinalysis: check q1yr, check q6mo if taking tenofovir  Lab Results   Component Value Date    PROTEINUA Negative 04/29/2024    UROBILINOGEN 1+ (A) 04/29/2024    KETONESU Negative 12/15/2021    NITRITE Negative 04/29/2024    LEUKOCYTESUR Negative 04/29/2024    COLORU Yellow 04/29/2024    RBCUA 0-5 04/29/2024     Glucose-6 Phosphate Dehydrogenase: check once  No results found for: "U5DZZZQ"  Hepatitis A IgG: check once  Lab Results   Component Value Date    HEPAIGG Reactive (A) 05/09/2023     Hepatitis B Surface Antibody: check once  Lab Results   Component Value Date    HEPBSAB Reactive (A) 05/09/2023    HBSABQUANT 146.23 05/09/2023     Hepatitis C Antibody: check q1yr if high risk, once if low " risk  Lab Results   Component Value Date    HEPCAB Reactive (A) 06/29/2022         Vaccines  Pneumovax-23: if CD4>200 give twice q5yrs apart before age 65, then once at 65, give >8wks from last Prevnar  Prevnar-13: if CD4>200 give once, give >1yr from last Pneumovax-23  Influenza vaccine: q1yr  HPV: Gardasil-9 at 0, 2, and 6 months for ages 15-45  Tdap: if age >35 give Td q10yrs, replace with Tdap once  MenACWY: 2 dose primary series 8 weeks apart, then 1 dose booster q5yr  Shingrix: All HIV above >17 y/o receive 2 dose series, timed 4 weeks to 6 months apart  Immunization History   Administered Date(s) Administered    COVID-19, MRNA, LN-S, PF (Pfizer) ([place]) 01/27/2022    COVID-19, vector-nr, rS-Ad26, PF ([employer]) 03/23/2021    Hepatitis A / Hepatitis B 06/29/2022, 01/09/2023    Hepatitis A, Adult 09/05/2012    Hepatitis B, Adult 10/19/2011    Influenza - Quadrivalent 10/20/2016, 02/16/2017, 11/12/2020, 12/15/2021    Influenza - Quadrivalent - PF (6-35 months) 11/09/2017, 10/23/2018    Influenza - Quadrivalent - PF *Preferred* (6 months and older) 02/22/2016, 11/09/2017, 12/15/2021, 01/09/2023    Influenza - Trivalent - PF (ADULT) 10/19/2011, 01/22/2014, 12/01/2014, 09/17/2024    Meningococcal Conjugate (MCV4O) 2 Vial (2mo-55yr) 05/02/2019, 11/12/2020    PPD Test 01/22/2014    Pneumococcal Conjugate - 13 Valent 12/01/2014    Pneumococcal Polysaccharide - 23 Valent 01/28/2013, 10/20/2016, 04/29/2021    Tdap 04/09/2015, 10/15/2018    Zoster Recombinant 12/15/2021, 05/09/2023       Imaging  DEXA Scan: if age>50, check q10yrs  Results for orders placed or performed during the hospital encounter of 10/04/23   DXA Bone Density Axial Skeleton 1 or more sites    Narrative    EXAMINATION:  DXA BONE DENSITY AXIAL SKELETON 1 OR MORE SITES    CLINICAL HISTORY:  Human immunodeficiency virus (HIV) disease    TECHNIQUE:  DXA scanning was performed over bilateral hips and the lumbar spine.  Review of the images confirms  "satisfactory positioning and technique.    COMPARISON:  None    FINDINGS:  LUMBAR SPINE    The L1 to L4 vertebral bone mineral density is equal to 1.043 g/cm squared with a T score of -1.2.    LEFT HIP    The left femoral neck bone mineral density is equal to 0.852 g/cm squared with a T score of -1.3.    The left total hip bone mineral density is equal to 0.911 g/cm squared with a T score of -0.8.    RIGHT HIP    The right femoral neck bone mineral density is equal to 0.813 g/cm squared with a T score of -1.6.    The right total hip bone mineral density is equal to 0.830 g/cm squared with a T score of -1.4.    FRAX 10-YEAR PROBABILITY OF FRACTURE    There is a 2.9% risk of a major osteoporotic fracture and a 0.8% risk of hip fracture in the next 10 years (FRAX).      Impression    Low bone mass.    Consider FDA approved medical therapies in postmenopausal women and men aged 50 years and older, based on the following:    *A hip or vertebral (clinical or morphometric) fracture  *T score less than or equal to -2.5 at the femoral neck or spine after appropriate evaluation to exclude secondary causes.  *Low bone mass -- also known as osteopenia (T score between -1.0 and -2.5 at the femoral neck or spine) and a 10 year probability of hip fracture greater than or equal to 3% or a 10 year probability of major osteoporosis-related fracture greater than or equal to 20% based on the [place]-adapted WHO algorithm.  *Clinicians judgment and/or patient preference may indicate treatment for people with 10 year fracture probabilities is above or below these levels.      Electronically signed by: Matt Hernandez  Date:    10/06/2023  Time:    17:01       Pathology:  Pap Smear: if female and age >21 check cervical pap q1yr, if male and MSM check rectal pap q1yr  No results found for: "PAP", performing today 9/17/2024    ASCVD Risk Score:  Consider high-intensity statin therapy if 10-year ASCVD risk score >7.5%  The 10-year ASCVD risk score " "(Maria Antonia MARTIN, et al., 2019) is: 9.8%    Values used to calculate the score:      Age: 54 years      Sex: Male      Is Non- : Yes      Diabetic: No      Tobacco smoker: Yes      Systolic Blood Pressure: 134 mmHg      Is BP treated: No      HDL Cholesterol: 82 mg/dL      Total Cholesterol: 177 mg/dL    Review of Systems   Constitutional:  Negative for chills, fever, malaise/fatigue and weight loss.   HENT:  Negative for congestion and sore throat.    Eyes:  Negative for blurred vision.   Respiratory:  Negative for cough, sputum production and shortness of breath.    Cardiovascular:  Negative for chest pain, palpitations and leg swelling.   Gastrointestinal:  Negative for abdominal pain, diarrhea, nausea and vomiting.   Genitourinary:  Negative for dysuria.   Musculoskeletal:  Negative for joint pain.   Skin:  Negative for rash.   Neurological:  Negative for focal weakness, weakness and headaches.       The following portions of the patient's history were reviewed and updated as appropriate: allergies, current medications, past family history, past medical history, past social history, past surgical history and problem list.    Objective:   Vitals:  /84 (BP Location: Left arm, Patient Position: Sitting, BP Method: Medium (Automatic))   Pulse 69   Temp 98 °F (36.7 °C) (Oral)   Resp 16   Ht 5' 6" (1.676 m)   Wt 83.4 kg (183 lb 15.6 oz)   BMI 29.69 kg/m²  Body mass index is 29.69 kg/m².    Gen: awake, alert, NAD  HEENT: NC/AT, EOMi, no anicteric sclera, no rhinorrhea, OP clear  Neck: no cervical LAD  CV: regular rate normal rhythm no murmur  Resp: easy WOB on RA CTABL no w/r/r  Abd: +BS, soft, NTTP, no HSM  Ext: warm, no LE edema  Skin: no rash  Neuro: no focal deficits       Assessment      1) HIV disease, most recent CD4 1027 (57%) and VL undetected  on 4/2024.  -Patient on Odefsey, endorses 100% compliance. He has no acute complaints on the medication.   2) Syphilis s/p treatment, most " recent RPR nonreactive  3) h/o HCV s/p treatment with Harvoni in 2018  4) Vit D def, osteopenia  5) HCM    Plan    - Order repeat labs today (CBC, CMP, Cd4, VL)  - Ordering Syphilis, UA  - Vit D ordered as well, takes vit d 2000 units daily  - Anal PAP: done today 9/17/2024  - Checking yearly quantiferon gold. Next due: 9/2024, ordered and pending  - Continue Odefsey. Refills given (6 months)  - Educated on importance of 100% medication compliance  - Educated on need for barrier contraception   - Reviewed vaccinations today. Due for flu shot, Fluarix given today  - Injectable HIV medication Cabenuva discussed with patient. Pt would like to continue Odefsey at this time, will continue to consider cabenuva. Patient counseled on risk/benefit of injectables for HIV management.     RTC 4 months    Vince Flores MD  [facility] IM, PGY-2

## 2024-09-18 LAB
CD3 CELLS # BLD: 1546 CELLS/MCL (ref 550–2202)
CD3 CELLS NFR BLD: 87 % (ref 58–86)
CD3+CD4+ CELLS # BLD: 956 CELLS/MCL (ref 365–1437)
CD3+CD4+ CELLS NFR BLD: 54 % (ref 32–64)
CD3+CD4+ CELLS/CD3+CD8+ CLL BLD: 1.6 %
CD3+CD8+ CELLS # BLD: 589 CELLS/MCL (ref 117–846)
CD3+CD8+ CELLS NFR BLD: 33 % (ref 11–40)
CD45 CELLS # BLD: 1.77 THOU/MCL (ref 0.82–2.84)
HIV1 RNA # PLAS NAA DL=20: NORMAL COPIES/ML

## 2024-09-19 LAB
GAMMA INTERFERON BACKGROUND BLD IA-ACNC: 0.06 IU/ML
M TB IFN-G BLD-IMP: NEGATIVE
M TB IFN-G CD4+ BCKGRND COR BLD-ACNC: 0.02 IU/ML
M TB IFN-G CD4+CD8+ BCKGRND COR BLD-ACNC: 0.04 IU/ML
MITOGEN IGNF BCKGRD COR BLD-ACNC: 9.94 IU/ML
T PALLIDUM AB SER QL AGGL: POSITIVE

## 2024-10-08 ENCOUNTER — CLINICAL SUPPORT (OUTPATIENT)
Dept: INFECTIOUS DISEASES | Facility: CLINIC | Age: 54
End: 2024-10-08
Payer: MEDICARE

## 2024-10-08 DIAGNOSIS — Z00.00 HEALTH CARE MAINTENANCE: ICD-10-CM

## 2024-10-08 DIAGNOSIS — B20 HIV DISEASE: ICD-10-CM

## 2024-12-30 ENCOUNTER — TELEPHONE (OUTPATIENT)
Dept: INFECTIOUS DISEASES | Facility: CLINIC | Age: 54
End: 2024-12-30

## 2024-12-30 ENCOUNTER — OFFICE VISIT (OUTPATIENT)
Dept: INFECTIOUS DISEASES | Facility: CLINIC | Age: 54
End: 2024-12-30
Payer: MEDICARE

## 2024-12-30 DIAGNOSIS — Z12.9 CANCER SCREENING: ICD-10-CM

## 2024-12-30 DIAGNOSIS — Z79.899 OTHER LONG TERM (CURRENT) DRUG THERAPY: ICD-10-CM

## 2024-12-30 DIAGNOSIS — M85.80 OSTEOPENIA, UNSPECIFIED LOCATION: ICD-10-CM

## 2024-12-30 DIAGNOSIS — E55.9 VITAMIN D DEFICIENCY: ICD-10-CM

## 2024-12-30 DIAGNOSIS — Z86.19 HEPATITIS C VIRUS INFECTION CURED AFTER ANTIVIRAL DRUG THERAPY: ICD-10-CM

## 2024-12-30 DIAGNOSIS — Z00.00 HEALTH CARE MAINTENANCE: ICD-10-CM

## 2024-12-30 DIAGNOSIS — B20 HIV DISEASE: Primary | ICD-10-CM

## 2024-12-30 DIAGNOSIS — Z72.0 TOBACCO USE: ICD-10-CM

## 2024-12-30 DIAGNOSIS — Z86.19 HISTORY OF SYPHILIS: ICD-10-CM

## 2024-12-30 PROCEDURE — 99214 OFFICE O/P EST MOD 30 MIN: CPT | Mod: 95,,, | Performed by: NURSE PRACTITIONER

## 2024-12-30 PROCEDURE — 1160F RVW MEDS BY RX/DR IN RCRD: CPT | Mod: CPTII,95,, | Performed by: NURSE PRACTITIONER

## 2024-12-30 PROCEDURE — 1159F MED LIST DOCD IN RCRD: CPT | Mod: CPTII,95,, | Performed by: NURSE PRACTITIONER

## 2024-12-30 RX ORDER — ACETAMINOPHEN 500 MG
2000 TABLET ORAL DAILY
Qty: 30 CAPSULE | Refills: 3 | Status: SHIPPED | OUTPATIENT
Start: 2024-12-30

## 2024-12-30 RX ORDER — EMTRICITABINE, RILPIVIRINE HYDROCHLORIDE, AND TENOFOVIR ALAFENAMIDE 200; 25; 25 MG/1; MG/1; MG/1
1 TABLET ORAL DAILY
Qty: 30 TABLET | Refills: 3 | Status: SHIPPED | OUTPATIENT
Start: 2024-12-30

## 2024-12-30 NOTE — PROGRESS NOTES
Patient ID: Barry Hilton 54 y.o.     Chief Complaint:   Chief Complaint   Patient presents with    HIV Positive/AIDS        HPI:    The patient location is: [place]  The chief complaint leading to consultation is: HIV disease   Visit type: audiovisual  Face to Face time with patient: 15 minutes   33 minutes of total time spent on the encounter, which includes face to face time and non-face to face time preparing to see the patient (eg, review of tests), Obtaining and/or reviewing separately obtained history, Documenting clinical information in the electronic or other health record, Independently interpreting results (not separately reported) and communicating results to the patient/family/caregiver, or Care coordination (not separately reported).   Each patient to whom he or she provides medical services by telemedicine is:  (1) informed of the relationship between the physician and patient and the respective role of any other health care provider with respect to management of the patient; and (2) notified that he or she may decline to receive medical services by telemedicine and may withdraw from such care at any time.    Notes: Barry is a 54 yr old BM who presents for routine HIV follow up with history of hep C co-infection (treated / cured).  He remains very well controlled on Odefsey with 100% compliance and well tolerance.  Last CD4 956 (54%) with viral suppression.  Patient reports he is sexually active with current partner.  Reports sex is always protected.  Discordant relationship.  Declines need for STI screening at this time.  Patient has a history of syphilis; last RPR nonreactive.  Last anal Pap with an adequate specimen.  We will plan to repeat upon next visit.  Patient with no current complaints except decreased appetite.  He reports he is going to start increasing the protein in his diet.  Last vitamin-D level 33.  Healthcare maintenance - patient is due for screening hemoglobin A1c and colon  cancer screening to which he agrees to both.  Smokes 1 pack per day.      9/17/2024 (per Dr ROEL Elena / Dr DENICE Baum)  Mr. Barry Hilton with PMH HIV disease (last Cd4 1027(57%), VL undetetable) who presents to ID clinic today for follow up visit. The patient is currently taking Odefsey and endorses 100% compliance. No difficulty getting medications. Reports right hand pain which is chronic secondary to carpal tunnel.  No acute complaints. He is still not sexually active. His last DEXA scan showed osteopenia, he has been taking supplement vitamin D and calcium since October of 2023. He denies fevers, chills, abdominal pain, shortness of breath, chest pain, nausea, vomiting, diarrhea, or headache.          HIV history:  Diagnosed 1991 - CD4 and VL at diagnosis unknown  Risk factor - MSM  History OI - N/A  History STIs - HCV, syphilis, chlamydia  Prior ART:              - Atripla until 2017              - Odefsey since 2017-present  Genotype testing unknown              -NRTI resistance mutations: none              -NNRTI resistance mutations: none              -PI resistance mutations: none  HLA- testing negative  G6PD testing unknown           Past Medical History:   Diagnosis Date    Depression     Human immunodeficiency virus (HIV) disease     Unspecified viral hepatitis C without hepatic coma     Vitamin D deficiency         Past Surgical History:   Procedure Laterality Date    EXCISION, CYST, PARATUBAL, LAPAROSCOPIC          Social History     Socioeconomic History    Marital status: Single   Tobacco Use    Smoking status: Every Day     Current packs/day: 0.50     Types: Cigarettes    Smokeless tobacco: Never   Substance and Sexual Activity    Alcohol use: Yes     Alcohol/week: 5.0 standard drinks of alcohol     Types: 5 Shots of liquor per week     Comment: social    Drug use: Never    Sexual activity: Not Currently        Family History   Problem Relation Name Age of Onset    Heart failure Mother       Stroke Father      Cancer Sister      Lupus Sister          Review of patient's allergies indicates:  No Known Allergies     Immunization History   Administered Date(s) Administered    COVID-19, MRNA, LN-S, PF (Pfizer) (Gray Cap) 01/27/2022    COVID-19, vector-nr, rS-Ad26, PF (SecureAuth) 03/23/2021    Hepatitis A / Hepatitis B 06/29/2022, 01/09/2023    Hepatitis A, Adult 09/05/2012    Hepatitis B, Adult 10/19/2011    Influenza - Quadrivalent 10/20/2016, 02/16/2017, 11/12/2020, 12/15/2021    Influenza - Quadrivalent - PF (6-35 months) 11/09/2017, 10/23/2018    Influenza - Quadrivalent - PF *Preferred* (6 months and older) 02/22/2016, 11/09/2017, 12/15/2021, 01/09/2023    Influenza - Trivalent - Fluarix, Flulaval, Fluzone, Afluria - PF 10/19/2011, 01/22/2014, 12/01/2014, 09/17/2024    Meningococcal Conjugate (MCV4O) 2 Vial (2mo-55yr) 05/02/2019, 11/12/2020    PPD Test 01/22/2014    Pneumococcal Conjugate - 13 Valent 12/01/2014    Pneumococcal Polysaccharide - 23 Valent 01/28/2013, 10/20/2016, 04/29/2021    Tdap 04/09/2015, 10/15/2018    Zoster Recombinant 12/15/2021, 05/09/2023        Review of Systems   Constitutional:  Negative for chills, fever and malaise/fatigue.   HENT:  Negative for congestion, ear pain, hearing loss and sore throat.    Eyes:  Negative for blurred vision, photophobia and pain.   Respiratory:  Negative for cough, hemoptysis, sputum production and shortness of breath.    Cardiovascular:  Negative for chest pain, palpitations and leg swelling.   Gastrointestinal:  Negative for abdominal pain, constipation, diarrhea, nausea and vomiting.   Genitourinary:  Negative for dysuria, flank pain, frequency, hematuria and urgency.   Musculoskeletal:  Negative for back pain, joint pain, myalgias and neck pain.   Skin:  Negative for itching and rash.   Neurological:  Negative for dizziness, weakness and headaches.   Endo/Heme/Allergies:  Does not bruise/bleed easily.   Psychiatric/Behavioral:  Negative for  depression and hallucinations.           Objective:      There were no vitals taken for this visit.     Physical Exam  Constitutional:       General: He is not in acute distress.     Appearance: Normal appearance. He is normal weight. He is not ill-appearing, toxic-appearing or diaphoretic.      Comments: Assessment limited d/t virtual video visit   HENT:      Head: Normocephalic and atraumatic.      Nose: Nose normal.   Eyes:      General: No scleral icterus.     Conjunctiva/sclera: Conjunctivae normal.      Pupils: Pupils are equal, round, and reactive to light.   Pulmonary:      Effort: Pulmonary effort is normal. No respiratory distress.      Breath sounds: No wheezing.   Abdominal:      General: Abdomen is flat. There is no distension.      Palpations: Abdomen is soft.      Tenderness: There is no guarding.   Musculoskeletal:         General: No swelling, deformity or signs of injury. Normal range of motion.      Cervical back: Normal range of motion and neck supple. No rigidity.   Skin:     General: Skin is dry.      Coloration: Skin is not jaundiced or pale.      Findings: No bruising, erythema, lesion or rash.   Neurological:      General: No focal deficit present.      Mental Status: He is alert and oriented to person, place, and time. Mental status is at baseline.   Psychiatric:         Mood and Affect: Mood normal.         Behavior: Behavior normal.         Thought Content: Thought content normal.         Judgment: Judgment normal.          Labs:   Lab Results   Component Value Date    WBC 4.08 (L) 09/17/2024    HGB 14.6 09/17/2024    HCT 41.9 (L) 09/17/2024    .2 (H) 09/17/2024     09/17/2024       CMP  Sodium   Date Value Ref Range Status   09/17/2024 138 136 - 145 mmol/L Final     Potassium   Date Value Ref Range Status   09/17/2024 4.2 3.5 - 5.1 mmol/L Final     Chloride   Date Value Ref Range Status   09/17/2024 107 98 - 107 mmol/L Final     CO2   Date Value Ref Range Status    09/17/2024 23 22 - 29 mmol/L Final     Blood Urea Nitrogen   Date Value Ref Range Status   09/17/2024 14.6 8.4 - 25.7 mg/dL Final     Creatinine   Date Value Ref Range Status   09/17/2024 0.81 0.73 - 1.18 mg/dL Final     Calcium   Date Value Ref Range Status   09/17/2024 9.3 8.4 - 10.2 mg/dL Final     Albumin   Date Value Ref Range Status   09/17/2024 3.8 3.5 - 5.0 g/dL Final     Bilirubin Total   Date Value Ref Range Status   09/17/2024 0.4 <=1.5 mg/dL Final     ALP   Date Value Ref Range Status   09/17/2024 63 40 - 150 unit/L Final     AST   Date Value Ref Range Status   09/17/2024 17 5 - 34 unit/L Final     ALT   Date Value Ref Range Status   09/17/2024 13 0 - 55 unit/L Final     eGFR   Date Value Ref Range Status   09/17/2024 >60 mL/min/1.73/m2 Final     Lab Results   Component Value Date    TSH 1.5529 12/15/2021     Hep C Ab Interp   Date Value Ref Range Status   06/29/2022 Reactive (A) Nonreactive Final     Syphilis Antibody   Date Value Ref Range Status   09/17/2024 Reactive (A) Nonreactive, Equivocal Final     RPR   Date Value Ref Range Status   09/17/2024 Non-Reactive Non-Reactive Final     RPR Titer   Date Value Ref Range Status   09/12/2023 0 dils (A) (none) Final     Cholesterol Total   Date Value Ref Range Status   12/15/2021 177 <<=200 mg/dL Final     HDL Cholesterol   Date Value Ref Range Status   12/15/2021 82 (H) 35 - 60 mg/dL Final     Triglyceride   Date Value Ref Range Status   12/15/2021 77 34 - 140 mg/dL Final     Cholesterol/HDL Ratio   Date Value Ref Range Status   12/15/2021 2 0 - 5 Final     Very Low Density Lipoprotein   Date Value Ref Range Status   12/15/2021 15  Final     LDL Cholesterol   Date Value Ref Range Status   12/15/2021 80.00 50.00 - 140.00 mg/dL Final     Vitamin D   Date Value Ref Range Status   09/17/2024 33 30 - 80 ng/mL Final     Results for orders placed or performed in visit on 12/12/23   CD4 Lymphocytes   Result Value Ref Range    Patient Age 53     WBC Absolute  4,480 (L) 4,500 - 11,500 /mm3    Lymph Percent 43.1 28 - 48 %    Lymph Absolute 1,930.88 1,260 - 5,520 x10(3)/mcL    CD4 % 58.3 %    CD4 Absolute 1,126 589 - 1,505 unit/L    T Cell Interp       Normal absolute lymphocyte count with normal absolute CD4+ lymphocyte count.    Atilio Eng M.D.       Narrative    This test was developed and its performance characteristics determined by [facility]. It has not been cleared or approved by the US Food and Drug Administration. The FDA does not require this test to go through premarket FDA review. This test is used for clinical purposes. It should not be regarded as investigational or for research. This laboratory is certified under the Clinical Laboratory Improvement Amendments (CLIA) as qualified to perform high complexity clinical laboratory testing.     Results for orders placed or performed in visit on 09/17/24   HIV-1 RNA, Quantitative, PCR with Reflex to Genotype   Result Value Ref Range    HIV-1 RNA Detect/Quant, P Undetected Undetected copies/mL     Results for orders placed or performed in visit on 09/17/24   Quantiferon Gold TB   Result Value Ref Range    QuantiFERON-Tb Gold Plus Result Negative Negative    TB1 Ag minus Nil Result 0.02 IU/mL    TB2 Ag minus Nil Result 0.04 IU/mL    Mitogen minus Nil Result 9.94 IU/mL    Nil Result 0.06 IU/mL     Results for orders placed or performed in visit on 05/09/23   C.trach/N.gonor AMP RNA    Specimen: Throat   Result Value Ref Range    Chlamydia trachomatis amplified RNA Negative Negative    Neisseria gonorrhoeae amplified RNA Negative Negative    Source ORAL/THROAT     SOURCE: ORAL/THROAT      Results for orders placed or performed in visit on 04/29/24   Urinalysis   Result Value Ref Range    Color, UA Yellow Yellow, Light-Yellow, Dark Yellow, Sosa, Straw    Appearance, UA Clear Clear    Specific Gravity, UA 1.019 1.005 - 1.030    pH, UA 6.0 5.0 - 8.5    Protein, UA Negative Negative     Glucose, UA Normal Negative, Normal    Ketones, UA Negative Negative    Blood, UA Trace (A) Negative    Bilirubin, UA Negative Negative    Urobilinogen, UA 1+ (A) 0.2, 1.0, Normal    Nitrites, UA Negative Negative    Leukocyte Esterase, UA Negative Negative    WBC, UA 0-5 None Seen, 0-2, 3-5, 0-5 /HPF    Bacteria, UA None Seen None Seen /HPF    Squamous Epithelial Cells, UA None Seen None Seen /HPF    Mucous, UA Trace (A) None Seen /LPF    Hyaline Casts, UA None Seen None Seen /lpf    RBC, UA 0-5 None Seen, 0-2, 3-5, 0-5 /HPF       Imaging: Reviewed most recent relevant imaging studies available, notable results highlighted in this note    Medications:     Current Outpatient Medications   Medication Instructions    cetirizine (ZYRTEC) 10 mg, Oral, Nightly    cholecalciferol (vitamin D3) (VITAMIN D3) 2,000 Units, Oral, Daily    diclofenac sodium (VOLTAREN) 4 g, 4 times daily PRN     mg, Every 6 hours PRN    ODEFSEY 200-25-25 mg Tab 1 tablet, Oral, Daily       Assessment:       Problem List Items Addressed This Visit          ID    HIV disease - Primary    Relevant Medications    ODEFSEY 200-25-25 mg Tab    cholecalciferol, vitamin D3, (VITAMIN D3) 50 mcg (2,000 unit) Cap capsule    Other Relevant Orders    CBC Auto Differential    CD4 Lymphocytes    Comprehensive Metabolic Panel    HIV-1 RNA, Quantitative, PCR with Reflex to Genotype    Glucose-6-Phosphate Dehydrogenase    History of syphilis    Relevant Orders    SYPHILIS ANTIBODY (WITH REFLEX RPR)       Endocrine    Vitamin D deficiency    Relevant Medications    cholecalciferol, vitamin D3, (VITAMIN D3) 50 mcg (2,000 unit) Cap capsule       GI    Hepatitis C virus infection cured after antiviral drug therapy    Relevant Orders    Hepatitis C Virus Quantitative       Orthopedic    Osteopenia    Relevant Medications    cholecalciferol, vitamin D3, (VITAMIN D3) 50 mcg (2,000 unit) Cap capsule       Other    Tobacco use     Other Visit Diagnoses       Other  long term (current) drug therapy        Relevant Orders    Hemoglobin A1C    Health care maintenance        Relevant Orders    Hemoglobin A1C    Cologuard Screening (Multitarget Stool DNA)    Cancer screening        Relevant Orders    Cologuard Screening (Multitarget Stool DNA)               Plan:      HIV disease  -     ODEFSEY 200-25-25 mg Tab; Take 1 tablet by mouth once daily.  Dispense: 30 tablet; Refill: 3  -     cholecalciferol, vitamin D3, (VITAMIN D3) 50 mcg (2,000 unit) Cap capsule; Take 1 capsule (2,000 Units total) by mouth once daily.  Dispense: 30 capsule; Refill: 3  -     CBC Auto Differential; Future; Expected date: 12/30/2024  -     CD4 Lymphocytes; Future; Expected date: 12/30/2024  -     Comprehensive Metabolic Panel; Future; Expected date: 12/30/2024  -     HIV-1 RNA, Quantitative, PCR with Reflex to Genotype; Future; Expected date: 12/30/2024  -     Glucose-6-Phosphate Dehydrogenase; Future; Expected date: 12/30/2024  Last CD4 956 (54%) with viral suppression  Continue Odefsey 1 tab po daily  Discussed HIV status at length to include need for 100% medication compliance and adherence, along with safe sex counseling performed.  Patient voiced understanding to both.  Will check labs today to include CD4, viral load, CBC and CM, along with G6PD as this is unknown.  Discussed importance of scheduled follow up as well.   RTC in 3 months with Khadijah FARAH    Other long term (current) drug therapy  -     Hemoglobin A1C; Future; Expected date: 12/30/2024  On ART    Hepatitis C virus infection cured after antiviral drug therapy  -     Hepatitis C Virus Quantitative; Future; Expected date: 12/30/2024  Last viral load undetectable  Repeat lab     History of syphilis  -     SYPHILIS ANTIBODY (WITH REFLEX RPR); Future; Expected date: 12/30/2024  Last RPR nonreactive   Repeat lab    Vitamin D deficiency  -     cholecalciferol, vitamin D3, (VITAMIN D3) 50 mcg (2,000 unit) Cap capsule; Take 1 capsule (2,000 Units  total) by mouth once daily.  Dispense: 30 capsule; Refill: 3    Osteopenia, unspecified location  -     cholecalciferol, vitamin D3, (VITAMIN D3) 50 mcg (2,000 unit) Cap capsule; Take 1 capsule (2,000 Units total) by mouth once daily.  Dispense: 30 capsule; Refill: 3  Last level 33   Continue cholecalciferol    Health care maintenance  -     Hemoglobin A1C; Future; Expected date: 12/30/2024  -     Cologuard Screening (Multitarget Stool DNA); Future; Expected date: 12/30/2024  Due for screening hemoglobin A1c   Agrees to Cologuard screening    Cancer screening  -     Cologuard Screening (Multitarget Stool DNA); Future; Expected date: 12/30/2024  Pt denies history of colon CA (self / family), denies history of adenomas, denies history of IBS, denies blood in stool, etc  Pt agrees to cologuard screening    Tobacco use   Smoking cessation encouraged          MI Valenzuela  [facility] Infectious Diseases

## 2024-12-30 NOTE — TELEPHONE ENCOUNTER
Khadijah can you place an order for the cologuard test, we do not have these at [facility], Payment plugin will mail directly to the pt.

## 2024-12-30 NOTE — TELEPHONE ENCOUNTER
Patient needs cologuard testing.  He will come to clinic for kit when he comes to do labs.  Thank you

## 2025-01-08 ENCOUNTER — LAB VISIT (OUTPATIENT)
Dept: LAB | Facility: HOSPITAL | Age: 55
End: 2025-01-08
Attending: NURSE PRACTITIONER
Payer: MEDICARE

## 2025-01-08 ENCOUNTER — TELEPHONE (OUTPATIENT)
Dept: INFECTIOUS DISEASES | Facility: CLINIC | Age: 55
End: 2025-01-08
Payer: MEDICARE

## 2025-01-08 DIAGNOSIS — Z79.899 OTHER LONG TERM (CURRENT) DRUG THERAPY: ICD-10-CM

## 2025-01-08 DIAGNOSIS — Z86.19 HEPATITIS C VIRUS INFECTION CURED AFTER ANTIVIRAL DRUG THERAPY: ICD-10-CM

## 2025-01-08 DIAGNOSIS — Z00.00 HEALTH CARE MAINTENANCE: ICD-10-CM

## 2025-01-08 DIAGNOSIS — Z86.19 HISTORY OF SYPHILIS: ICD-10-CM

## 2025-01-08 DIAGNOSIS — B20 HIV DISEASE: ICD-10-CM

## 2025-01-08 LAB
ALBUMIN SERPL-MCNC: 4 G/DL (ref 3.5–5)
ALBUMIN/GLOB SERPL: 0.9 RATIO (ref 1.1–2)
ALP SERPL-CCNC: 65 UNIT/L (ref 40–150)
ALT SERPL-CCNC: 13 UNIT/L (ref 0–55)
ANION GAP SERPL CALC-SCNC: 5 MEQ/L
AST SERPL-CCNC: 20 UNIT/L (ref 5–34)
BASOPHILS # BLD AUTO: 0.01 X10(3)/MCL
BASOPHILS NFR BLD AUTO: 0.2 %
BILIRUB SERPL-MCNC: 0.8 MG/DL
BUN SERPL-MCNC: 10.2 MG/DL (ref 8.4–25.7)
CALCIUM SERPL-MCNC: 9.6 MG/DL (ref 8.4–10.2)
CHLORIDE SERPL-SCNC: 105 MMOL/L (ref 98–107)
CO2 SERPL-SCNC: 27 MMOL/L (ref 22–29)
CREAT SERPL-MCNC: 1.01 MG/DL (ref 0.72–1.25)
CREAT/UREA NIT SERPL: 10
EOSINOPHIL # BLD AUTO: 0.03 X10(3)/MCL (ref 0–0.9)
EOSINOPHIL NFR BLD AUTO: 0.7 %
ERYTHROCYTE [DISTWIDTH] IN BLOOD BY AUTOMATED COUNT: 12.1 % (ref 11.5–17)
EST. AVERAGE GLUCOSE BLD GHB EST-MCNC: 102.5 MG/DL
GFR SERPLBLD CREATININE-BSD FMLA CKD-EPI: >60 ML/MIN/1.73/M2
GLOBULIN SER-MCNC: 4.4 GM/DL (ref 2.4–3.5)
GLUCOSE SERPL-MCNC: 91 MG/DL (ref 74–100)
HBA1C MFR BLD: 5.2 %
HCT VFR BLD AUTO: 42.3 % (ref 42–52)
HGB BLD-MCNC: 14.7 G/DL (ref 14–18)
IMM GRANULOCYTES # BLD AUTO: 0.01 X10(3)/MCL (ref 0–0.04)
IMM GRANULOCYTES NFR BLD AUTO: 0.2 %
LYMPHOCYTES # BLD AUTO: 1.36 X10(3)/MCL (ref 0.6–4.6)
LYMPHOCYTES NFR BLD AUTO: 33.6 %
MCH RBC QN AUTO: 35.7 PG (ref 27–31)
MCHC RBC AUTO-ENTMCNC: 34.8 G/DL (ref 33–36)
MCV RBC AUTO: 102.7 FL (ref 80–94)
MONOCYTES # BLD AUTO: 0.45 X10(3)/MCL (ref 0.1–1.3)
MONOCYTES NFR BLD AUTO: 11.1 %
NEUTROPHILS # BLD AUTO: 2.19 X10(3)/MCL (ref 2.1–9.2)
NEUTROPHILS NFR BLD AUTO: 54.2 %
NRBC BLD AUTO-RTO: 0 %
PLATELET # BLD AUTO: 238 X10(3)/MCL (ref 130–400)
PMV BLD AUTO: 9.9 FL (ref 7.4–10.4)
POTASSIUM SERPL-SCNC: 4 MMOL/L (ref 3.5–5.1)
PROT SERPL-MCNC: 8.4 GM/DL (ref 6.4–8.3)
RBC # BLD AUTO: 4.12 X10(6)/MCL (ref 4.7–6.1)
SODIUM SERPL-SCNC: 137 MMOL/L (ref 136–145)
T PALLIDUM AB SER QL: REACTIVE
WBC # BLD AUTO: 4.05 X10(3)/MCL (ref 4.5–11.5)

## 2025-01-08 PROCEDURE — 86780 TREPONEMA PALLIDUM: CPT

## 2025-01-08 PROCEDURE — 87536 HIV-1 QUANT&REVRSE TRNSCRPJ: CPT

## 2025-01-08 PROCEDURE — 86592 SYPHILIS TEST NON-TREP QUAL: CPT

## 2025-01-08 PROCEDURE — 86361 T CELL ABSOLUTE COUNT: CPT

## 2025-01-08 PROCEDURE — 83036 HEMOGLOBIN GLYCOSYLATED A1C: CPT

## 2025-01-08 PROCEDURE — 85025 COMPLETE CBC W/AUTO DIFF WBC: CPT

## 2025-01-08 PROCEDURE — 36415 COLL VENOUS BLD VENIPUNCTURE: CPT

## 2025-01-08 PROCEDURE — 82955 ASSAY OF G6PD ENZYME: CPT

## 2025-01-08 PROCEDURE — 87522 HEPATITIS C REVRS TRNSCRPJ: CPT

## 2025-01-08 PROCEDURE — 80053 COMPREHEN METABOLIC PANEL: CPT

## 2025-01-08 NOTE — TELEPHONE ENCOUNTER
----- Message from Sherice sent at 1/8/2025  2:23 PM CST -----  Patient of Khadijah STRONG    Patient needs order for stool test.  Ordered on 12/30/24.    [phone]  2:26  Thanks,

## 2025-01-09 LAB
AGE: 54
CD3+CD4+ CELLS # SPEC: 760 UNIT/L (ref 589–1505)
CD3+CD4+ CELLS NFR BLD: 55.2 %
HCV RNA SERPL NAA+PROBE-ACNC: NORMAL IU/ML
LYMPHOCYTES # BLD AUTO: 1377 X10(3)/MCL (ref 1260–5520)
LYMPHOCYTES NFR LN MANUAL: 34 % (ref 28–48)
LYMPHOMA - T-CELL MARKERS SPEC-IMP: ABNORMAL
RPR SER QL: NORMAL
WBC # BLD AUTO: 4050 /MM3 (ref 4500–11500)

## 2025-01-10 LAB — G6PD RBC-CCNT: 8 U/G HB (ref 8–11.9)

## 2025-01-10 NOTE — TELEPHONE ENCOUNTER
Called [facility] at [phone]. The lab did receive the order however the DX was incorrect.  Spoke to Latrice Melo NP Dx is Colon Cancer Screening. ICD10 Z12.11    They will mail pt his package today.

## 2025-01-10 NOTE — TELEPHONE ENCOUNTER
Khadijah the lab did receive the order however Dx was incorrect.  Needs to be colon cancer screening, not cancer screening or health care maintenance.

## 2025-01-11 LAB — HIV1 RNA # PLAS NAA DL=20: NORMAL COPIES/ML

## 2025-01-15 LAB — T PALLIDUM AB SER QL AGGL: POSITIVE

## 2025-04-02 ENCOUNTER — OFFICE VISIT (OUTPATIENT)
Dept: INFECTIOUS DISEASES | Facility: CLINIC | Age: 55
End: 2025-04-02
Payer: MEDICARE

## 2025-04-02 ENCOUNTER — LAB VISIT (OUTPATIENT)
Dept: LAB | Facility: HOSPITAL | Age: 55
End: 2025-04-02
Attending: NURSE PRACTITIONER
Payer: MEDICARE

## 2025-04-02 VITALS
HEIGHT: 66 IN | DIASTOLIC BLOOD PRESSURE: 80 MMHG | BODY MASS INDEX: 26.26 KG/M2 | RESPIRATION RATE: 16 BRPM | SYSTOLIC BLOOD PRESSURE: 117 MMHG | HEART RATE: 107 BPM | WEIGHT: 163.38 LBS | TEMPERATURE: 98 F

## 2025-04-02 DIAGNOSIS — B20 HIV DISEASE: ICD-10-CM

## 2025-04-02 DIAGNOSIS — Z72.0 TOBACCO USE: ICD-10-CM

## 2025-04-02 DIAGNOSIS — R63.4 WEIGHT LOSS: ICD-10-CM

## 2025-04-02 DIAGNOSIS — M85.80 OSTEOPENIA, UNSPECIFIED LOCATION: ICD-10-CM

## 2025-04-02 DIAGNOSIS — F10.90 ALCOHOL USE: ICD-10-CM

## 2025-04-02 DIAGNOSIS — Z00.00 HEALTH CARE MAINTENANCE: ICD-10-CM

## 2025-04-02 DIAGNOSIS — Z86.19 HEPATITIS C VIRUS INFECTION CURED AFTER ANTIVIRAL DRUG THERAPY: ICD-10-CM

## 2025-04-02 DIAGNOSIS — Z86.19 HISTORY OF SYPHILIS: ICD-10-CM

## 2025-04-02 DIAGNOSIS — E55.9 VITAMIN D DEFICIENCY: ICD-10-CM

## 2025-04-02 DIAGNOSIS — Z11.3 ROUTINE SCREENING FOR STI (SEXUALLY TRANSMITTED INFECTION): ICD-10-CM

## 2025-04-02 DIAGNOSIS — B20 HIV DISEASE: Primary | ICD-10-CM

## 2025-04-02 DIAGNOSIS — Z12.9 CANCER SCREENING: ICD-10-CM

## 2025-04-02 LAB
25(OH)D3+25(OH)D2 SERPL-MCNC: 32 NG/ML (ref 30–80)
ALBUMIN SERPL-MCNC: 4.3 G/DL (ref 3.5–5)
ALBUMIN/GLOB SERPL: 1 RATIO (ref 1.1–2)
ALP SERPL-CCNC: 56 UNIT/L (ref 40–150)
ALT SERPL-CCNC: 27 UNIT/L (ref 0–55)
ANION GAP SERPL CALC-SCNC: 8 MEQ/L
AST SERPL-CCNC: 30 UNIT/L (ref 11–45)
BASOPHILS # BLD AUTO: 0.02 X10(3)/MCL
BASOPHILS NFR BLD AUTO: 0.4 %
BILIRUB SERPL-MCNC: 0.3 MG/DL
BUN SERPL-MCNC: 10.4 MG/DL (ref 8.4–25.7)
C TRACH DNA SPEC QL NAA+PROBE: NOT DETECTED
C TRACH DNA SPEC QL NAA+PROBE: NOT DETECTED
CALCIUM SERPL-MCNC: 9 MG/DL (ref 8.4–10.2)
CHLORIDE SERPL-SCNC: 112 MMOL/L (ref 98–107)
CO2 SERPL-SCNC: 22 MMOL/L (ref 22–29)
CREAT SERPL-MCNC: 0.7 MG/DL (ref 0.72–1.25)
CREAT/UREA NIT SERPL: 15
EOSINOPHIL # BLD AUTO: 0.03 X10(3)/MCL (ref 0–0.9)
EOSINOPHIL NFR BLD AUTO: 0.6 %
ERYTHROCYTE [DISTWIDTH] IN BLOOD BY AUTOMATED COUNT: 12.2 % (ref 11.5–17)
GFR SERPLBLD CREATININE-BSD FMLA CKD-EPI: >60 ML/MIN/1.73/M2
GLOBULIN SER-MCNC: 4.2 GM/DL (ref 2.4–3.5)
GLUCOSE SERPL-MCNC: 103 MG/DL (ref 74–100)
HBV SURFACE AB SER-ACNC: 103.37 MIU/ML
HBV SURFACE AB SERPL IA-ACNC: REACTIVE M[IU]/ML
HBV SURFACE AG SERPL QL IA: NONREACTIVE
HCT VFR BLD AUTO: 42.2 % (ref 42–52)
HCV AB SERPL QL IA: REACTIVE
HGB BLD-MCNC: 15.1 G/DL (ref 14–18)
IMM GRANULOCYTES # BLD AUTO: 0 X10(3)/MCL (ref 0–0.04)
IMM GRANULOCYTES NFR BLD AUTO: 0 %
LYMPHOCYTES # BLD AUTO: 2.28 X10(3)/MCL (ref 0.6–4.6)
LYMPHOCYTES NFR BLD AUTO: 48 %
MCH RBC QN AUTO: 36.7 PG (ref 27–31)
MCHC RBC AUTO-ENTMCNC: 35.8 G/DL (ref 33–36)
MCV RBC AUTO: 102.4 FL (ref 80–94)
MONOCYTES # BLD AUTO: 0.42 X10(3)/MCL (ref 0.1–1.3)
MONOCYTES NFR BLD AUTO: 8.8 %
N GONORRHOEA DNA SPEC QL NAA+PROBE: NOT DETECTED
N GONORRHOEA DNA SPEC QL NAA+PROBE: NOT DETECTED
NEUTROPHILS # BLD AUTO: 2 X10(3)/MCL (ref 2.1–9.2)
NEUTROPHILS NFR BLD AUTO: 42.2 %
NRBC BLD AUTO-RTO: 0 %
PLATELET # BLD AUTO: 253 X10(3)/MCL (ref 130–400)
PMV BLD AUTO: 9.9 FL (ref 7.4–10.4)
POTASSIUM SERPL-SCNC: 3.8 MMOL/L (ref 3.5–5.1)
PROT SERPL-MCNC: 8.5 GM/DL (ref 6.4–8.3)
RBC # BLD AUTO: 4.12 X10(6)/MCL (ref 4.7–6.1)
SODIUM SERPL-SCNC: 142 MMOL/L (ref 136–145)
SOURCE (OHS): NORMAL
SOURCE (OHS): NORMAL
T PALLIDUM AB SER QL: REACTIVE
WBC # BLD AUTO: 4.75 X10(3)/MCL (ref 4.5–11.5)

## 2025-04-02 PROCEDURE — 86780 TREPONEMA PALLIDUM: CPT

## 2025-04-02 PROCEDURE — 87591 N.GONORRHOEAE DNA AMP PROB: CPT | Performed by: NURSE PRACTITIONER

## 2025-04-02 PROCEDURE — 3044F HG A1C LEVEL LT 7.0%: CPT | Mod: CPTII,,, | Performed by: NURSE PRACTITIONER

## 2025-04-02 PROCEDURE — 1159F MED LIST DOCD IN RCRD: CPT | Mod: CPTII,,, | Performed by: NURSE PRACTITIONER

## 2025-04-02 PROCEDURE — 86706 HEP B SURFACE ANTIBODY: CPT

## 2025-04-02 PROCEDURE — 36415 COLL VENOUS BLD VENIPUNCTURE: CPT

## 2025-04-02 PROCEDURE — 99214 OFFICE O/P EST MOD 30 MIN: CPT | Mod: S$PBB,,, | Performed by: NURSE PRACTITIONER

## 2025-04-02 PROCEDURE — 3074F SYST BP LT 130 MM HG: CPT | Mod: CPTII,,, | Performed by: NURSE PRACTITIONER

## 2025-04-02 PROCEDURE — 85025 COMPLETE CBC W/AUTO DIFF WBC: CPT

## 2025-04-02 PROCEDURE — 86780 TREPONEMA PALLIDUM: CPT | Mod: 59

## 2025-04-02 PROCEDURE — 3079F DIAST BP 80-89 MM HG: CPT | Mod: CPTII,,, | Performed by: NURSE PRACTITIONER

## 2025-04-02 PROCEDURE — 82306 VITAMIN D 25 HYDROXY: CPT

## 2025-04-02 PROCEDURE — 1160F RVW MEDS BY RX/DR IN RCRD: CPT | Mod: CPTII,,, | Performed by: NURSE PRACTITIONER

## 2025-04-02 PROCEDURE — 87536 HIV-1 QUANT&REVRSE TRNSCRPJ: CPT

## 2025-04-02 PROCEDURE — 86803 HEPATITIS C AB TEST: CPT

## 2025-04-02 PROCEDURE — 87340 HEPATITIS B SURFACE AG IA: CPT

## 2025-04-02 PROCEDURE — 86592 SYPHILIS TEST NON-TREP QUAL: CPT

## 2025-04-02 PROCEDURE — 3008F BODY MASS INDEX DOCD: CPT | Mod: CPTII,,, | Performed by: NURSE PRACTITIONER

## 2025-04-02 PROCEDURE — 87522 HEPATITIS C REVRS TRNSCRPJ: CPT

## 2025-04-02 PROCEDURE — 80053 COMPREHEN METABOLIC PANEL: CPT

## 2025-04-02 PROCEDURE — 99213 OFFICE O/P EST LOW 20 MIN: CPT | Mod: PBBFAC | Performed by: NURSE PRACTITIONER

## 2025-04-02 PROCEDURE — 86361 T CELL ABSOLUTE COUNT: CPT

## 2025-04-02 PROCEDURE — 87491 CHLMYD TRACH DNA AMP PROBE: CPT | Performed by: NURSE PRACTITIONER

## 2025-04-02 RX ORDER — EMTRICITABINE, RILPIVIRINE HYDROCHLORIDE, AND TENOFOVIR ALAFENAMIDE 200; 25; 25 MG/1; MG/1; MG/1
1 TABLET ORAL DAILY
Qty: 30 TABLET | Refills: 3 | Status: SHIPPED | OUTPATIENT
Start: 2025-04-02

## 2025-04-02 RX ORDER — ACETAMINOPHEN 500 MG
2000 TABLET ORAL DAILY
Qty: 30 CAPSULE | Refills: 3 | Status: SHIPPED | OUTPATIENT
Start: 2025-04-02

## 2025-04-02 RX ORDER — CYPROHEPTADINE HYDROCHLORIDE 4 MG/1
4 TABLET ORAL 3 TIMES DAILY PRN
Qty: 90 TABLET | Refills: 2 | Status: SHIPPED | OUTPATIENT
Start: 2025-04-02

## 2025-04-02 NOTE — PROGRESS NOTES
Patient ID: Barry Hilton 54 y.o.     Chief Complaint:   Chief Complaint   Patient presents with    Follow-up     B20        HPI:    Barry is a 54 yr old MSM BM who presents for routine HIV visit with history of hep C co-infection (treated / cured).  He smells strongly of alcohol.  He is well controlled on Odefsey to which he reports no missed doses and well tolerance to medication.  Last CD4 760 (55.2%) with viral suppression.  Last hep C viral load undetectable.  He reports he has been in an on and off relationship with a straight man who is on the DL, but the partner is incarcerated today.  Reports just oral sexual encounters.  Patient has a history of syphilis; last RPR nonreactive.  Due for STI screening today to which he agrees.  Last anal Pap with insufficiency sample; patient wants to defer repeat Pap until next visit.  No current complaints, except 20# weight loss since September 2024.  He reports he has not been eating because he is so stressed out with his relationship.  He is requesting appetite stimulant.  Patient is supposed to be on a daily vitamin-D supplement, but he reports he has not taken it in over a month.  Last vitamin-D level 33.  Last DEXA scan 09/12/2023 showed concerns for osteopenia.  Long discussion had with patient on importance of vitamin-D therapy.  Patient states he has the kit to the [employer], he just has not done it yet.  Smokes 1 pack per day.  Admits to drinking 2 pt of whiskey daily.  He states he will decline to answer about current drug use.    12/30/2024  The patient location is: [place]  The chief complaint leading to consultation is: HIV disease   Visit type: audiovisual  Face to Face time with patient: 15 minutes   33 minutes of total time spent on the encounter, which includes face to face time and non-face to face time preparing to see the patient (eg, review of tests), Obtaining and/or reviewing separately obtained history, Documenting clinical information in the  electronic or other health record, Independently interpreting results (not separately reported) and communicating results to the patient/family/caregiver, or Care coordination (not separately reported).   Each patient to whom he or she provides medical services by telemedicine is:  (1) informed of the relationship between the physician and patient and the respective role of any other health care provider with respect to management of the patient; and (2) notified that he or she may decline to receive medical services by telemedicine and may withdraw from such care at any time.  Notes: Barry is a 54 yr old BM who presents for routine HIV follow up with history of hep C co-infection (treated / cured).  He remains very well controlled on Odefsey with 100% compliance and well tolerance.  Last CD4 956 (54%) with viral suppression.  Patient reports he is sexually active with current partner.  Reports sex is always protected.  Discordant relationship.  Declines need for STI screening at this time.  Patient has a history of syphilis; last RPR nonreactive.  Last anal Pap with an adequate specimen.  We will plan to repeat upon next visit.  Patient with no current complaints except decreased appetite.  He reports he is going to start increasing the protein in his diet.  Last vitamin-D level 33.  Healthcare maintenance - patient is due for screening hemoglobin A1c and colon cancer screening to which he agrees to both.  Smokes 1 pack per day.     9/17/2024 (per Dr ROEL Elena / Dr DENICE Baum)  Mr. Barry Hilton with PMH HIV disease (last Cd4 1027(57%), VL undetetable) who presents to ID clinic today for follow up visit. The patient is currently taking Odefsey and endorses 100% compliance. No difficulty getting medications. Reports right hand pain which is chronic secondary to carpal tunnel.  No acute complaints. He is still not sexually active. His last DEXA scan showed osteopenia, he has been taking supplement vitamin D and  calcium since October of 2023. He denies fevers, chills, abdominal pain, shortness of breath, chest pain, nausea, vomiting, diarrhea, or headache.          HIV history:  Diagnosed 1991 - CD4 and VL at diagnosis unknown  Risk factor - MSM  History OI - N/A  History STIs - HCV, syphilis, chlamydia  Prior ART:              - Atripla until 2017              - Odefsey since 2017-present  Genotype testing unknown              -NRTI resistance mutations: none              -NNRTI resistance mutations: none              -PI resistance mutations: none  HLA- testing negative  G6PD testing negative            Past Medical History:   Diagnosis Date    Depression     Human immunodeficiency virus (HIV) disease     Unspecified viral hepatitis C without hepatic coma     Vitamin D deficiency         Past Surgical History:   Procedure Laterality Date    EXCISION, CYST, PARATUBAL, LAPAROSCOPIC          Social History     Socioeconomic History    Marital status: Single   Tobacco Use    Smoking status: Every Day     Current packs/day: 1.00     Average packs/day: 1 pack/day for 10.0 years (10.0 ttl pk-yrs)     Types: Cigarettes    Smokeless tobacco: Never   Substance and Sexual Activity    Alcohol use: Yes     Alcohol/week: 5.0 standard drinks of alcohol     Types: 5 Shots of liquor per week     Comment: social    Drug use: Never    Sexual activity: Not Currently        Family History   Problem Relation Name Age of Onset    Heart failure Mother      Stroke Father Venus Vences     Cancer Sister Keerthi Hilton     Lupus Sister Keerthi Hilton         Review of patient's allergies indicates:  No Known Allergies     Immunization History   Administered Date(s) Administered    COVID-19, MRNA, LN-S, PF (Pfizer) (Gray Cap) 01/27/2022    COVID-19, vector-nr, rS-Ad26, PF ([employer]) 03/23/2021    Hepatitis A / Hepatitis B 06/29/2022, 01/09/2023    Hepatitis A, Adult 09/05/2012    Hepatitis B, Adult 10/19/2011    Influenza - Quadrivalent  "10/20/2016, 02/16/2017, 11/12/2020, 12/15/2021    Influenza - Quadrivalent - PF (6-35 months) 11/09/2017, 10/23/2018    Influenza - Quadrivalent - PF *Preferred* (6 months and older) 02/22/2016, 11/09/2017, 12/15/2021, 01/09/2023    Influenza - Trivalent - Fluarix, Flulaval, Fluzone, Afluria - PF 10/19/2011, 01/22/2014, 12/01/2014, 09/17/2024    Meningococcal Conjugate (MCV4O) 2 Vial (2mo-55yr) 05/02/2019, 11/12/2020    PPD Test 01/22/2014    Pneumococcal Conjugate - 13 Valent 12/01/2014    Pneumococcal Polysaccharide - 23 Valent 01/28/2013, 10/20/2016, 04/29/2021    Tdap 04/09/2015, 10/15/2018    Zoster Recombinant 12/15/2021, 05/09/2023        Review of Systems   Constitutional:  Positive for weight loss (20 pounds since September 2024). Negative for chills, fever and malaise/fatigue.   HENT:  Negative for congestion, ear pain, hearing loss and sore throat.    Eyes:  Negative for blurred vision, photophobia and pain.   Respiratory:  Negative for cough, hemoptysis, sputum production and shortness of breath.    Cardiovascular:  Negative for chest pain, palpitations and leg swelling.   Gastrointestinal:  Negative for abdominal pain, constipation, diarrhea, nausea and vomiting.   Genitourinary:  Negative for dysuria, flank pain, frequency, hematuria and urgency.   Musculoskeletal:  Negative for back pain, joint pain, myalgias and neck pain.   Skin:  Negative for itching and rash.   Neurological:  Negative for dizziness, seizures, weakness and headaches.   Endo/Heme/Allergies:  Does not bruise/bleed easily.   Psychiatric/Behavioral:  Negative for depression and hallucinations.           Objective:      /80   Pulse 107   Temp 98 °F (36.7 °C) (Oral)   Resp 16   Ht 5' 6" (1.676 m)   Wt 74.1 kg (163 lb 5.8 oz)   BMI 26.37 kg/m²      Physical Exam  Vitals reviewed.   Constitutional:       General: He is awake. He is not in acute distress.     Appearance: Normal appearance. He is normal weight. He is not " ill-appearing, toxic-appearing or diaphoretic.      Comments: Smells of alcohol   HENT:      Head: Normocephalic and atraumatic.      Nose: Nose normal.      Mouth/Throat:      Mouth: Mucous membranes are moist.      Pharynx: No oropharyngeal exudate or posterior oropharyngeal erythema.      Comments: No thrush  Eyes:      General: No scleral icterus.     Conjunctiva/sclera: Conjunctivae normal.      Pupils: Pupils are equal, round, and reactive to light.   Neck:      Comments: No JVD noted  Cardiovascular:      Rate and Rhythm: Normal rate and regular rhythm.      Heart sounds: Normal heart sounds. No murmur heard.     No friction rub. No gallop.   Pulmonary:      Effort: Pulmonary effort is normal. No respiratory distress.      Breath sounds: Normal breath sounds. No wheezing.   Abdominal:      General: Bowel sounds are normal. There is no distension.      Palpations: Abdomen is soft.      Tenderness: There is no abdominal tenderness. There is no guarding.   Musculoskeletal:         General: No swelling or deformity. Normal range of motion.      Cervical back: Normal range of motion and neck supple.      Right lower leg: No edema.      Left lower leg: No edema.   Skin:     General: Skin is warm and dry.      Capillary Refill: Capillary refill takes less than 2 seconds.      Coloration: Skin is not jaundiced.      Findings: No rash.   Neurological:      General: No focal deficit present.      Mental Status: He is alert and oriented to person, place, and time. Mental status is at baseline.   Psychiatric:         Mood and Affect: Mood is elated.         Behavior: Behavior normal.         Thought Content: Thought content normal.         Judgment: Judgment normal.          Labs:   Lab Results   Component Value Date    WBC 4.75 04/02/2025    HGB 15.1 04/02/2025    HCT 42.2 04/02/2025    .4 (H) 04/02/2025     04/02/2025       CMP  Sodium   Date Value Ref Range Status   01/08/2025 137 136 - 145 mmol/L Final      Potassium   Date Value Ref Range Status   01/08/2025 4.0 3.5 - 5.1 mmol/L Final     Chloride   Date Value Ref Range Status   01/08/2025 105 98 - 107 mmol/L Final     CO2   Date Value Ref Range Status   01/08/2025 27 22 - 29 mmol/L Final     Blood Urea Nitrogen   Date Value Ref Range Status   01/08/2025 10.2 8.4 - 25.7 mg/dL Final     Creatinine   Date Value Ref Range Status   01/08/2025 1.01 0.72 - 1.25 mg/dL Final     Calcium   Date Value Ref Range Status   01/08/2025 9.6 8.4 - 10.2 mg/dL Final     Albumin   Date Value Ref Range Status   01/08/2025 4.0 3.5 - 5.0 g/dL Final     Bilirubin Total   Date Value Ref Range Status   01/08/2025 0.8 <=1.5 mg/dL Final     ALP   Date Value Ref Range Status   01/08/2025 65 40 - 150 unit/L Final     AST   Date Value Ref Range Status   01/08/2025 20 5 - 34 unit/L Final     ALT   Date Value Ref Range Status   01/08/2025 13 0 - 55 unit/L Final     eGFR   Date Value Ref Range Status   01/08/2025 >60 mL/min/1.73/m2 Final     Lab Results   Component Value Date    TSH 1.5529 12/15/2021     Hep C Ab Interp   Date Value Ref Range Status   06/29/2022 Reactive (A) Nonreactive Final     Syphilis Antibody   Date Value Ref Range Status   01/08/2025 Reactive (A) Nonreactive, Equivocal Final     RPR   Date Value Ref Range Status   01/08/2025 Non-Reactive Non-Reactive Final     RPR Titer   Date Value Ref Range Status   09/12/2023 0 dils (A) (none) Final     Cholesterol Total   Date Value Ref Range Status   12/15/2021 177 <<=200 mg/dL Final     HDL Cholesterol   Date Value Ref Range Status   12/15/2021 82 (H) 35 - 60 mg/dL Final     Triglyceride   Date Value Ref Range Status   12/15/2021 77 34 - 140 mg/dL Final     Cholesterol/HDL Ratio   Date Value Ref Range Status   12/15/2021 2 0 - 5 Final     Very Low Density Lipoprotein   Date Value Ref Range Status   12/15/2021 15  Final     LDL Cholesterol   Date Value Ref Range Status   12/15/2021 80.00 50.00 - 140.00 mg/dL Final     Vitamin D   Date  Value Ref Range Status   09/17/2024 33 30 - 80 ng/mL Final     Results for orders placed or performed in visit on 01/08/25   CD4 Lymphocytes   Result Value Ref Range    Patient Age 54     WBC Absolute 4,050 (L) 4,500 - 11,500 /mm3    Lymph Percent 34 28 - 48 %    Lymph Absolute 1,377 1,260 - 5,520 x10(3)/mcL    CD4 % 55.2 %    CD4 Absolute 760 589 - 1,505 unit/L    T Cell Interp       Normal absolute lymphocyte count with normal absolute CD4+ lymphocyte count.    Atilio Eng M.D.     Narrative    This test was developed and its performance characteristics determined by [facility]. It has not been cleared or approved by the US Food and Drug Administration. The FDA does not require this test to go through premarket FDA review. This test is used for clinical purposes. It should not be regarded as investigational or for research. This laboratory is certified under the Clinical Laboratory Improvement Amendments (CLIA) as qualified to perform high complexity clinical laboratory testing.     Results for orders placed or performed in visit on 01/08/25   HIV-1 RNA, Quantitative, PCR with Reflex to Genotype   Result Value Ref Range    HIV-1 RNA Detect/Quant, P Undetected Undetected copies/mL     Results for orders placed or performed in visit on 09/17/24   Quantiferon Gold TB   Result Value Ref Range    QuantiFERON-Tb Gold Plus Result Negative Negative    TB1 Ag minus Nil Result 0.02 IU/mL    TB2 Ag minus Nil Result 0.04 IU/mL    Mitogen minus Nil Result 9.94 IU/mL    Nil Result 0.06 IU/mL     Results for orders placed or performed in visit on 05/09/23   C.trach/N.gonor AMP RNA    Specimen: Throat   Result Value Ref Range    Chlamydia trachomatis amplified RNA Negative Negative    Neisseria gonorrhoeae amplified RNA Negative Negative    Source ORAL/THROAT     SOURCE: ORAL/THROAT      Results for orders placed or performed in visit on 04/29/24   Urinalysis   Result Value Ref Range    Color,  UA Yellow Yellow, Light-Yellow, Dark Yellow, Sosa, Straw    Appearance, UA Clear Clear    Specific Gravity, UA 1.019 1.005 - 1.030    pH, UA 6.0 5.0 - 8.5    Protein, UA Negative Negative    Glucose, UA Normal Negative, Normal    Ketones, UA Negative Negative    Blood, UA Trace (A) Negative    Bilirubin, UA Negative Negative    Urobilinogen, UA 1+ (A) 0.2, 1.0, Normal    Nitrites, UA Negative Negative    Leukocyte Esterase, UA Negative Negative    WBC, UA 0-5 None Seen, 0-2, 3-5, 0-5 /HPF    Bacteria, UA None Seen None Seen /HPF    Squamous Epithelial Cells, UA None Seen None Seen /HPF    Mucous, UA Trace (A) None Seen /LPF    Hyaline Casts, UA None Seen None Seen /lpf    RBC, UA 0-5 None Seen, 0-2, 3-5, 0-5 /HPF       Imaging: Reviewed most recent relevant imaging studies available, notable results highlighted in this note    Medications:     Current Outpatient Medications   Medication Instructions    cetirizine (ZYRTEC) 10 mg, Oral, Nightly    cholecalciferol (vitamin D3) (VITAMIN D3) 2,000 Units, Oral, Daily    cyproheptadine (PERIACTIN) 4 mg, Oral, 3 times daily PRN     mg, Every 6 hours PRN    ODEFSEY 200-25-25 mg Tab 1 tablet, Oral, Daily       Assessment:       Problem List Items Addressed This Visit          Psychiatric    Alcohol use       ID    HIV disease - Primary    Relevant Medications    ODEFSEY 200-25-25 mg Tab    cholecalciferol, vitamin D3, (VITAMIN D3) 50 mcg (2,000 unit) Cap capsule    Other Relevant Orders    CBC Auto Differential (Completed)    CD4 Lymphocytes    Comprehensive Metabolic Panel    HIV-1 RNA, Quantitative, PCR with Reflex to Genotype    Hepatitis B Surface Antigen    Hepatitis B Surface Ab, Qualitative    History of syphilis    Relevant Orders    SYPHILIS ANTIBODY (WITH REFLEX RPR)       Endocrine    Vitamin D deficiency    Relevant Medications    cholecalciferol, vitamin D3, (VITAMIN D3) 50 mcg (2,000 unit) Cap capsule    Other Relevant Orders    Vitamin D       GI     Hepatitis C virus infection cured after antiviral drug therapy    Relevant Orders    Hepatitis C Virus Quantitative    Hepatitis C Antibody       Orthopedic    Osteopenia    Relevant Medications    cholecalciferol, vitamin D3, (VITAMIN D3) 50 mcg (2,000 unit) Cap capsule    Other Relevant Orders    Vitamin D       Other    Tobacco use     Other Visit Diagnoses         Routine screening for STI (sexually transmitted infection)        Relevant Orders    Chlamydia/GC, PCR    C.trach/N.gonor AMP RNA    Chlamydia/GC, PCR      Weight loss        Relevant Medications    cyproheptadine (PERIACTIN) 4 mg tablet      Cancer screening          Health care maintenance                   Plan:      HIV disease  -     ODEFSEY 200-25-25 mg Tab; Take 1 tablet by mouth once daily.  Dispense: 30 tablet; Refill: 3  -     cholecalciferol, vitamin D3, (VITAMIN D3) 50 mcg (2,000 unit) Cap capsule; Take 1 capsule (2,000 Units total) by mouth once daily.  Dispense: 30 capsule; Refill: 3  -     CBC Auto Differential; Future; Expected date: 04/02/2025  -     CD4 Lymphocytes; Future; Expected date: 04/02/2025  -     Comprehensive Metabolic Panel; Future; Expected date: 04/02/2025  -     HIV-1 RNA, Quantitative, PCR with Reflex to Genotype; Future; Expected date: 04/02/2025  -     Hepatitis B Surface Antigen; Future; Expected date: 04/02/2025  -     Hepatitis B Surface Ab, Qualitative; Future; Expected date: 04/02/2025  Well-controlled   Last CD4 760 (55.2%) with viral suppression  Continue Odefsey 1 tab p.o. daily   Discussed HIV status at length to include need for 100% medication compliance and adherence, along with safe sex counseling performed.  Patient voiced understanding to both.  Will check labs today to include CD4, viral load, CBC and CMP.  Discussed importance of scheduled follow up as well.   RTC in 3 months with Khadijah FARAH     Hepatitis C virus infection cured after antiviral drug therapy  -     Hepatitis C Virus Quantitative;  Future; Expected date: 04/02/2025  -     Hepatitis C Antibody; Future; Expected date: 04/02/2025  Last viral load undetectable   Repeat viral load today    History of syphilis  -     SYPHILIS ANTIBODY (WITH REFLEX RPR); Future; Expected date: 04/02/2025  Last RPR nonreactive   Repeat labs today    Routine screening for STI (sexually transmitted infection)  -     Chlamydia/GC, PCR  -     C.trach/N.gonor AMP RNA  -     Chlamydia/GC, PCR  Due for GC/CT screening (urine / oral / rectal), along with screening for syphilis and hepatitis  Stressed sexual precautions    Vitamin D deficiency  -     cholecalciferol, vitamin D3, (VITAMIN D3) 50 mcg (2,000 unit) Cap capsule; Take 1 capsule (2,000 Units total) by mouth once daily.  Dispense: 30 capsule; Refill: 3  -     Vitamin D; Future; Expected date: 04/02/2025  Last vitamin-D level 33   Patient admits to being off vitamin-D supplement for over 1 month.  Restart cholecalciferol   Repeat labs today    Osteopenia, unspecified location  -     cholecalciferol, vitamin D3, (VITAMIN D3) 50 mcg (2,000 unit) Cap capsule; Take 1 capsule (2,000 Units total) by mouth once daily.  Dispense: 30 capsule; Refill: 3  -     Vitamin D; Future; Expected date: 04/02/2025  Last DEXA 09/12/2023 with concerns for osteopenia   Patient to continue vitamin-D supplement   Repeat DEXA 09/2025    Weight loss  -     cyproheptadine (PERIACTIN) 4 mg tablet; Take 1 tablet (4 mg total) by mouth 3 (three) times daily as needed (for decreased appetite).  Dispense: 90 tablet; Refill: 2  Weight loss due to stress and likely alcohol  Stress to increase food intake and follow a healthy diet   Alcohol cessation encouraged   Prescription cyproheptadine given    Cancer screening  Last anal Pap with insufficient sample  Patient deferring repeat anal Pap until next visit     Health care maintenance  Advised to do Cologuard testing ASAP    Alcohol use   Alcohol cessation encouraged    Tobacco use  Tobacco cessation  encouraged          LIEN ValenzuelaP-C  [facility]     32 minutes of total time spent on the encounter, which includes face to face time and non-face to face time preparing to see the patient (eg, review of tests), Obtaining and/or reviewing separately obtained history, Documenting clinical information in the electronic or other health record, Independently interpreting results (not separately reported) and communicating results to the patient/family/caregiver, or Care coordination (not separately reported).

## 2025-04-03 LAB
AGE: 54
CD3+CD4+ CELLS # SPEC: 1418 UNIT/L (ref 589–1505)
CD3+CD4+ CELLS NFR BLD: 62.2 %
LYMPHOCYTES # BLD AUTO: 2280 X10(3)/MCL (ref 1260–5520)
LYMPHOCYTES NFR LN MANUAL: 48 % (ref 28–48)
LYMPHOMA - T-CELL MARKERS SPEC-IMP: NORMAL
WBC # BLD AUTO: 4750 /MM3 (ref 4500–11500)

## 2025-04-04 LAB
C TRACH RRNA SPEC QL NAA+PROBE: NEGATIVE
HCV RNA SERPL NAA+PROBE-ACNC: NORMAL IU/ML
HIV1 RNA # PLAS NAA DL=20: 27 COPIES/ML
N GONORRHOEA RRNA SPEC QL NAA+PROBE: NEGATIVE
RPR SER QL: NORMAL
SPECIMEN SOURCE: NORMAL
SPECIMEN SOURCE: NORMAL

## 2025-04-09 LAB — T PALLIDUM AB SER QL AGGL: POSITIVE

## 2025-07-30 ENCOUNTER — OFFICE VISIT (OUTPATIENT)
Dept: INFECTIOUS DISEASES | Facility: CLINIC | Age: 55
End: 2025-07-30
Payer: MEDICARE

## 2025-07-30 ENCOUNTER — PATIENT MESSAGE (OUTPATIENT)
Dept: INFECTIOUS DISEASES | Facility: CLINIC | Age: 55
End: 2025-07-30

## 2025-07-30 DIAGNOSIS — M79.641 RIGHT HAND PAIN: ICD-10-CM

## 2025-07-30 DIAGNOSIS — M85.80 OSTEOPENIA, UNSPECIFIED LOCATION: ICD-10-CM

## 2025-07-30 DIAGNOSIS — Z11.3 ENCOUNTER FOR SCREENING FOR INFECTIONS WITH A PREDOMINANTLY SEXUAL MODE OF TRANSMISSION: ICD-10-CM

## 2025-07-30 DIAGNOSIS — F10.90 ALCOHOL USE: ICD-10-CM

## 2025-07-30 DIAGNOSIS — Z86.19 HEPATITIS C VIRUS INFECTION CURED AFTER ANTIVIRAL DRUG THERAPY: ICD-10-CM

## 2025-07-30 DIAGNOSIS — E55.9 VITAMIN D DEFICIENCY: ICD-10-CM

## 2025-07-30 DIAGNOSIS — Z72.0 TOBACCO USE: ICD-10-CM

## 2025-07-30 DIAGNOSIS — Z23 NEED FOR VACCINATION: ICD-10-CM

## 2025-07-30 DIAGNOSIS — Z86.19 HISTORY OF SYPHILIS: ICD-10-CM

## 2025-07-30 DIAGNOSIS — B20 HIV DISEASE: Primary | ICD-10-CM

## 2025-07-30 RX ORDER — IBUPROFEN 800 MG/1
800 TABLET ORAL EVERY 6 HOURS PRN
Qty: 30 TABLET | Refills: 0 | Status: SHIPPED | OUTPATIENT
Start: 2025-07-30

## 2025-07-30 RX ORDER — ACETAMINOPHEN 500 MG
2000 TABLET ORAL DAILY
Qty: 30 CAPSULE | Refills: 4 | Status: SHIPPED | OUTPATIENT
Start: 2025-07-30

## 2025-07-30 RX ORDER — EMTRICITABINE, RILPIVIRINE HYDROCHLORIDE, AND TENOFOVIR ALAFENAMIDE 200; 25; 25 MG/1; MG/1; MG/1
1 TABLET ORAL DAILY
Qty: 30 TABLET | Refills: 4 | Status: SHIPPED | OUTPATIENT
Start: 2025-07-30

## 2025-07-30 NOTE — PROGRESS NOTES
Patient ID: Barry Hilton 54 y.o.     Chief Complaint:   Chief Complaint   Patient presents with    HIV Positive/AIDS        HPI:    Barry is a 54 yr old MSM BM who presents for routine HIV visit with history of hep C co-infection (treated / cured). He is well controlled on Odefsey to which he reports no missed doses and well tolerance to medication.  Last CD4 1418 (62.2%) with viral suppression.  Last hep C viral load undetectable.  Patient reports he is not currently sexually active.  He reports his previous partner is now out of longterm, but they are not together.  Patient has a history of syphilis; last RPR nonreactive.  He declines additional need at this time for STI screening.  Anal Pap deferred until next visit as this visit is virtual.  Patient with no current complaints except right hand pain.  He reports he was supposed to have surgery but this has been postponed at this point.  He is requesting prescription for ibuprofen.  We will give short term course of ibuprofen, but additional refills will be deferred to Orthopedics.  Patient reports he is no longer taking cyproheptadine, he is eating well on his own.  Last vitamin-D level 32.  Patient continues to take daily vitamin-D supplement.  Last DEXA scan with osteopenia.  Patient continues to smoke 1 pack per day and drink a 1/5th of mike on the weekends.  Due for Menveo booster at next clinic visit.     04/02/2025  Barry is a 54 yr old MSM BM who presents for routine HIV visit with history of hep C co-infection (treated / cured).  He smells strongly of alcohol.  He is well controlled on Odefsey to which he reports no missed doses and well tolerance to medication.  Last CD4 760 (55.2%) with viral suppression.  Last hep C viral load undetectable.  He reports he has been in an on and off relationship with a straight man who is on the DL, but the partner is incarcerated today.  Reports just oral sexual encounters.  Patient has a history of syphilis; last  RPR nonreactive.  Due for STI screening today to which he agrees.  Last anal Pap with insufficiency sample; patient wants to defer repeat Pap until next visit.  No current complaints, except 20# weight loss since September 2024.  He reports he has not been eating because he is so stressed out with his relationship.  He is requesting appetite stimulant.  Patient is supposed to be on a daily vitamin-D supplement, but he reports he has not taken it in over a month.  Last vitamin-D level 33.  Last DEXA scan 09/12/2023 showed concerns for osteopenia.  Long discussion had with patient on importance of vitamin-D therapy.  Patient states he has the kit to the Colog[facility], he just has not done it yet.  Smokes 1 pack per day.  Admits to drinking 2 pt of whiskey daily.  He states he will decline to answer about current drug use.     12/30/2024  The patient location is: [place]  The chief complaint leading to consultation is: HIV disease   Visit type: audiovisual  Face to Face time with patient: 15 minutes   33 minutes of total time spent on the encounter, which includes face to face time and non-face to face time preparing to see the patient (eg, review of tests), Obtaining and/or reviewing separately obtained history, Documenting clinical information in the electronic or other health record, Independently interpreting results (not separately reported) and communicating results to the patient/family/caregiver, or Care coordination (not separately reported).   Each patient to whom he or she provides medical services by telemedicine is:  (1) informed of the relationship between the physician and patient and the respective role of any other health care provider with respect to management of the patient; and (2) notified that he or she may decline to receive medical services by telemedicine and may withdraw from such care at any time.  Notes: Barry is a 54 yr old BM who presents for routine HIV follow up with history of hep C  co-infection (treated / cured).  He remains very well controlled on Odefsey with 100% compliance and well tolerance.  Last CD4 956 (54%) with viral suppression.  Patient reports he is sexually active with current partner.  Reports sex is always protected.  Discordant relationship.  Declines need for STI screening at this time.  Patient has a history of syphilis; last RPR nonreactive.  Last anal Pap with an adequate specimen.  We will plan to repeat upon next visit.  Patient with no current complaints except decreased appetite.  He reports he is going to start increasing the protein in his diet.  Last vitamin-D level 33.  Healthcare maintenance - patient is due for screening hemoglobin A1c and colon cancer screening to which he agrees to both.  Smokes 1 pack per day.     9/17/2024 (per Dr ROEL Elena / Dr DENICE Baum)  Mr. Barry Hilton with PMH HIV disease (last Cd4 1027(57%), VL undetetable) who presents to ID clinic today for follow up visit. The patient is currently taking Odefsey and endorses 100% compliance. No difficulty getting medications. Reports right hand pain which is chronic secondary to carpal tunnel.  No acute complaints. He is still not sexually active. His last DEXA scan showed osteopenia, he has been taking supplement vitamin D and calcium since October of 2023. He denies fevers, chills, abdominal pain, shortness of breath, chest pain, nausea, vomiting, diarrhea, or headache.          HIV history:  Diagnosed 1991 - CD4 and VL at diagnosis unknown  Risk factor - MSM  History OI - N/A  History STIs - HCV, syphilis, chlamydia  Prior ART:              - Atripla until 2017              - Odefsey since 2017-present  Genotype testing unknown              -NRTI resistance mutations: none              -NNRTI resistance mutations: none              -PI resistance mutations: none  HLA- NEGATIVE  G6PD NORMAL (8.3)           Past Medical History:   Diagnosis Date    Depression     Human immunodeficiency virus  (HIV) disease     Unspecified viral hepatitis C without hepatic coma     Vitamin D deficiency         Past Surgical History:   Procedure Laterality Date    EXCISION, CYST, PARATUBAL, LAPAROSCOPIC          Social History     Socioeconomic History    Marital status: Single   Tobacco Use    Smoking status: Every Day     Current packs/day: 1.00     Average packs/day: 1 pack/day for 10.0 years (10.0 ttl pk-yrs)     Types: Cigarettes    Smokeless tobacco: Never   Substance and Sexual Activity    Alcohol use: Yes     Alcohol/week: 5.0 standard drinks of alcohol     Types: 5 Shots of liquor per week     Comment: social    Drug use: Never    Sexual activity: Not Currently        Family History   Problem Relation Name Age of Onset    Heart failure Mother      Stroke Father Venus Vences     Cancer Sister Keerthi Hilton     Lupus Sister Keerthi Hilton         Review of patient's allergies indicates:  No Known Allergies     Immunization History   Administered Date(s) Administered    COVID-19, MRNA, LN-S, PF (Pfizer) (Gray Cap) 01/27/2022    COVID-19, vector-nr, rS-Ad26, PF (Dinos Rule) 03/23/2021    Hepatitis A / Hepatitis B 06/29/2022, 01/09/2023    Hepatitis A, Adult 09/05/2012    Hepatitis B, Adult 10/19/2011    Influenza - Quadrivalent 10/20/2016, 02/16/2017, 11/12/2020, 12/15/2021    Influenza - Quadrivalent - PF (6-35 months) 11/09/2017, 10/23/2018    Influenza - Quadrivalent - PF *Preferred* (6 months and older) 02/22/2016, 11/09/2017, 12/15/2021, 01/09/2023    Influenza - Trivalent - Fluarix, Flulaval, Fluzone, Afluria - PF 10/19/2011, 01/22/2014, 12/01/2014, 09/17/2024    Meningococcal Conjugate (MCV4O) 2 Vial (2mo-55yr) 05/02/2019, 11/12/2020    PPD Test 01/22/2014    Pneumococcal Conjugate - 13 Valent 12/01/2014    Pneumococcal Polysaccharide - 23 Valent 01/28/2013, 10/20/2016, 04/29/2021    Tdap 04/09/2015, 10/15/2018    Zoster Recombinant 12/15/2021, 05/09/2023        Review of Systems   Constitutional:  Negative for  chills, diaphoresis, fever and malaise/fatigue.   HENT:  Negative for congestion, ear pain, hearing loss and sore throat.    Eyes:  Negative for blurred vision, photophobia and pain.   Respiratory:  Negative for cough, hemoptysis, sputum production and shortness of breath.    Cardiovascular:  Negative for chest pain, palpitations and leg swelling.   Gastrointestinal:  Negative for abdominal pain, constipation, diarrhea, nausea and vomiting.   Genitourinary:  Negative for dysuria, flank pain, frequency, hematuria and urgency.   Musculoskeletal:  Negative for back pain, joint pain, myalgias and neck pain.        RT hand pain    Skin:  Negative for itching and rash.   Neurological:  Negative for dizziness, weakness and headaches.   Endo/Heme/Allergies:  Does not bruise/bleed easily.   Psychiatric/Behavioral:  Negative for depression and hallucinations.           Objective:      There were no vitals taken for this visit.     Physical Exam  Constitutional:       General: He is not in acute distress.     Appearance: Normal appearance. He is normal weight. He is not ill-appearing, toxic-appearing or diaphoretic.      Comments: Assessment limited d/t virtual video visit   HENT:      Head: Normocephalic and atraumatic.      Nose: Nose normal.      Mouth/Throat:      Mouth: Mucous membranes are moist.      Pharynx: Oropharynx is clear.   Eyes:      General: No scleral icterus.     Conjunctiva/sclera: Conjunctivae normal.      Pupils: Pupils are equal, round, and reactive to light.   Pulmonary:      Effort: Pulmonary effort is normal. No respiratory distress.      Breath sounds: No wheezing (no audible wheeze).   Abdominal:      General: Abdomen is flat. There is no distension.      Palpations: Abdomen is soft.      Tenderness: There is no guarding.   Musculoskeletal:         General: No swelling, deformity or signs of injury. Normal range of motion.      Cervical back: Normal range of motion and neck supple. No rigidity.    Skin:     General: Skin is dry.      Coloration: Skin is not jaundiced or pale.      Findings: No bruising, erythema, lesion or rash.   Neurological:      General: No focal deficit present.      Mental Status: He is alert and oriented to person, place, and time. Mental status is at baseline.   Psychiatric:         Mood and Affect: Mood normal.         Behavior: Behavior normal.         Thought Content: Thought content normal.         Judgment: Judgment normal.          Labs:   Lab Results   Component Value Date    WBC 4.75 04/02/2025    HGB 15.1 04/02/2025    HCT 42.2 04/02/2025    .4 (H) 04/02/2025     04/02/2025       CMP  Sodium   Date Value Ref Range Status   04/02/2025 142 136 - 145 mmol/L Final     Potassium   Date Value Ref Range Status   04/02/2025 3.8 3.5 - 5.1 mmol/L Final     Chloride   Date Value Ref Range Status   04/02/2025 112 (H) 98 - 107 mmol/L Final     CO2   Date Value Ref Range Status   04/02/2025 22 22 - 29 mmol/L Final     Glucose   Date Value Ref Range Status   04/02/2025 103 (H) 74 - 100 mg/dL Final     Blood Urea Nitrogen   Date Value Ref Range Status   04/02/2025 10.4 8.4 - 25.7 mg/dL Final     Creatinine   Date Value Ref Range Status   04/02/2025 0.70 (L) 0.72 - 1.25 mg/dL Final     Calcium   Date Value Ref Range Status   04/02/2025 9.0 8.4 - 10.2 mg/dL Final     Protein Total   Date Value Ref Range Status   04/02/2025 8.5 (H) 6.4 - 8.3 gm/dL Final     Albumin   Date Value Ref Range Status   04/02/2025 4.3 3.5 - 5.0 g/dL Final     Bilirubin Total   Date Value Ref Range Status   04/02/2025 0.3 <=1.5 mg/dL Final     ALP   Date Value Ref Range Status   04/02/2025 56 40 - 150 unit/L Final     AST   Date Value Ref Range Status   04/02/2025 30 11 - 45 unit/L Final     ALT   Date Value Ref Range Status   04/02/2025 27 0 - 55 unit/L Final     eGFR   Date Value Ref Range Status   04/02/2025 >60 mL/min/1.73/m2 Final     Comment:     Estimated GFR calculated using the CKD-EPI  creatinine (2021) equation.     Lab Results   Component Value Date    TSH 1.5529 12/15/2021     Hep C Ab Interp   Date Value Ref Range Status   04/02/2025 Reactive (A) Nonreactive Final     Syphilis Antibody   Date Value Ref Range Status   04/02/2025 Reactive (A) Nonreactive, Equivocal Final     RPR   Date Value Ref Range Status   04/02/2025 Non-Reactive Non-Reactive Final     RPR Titer   Date Value Ref Range Status   09/12/2023 0 dils (A) (none) Final     Cholesterol Total   Date Value Ref Range Status   12/15/2021 177 <<=200 mg/dL Final     HDL Cholesterol   Date Value Ref Range Status   12/15/2021 82 (H) 35 - 60 mg/dL Final     Triglyceride   Date Value Ref Range Status   12/15/2021 77 34 - 140 mg/dL Final     Cholesterol/HDL Ratio   Date Value Ref Range Status   12/15/2021 2 0 - 5 Final     Very Low Density Lipoprotein   Date Value Ref Range Status   12/15/2021 15  Final     Vitamin D   Date Value Ref Range Status   04/02/2025 32 30 - 80 ng/mL Final     Results for orders placed or performed in visit on 04/02/25   CD4 Lymphocytes   Result Value Ref Range    Patient Age 54     WBC Absolute 4,750 4,500 - 11,500 /mm3    Lymph Percent 48 28 - 48 %    Lymph Absolute 2,280 1,260 - 5,520 x10(3)/mcL    CD4 % 62.2 %    CD4 Absolute 1,418 589 - 1,505 unit/L    T Cell Interp       Normal absolute lymphocyte count with normal absolute CD4+ lymphocyte count.    Atilio Eng M.D.     Narrative    This test was developed and its performance characteristics determined by [facility]. It has not been cleared or approved by the US Food and Drug Administration. The FDA does not require this test to go through premarket FDA review. This test is used for clinical purposes. It should not be regarded as investigational or for research. This laboratory is certified under the Clinical Laboratory Improvement Amendments (CLIA) as qualified to perform high complexity clinical laboratory testing.      Results for orders placed or performed in visit on 04/02/25   HIV-1 RNA, Quantitative, PCR with Reflex to Genotype   Result Value Ref Range    HIV-1 RNA Detect/Quant, P 27 (A) Undetected copies/mL     Results for orders placed or performed in visit on 09/17/24   Quantiferon Gold TB   Result Value Ref Range    QuantiFERON-Tb Gold Plus Result Negative Negative    TB1 Ag minus Nil Result 0.02 IU/mL    TB2 Ag minus Nil Result 0.04 IU/mL    Mitogen minus Nil Result 9.94 IU/mL    Nil Result 0.06 IU/mL     Results for orders placed or performed in visit on 04/02/25   C.trach/N.gonor AMP RNA    Specimen: Throat   Result Value Ref Range    Source THROAT     Chlamydia trachomatis amplified RNA Negative Negative    Neisseria gonorrhoeae amplified RNA Negative Negative    SOURCE: THROAT      Results for orders placed or performed in visit on 04/29/24   Urinalysis   Result Value Ref Range    Color, UA Yellow Yellow, Light-Yellow, Dark Yellow, Sosa, Straw    Appearance, UA Clear Clear    Specific Gravity, UA 1.019 1.005 - 1.030    pH, UA 6.0 5.0 - 8.5    Protein, UA Negative Negative    Glucose, UA Normal Negative, Normal    Ketones, UA Negative Negative    Blood, UA Trace (A) Negative    Bilirubin, UA Negative Negative    Urobilinogen, UA 1+ (A) 0.2, 1.0, Normal    Nitrites, UA Negative Negative    Leukocyte Esterase, UA Negative Negative    WBC, UA 0-5 None Seen, 0-2, 3-5, 0-5 /HPF    Bacteria, UA None Seen None Seen /HPF    Squamous Epithelial Cells, UA None Seen None Seen /HPF    Mucous, UA Trace (A) None Seen /LPF    Hyaline Casts, UA None Seen None Seen /lpf    RBC, UA 0-5 None Seen, 0-2, 3-5, 0-5 /HPF       Imaging: Reviewed most recent relevant imaging studies available, notable results highlighted in this note    Medications:     Current Outpatient Medications   Medication Instructions    cetirizine (ZYRTEC) 10 mg, Oral, Nightly    cholecalciferol (vitamin D3) (VITAMIN D3) 2,000 Units, Oral, Daily     mg,  Oral, Every 6 hours PRN, Take with food    ODEFSEY 200-25-25 mg Tab 1 tablet, Oral, Daily       Assessment:       Problem List Items Addressed This Visit          Psychiatric    Alcohol use       ID    HIV disease - Primary    Relevant Medications    ODEFSEY 200-25-25 mg Tab    cholecalciferol, vitamin D3, (VITAMIN D3) 50 mcg (2,000 unit) Cap capsule    Other Relevant Orders    CBC Auto Differential    CD4 Lymphocytes    Comprehensive Metabolic Panel    HIV RNA, Quantitative, PCR    History of syphilis    Relevant Orders    SYPHILIS ANTIBODY (WITH REFLEX RPR)       Endocrine    Vitamin D deficiency    Relevant Medications    cholecalciferol, vitamin D3, (VITAMIN D3) 50 mcg (2,000 unit) Cap capsule       GI    Hepatitis C virus infection cured after antiviral drug therapy       Orthopedic    Osteopenia    Relevant Medications    cholecalciferol, vitamin D3, (VITAMIN D3) 50 mcg (2,000 unit) Cap capsule       Other    Tobacco use     Other Visit Diagnoses         Encounter for screening for infections with a predominantly sexual mode of transmission        Relevant Orders    SYPHILIS ANTIBODY (WITH REFLEX RPR)      Right hand pain        Relevant Medications     mg tablet      Need for vaccination                   Plan:      HIV disease  -     ODEFSEY 200-25-25 mg Tab; Take 1 tablet by mouth once daily.  Dispense: 30 tablet; Refill: 4  -     cholecalciferol, vitamin D3, (VITAMIN D3) 50 mcg (2,000 unit) Cap capsule; Take 1 capsule (2,000 Units total) by mouth once daily.  Dispense: 30 capsule; Refill: 4  -     CBC Auto Differential; Future; Expected date: 07/30/2025  -     CD4 Lymphocytes; Future; Expected date: 07/30/2025  -     Comprehensive Metabolic Panel; Future; Expected date: 07/30/2025  -     HIV RNA, Quantitative, PCR; Future; Expected date: 07/30/2025  Well-controlled   Last CD4 1418 (62.2%) with viral suppression  Continue Odefsey 1 tab p.o. daily   Discussed HIV status at length to include need for  100% medication compliance and adherence, along with safe sex counseling performed.  Patient voiced understanding to both.  Will check labs today to include CD4, viral load, CBC and CMP.  Discussed importance of scheduled follow up as well.   Patient will need anal Pap on next clinic visit  RTC in 4 months with Khadiajh FARAH     Hepatitis C virus infection cured after antiviral drug therapy   Last viral load undetectable    History of syphilis  -     SYPHILIS ANTIBODY (WITH REFLEX RPR); Future; Expected date: 07/30/2025  Last RPR nonreactive   Repeat labs today    Encounter for screening for infections with a predominantly sexual mode of transmission  -     SYPHILIS ANTIBODY (WITH REFLEX RPR); Future; Expected date: 07/30/2025    Vitamin D deficiency  -     cholecalciferol, vitamin D3, (VITAMIN D3) 50 mcg (2,000 unit) Cap capsule; Take 1 capsule (2,000 Units total) by mouth once daily.  Dispense: 30 capsule; Refill: 4  Last vitamin-D level 32   Continue cholecalciferol    Osteopenia, unspecified location  -     cholecalciferol, vitamin D3, (VITAMIN D3) 50 mcg (2,000 unit) Cap capsule; Take 1 capsule (2,000 Units total) by mouth once daily.  Dispense: 30 capsule; Refill: 4  Last DEXA 09/12/2023 with concerns for osteopenia   Patient to continue vitamin-D supplement   Repeat DEXA 09/2025    Right hand pain  -      mg tablet; Take 1 tablet (800 mg total) by mouth every 6 (six) hours as needed for Pain. Take with food  Dispense: 30 tablet; Refill: 0  Refill x 1 given.  Defer additional management to Ortho    Alcohol use   Alcohol cessation encouraged     Tobacco use   Tobacco cessation encouraged     Need for vaccination   Due for Menveo booster next clinic appointment         MI Valenzuela  [facility] Infectious Diseases     The patient location is: [place]  The chief complaint leading to consultation is: HIV disease   Visit type: audiovisual  Face to Face time with patient: 11 minutes  28 minutes of  total time spent on the encounter, which includes face to face time and non-face to face time preparing to see the patient (eg, review of tests), Obtaining and/or reviewing separately obtained history, Documenting clinical information in the electronic or other health record, Independently interpreting results (not separately reported) and communicating results to the patient/family/caregiver, or Care coordination (not separately reported).   Each patient to whom he or she provides medical services by telemedicine is:  (1) informed of the relationship between the physician and patient and the respective role of any other health care provider with respect to management of the patient; and (2) notified that he or she may decline to receive medical services by telemedicine and may withdraw from such care at any time.    Notes:

## 2025-08-20 ENCOUNTER — LAB VISIT (OUTPATIENT)
Dept: LAB | Facility: HOSPITAL | Age: 55
End: 2025-08-20
Attending: NURSE PRACTITIONER
Payer: MEDICARE

## 2025-08-20 DIAGNOSIS — Z11.3 ENCOUNTER FOR SCREENING FOR INFECTIONS WITH A PREDOMINANTLY SEXUAL MODE OF TRANSMISSION: ICD-10-CM

## 2025-08-20 DIAGNOSIS — B20 HIV DISEASE: ICD-10-CM

## 2025-08-20 DIAGNOSIS — Z86.19 HISTORY OF SYPHILIS: ICD-10-CM

## 2025-08-20 LAB
ALBUMIN SERPL-MCNC: 3.4 G/DL (ref 3.5–5)
ALBUMIN/GLOB SERPL: 0.8 RATIO (ref 1.1–2)
ALP SERPL-CCNC: 60 UNIT/L (ref 40–150)
ALT SERPL-CCNC: 22 UNIT/L (ref 0–55)
ANION GAP SERPL CALC-SCNC: 8 MEQ/L
AST SERPL-CCNC: 26 UNIT/L (ref 11–45)
BASOPHILS # BLD AUTO: 0.02 X10(3)/MCL
BASOPHILS NFR BLD AUTO: 0.5 %
BILIRUB SERPL-MCNC: 0.4 MG/DL
BUN SERPL-MCNC: 8.6 MG/DL (ref 8.4–25.7)
CALCIUM SERPL-MCNC: 8.5 MG/DL (ref 8.4–10.2)
CHLORIDE SERPL-SCNC: 111 MMOL/L (ref 98–107)
CO2 SERPL-SCNC: 21 MMOL/L (ref 22–29)
CREAT SERPL-MCNC: 0.9 MG/DL (ref 0.72–1.25)
CREAT/UREA NIT SERPL: 10
EOSINOPHIL # BLD AUTO: 0.08 X10(3)/MCL (ref 0–0.9)
EOSINOPHIL NFR BLD AUTO: 1.8 %
ERYTHROCYTE [DISTWIDTH] IN BLOOD BY AUTOMATED COUNT: 11.9 % (ref 11.5–17)
GFR SERPLBLD CREATININE-BSD FMLA CKD-EPI: >60 ML/MIN/1.73/M2
GLOBULIN SER-MCNC: 4.4 GM/DL (ref 2.4–3.5)
GLUCOSE SERPL-MCNC: 157 MG/DL (ref 74–100)
HCT VFR BLD AUTO: 42.5 % (ref 42–52)
HGB BLD-MCNC: 14.4 G/DL (ref 14–18)
IMM GRANULOCYTES # BLD AUTO: 0.01 X10(3)/MCL (ref 0–0.04)
IMM GRANULOCYTES NFR BLD AUTO: 0.2 %
LYMPHOCYTES # BLD AUTO: 1.96 X10(3)/MCL (ref 0.6–4.6)
LYMPHOCYTES NFR BLD AUTO: 44.2 %
MCH RBC QN AUTO: 35 PG (ref 27–31)
MCHC RBC AUTO-ENTMCNC: 33.9 G/DL (ref 33–36)
MCV RBC AUTO: 103.4 FL (ref 80–94)
MONOCYTES # BLD AUTO: 0.33 X10(3)/MCL (ref 0.1–1.3)
MONOCYTES NFR BLD AUTO: 7.4 %
NEUTROPHILS # BLD AUTO: 2.03 X10(3)/MCL (ref 2.1–9.2)
NEUTROPHILS NFR BLD AUTO: 45.9 %
NRBC BLD AUTO-RTO: 0 %
PLATELET # BLD AUTO: 196 X10(3)/MCL (ref 130–400)
PMV BLD AUTO: 10.7 FL (ref 7.4–10.4)
POTASSIUM SERPL-SCNC: 3.4 MMOL/L (ref 3.5–5.1)
PROT SERPL-MCNC: 7.8 GM/DL (ref 6.4–8.3)
RBC # BLD AUTO: 4.11 X10(6)/MCL (ref 4.7–6.1)
SODIUM SERPL-SCNC: 140 MMOL/L (ref 136–145)
T PALLIDUM AB SER QL: REACTIVE
WBC # BLD AUTO: 4.43 X10(3)/MCL (ref 4.5–11.5)

## 2025-08-20 PROCEDURE — 85025 COMPLETE CBC W/AUTO DIFF WBC: CPT

## 2025-08-20 PROCEDURE — 36415 COLL VENOUS BLD VENIPUNCTURE: CPT

## 2025-08-20 PROCEDURE — 86361 T CELL ABSOLUTE COUNT: CPT

## 2025-08-20 PROCEDURE — 86780 TREPONEMA PALLIDUM: CPT | Mod: 59

## 2025-08-20 PROCEDURE — 86780 TREPONEMA PALLIDUM: CPT

## 2025-08-20 PROCEDURE — 87536 HIV-1 QUANT&REVRSE TRNSCRPJ: CPT

## 2025-08-20 PROCEDURE — 80053 COMPREHEN METABOLIC PANEL: CPT

## 2025-08-20 PROCEDURE — 86592 SYPHILIS TEST NON-TREP QUAL: CPT

## 2025-08-21 LAB
AGE: 54
CD3+CD4+ CELLS # SPEC: 1263 UNIT/L (ref 589–1505)
CD3+CD4+ CELLS NFR BLD: 64.5 %
HIV1 RNA # SERPL NAA+PROBE: NOT DETECTED {COPIES}/ML
HIV1 RNA SERPL NAA+PROBE-LOG#: NOT DETECTED {LOG_COPIES}/ML
LYMPHOCYTES # BLD AUTO: 1958.06 X10(3)/MCL (ref 1260–5520)
LYMPHOCYTES NFR LN MANUAL: 44.2 % (ref 28–48)
LYMPHOMA - T-CELL MARKERS SPEC-IMP: ABNORMAL
RPR SER QL: NORMAL
WBC # BLD AUTO: 4430 /MM3 (ref 4500–11500)

## 2025-08-23 LAB — T PALLIDUM AB SER QL AGGL: POSITIVE
